# Patient Record
Sex: MALE | Race: BLACK OR AFRICAN AMERICAN | NOT HISPANIC OR LATINO | Employment: OTHER | URBAN - METROPOLITAN AREA
[De-identification: names, ages, dates, MRNs, and addresses within clinical notes are randomized per-mention and may not be internally consistent; named-entity substitution may affect disease eponyms.]

---

## 2017-02-13 ENCOUNTER — TRANSCRIBE ORDERS (OUTPATIENT)
Dept: ADMINISTRATIVE | Facility: HOSPITAL | Age: 72
End: 2017-02-13

## 2017-02-13 DIAGNOSIS — Z87.891 PERSONAL HISTORY OF TOBACCO USE, PRESENTING HAZARDS TO HEALTH: Primary | ICD-10-CM

## 2017-02-17 ENCOUNTER — HOSPITAL ENCOUNTER (OUTPATIENT)
Dept: CT IMAGING | Facility: HOSPITAL | Age: 72
Discharge: HOME | End: 2017-02-17
Admitting: NURSE PRACTITIONER

## 2017-02-17 DIAGNOSIS — Z87.891 PERSONAL HISTORY OF TOBACCO USE, PRESENTING HAZARDS TO HEALTH: ICD-10-CM

## 2017-02-17 PROCEDURE — G0297 LDCT FOR LUNG CA SCREEN: HCPCS

## 2017-04-11 ENCOUNTER — TRANSCRIBE ORDERS (OUTPATIENT)
Dept: ADMINISTRATIVE | Facility: HOSPITAL | Age: 72
End: 2017-04-11

## 2017-04-11 ENCOUNTER — HOSPITAL ENCOUNTER (OUTPATIENT)
Dept: GENERAL RADIOLOGY | Facility: HOSPITAL | Age: 72
Discharge: HOME | End: 2017-04-11
Attending: FAMILY MEDICINE | Admitting: FAMILY MEDICINE

## 2017-04-11 DIAGNOSIS — M54.9 BACK PAIN, UNSPECIFIED BACK LOCATION, UNSPECIFIED BACK PAIN LATERALITY, UNSPECIFIED CHRONICITY: Primary | ICD-10-CM

## 2017-04-11 PROCEDURE — 72114 X-RAY EXAM L-S SPINE BENDING: CPT

## 2017-09-13 ENCOUNTER — TRANSCRIBE ORDERS (OUTPATIENT)
Dept: ADMINISTRATIVE | Facility: HOSPITAL | Age: 72
End: 2017-09-13

## 2017-09-13 ENCOUNTER — HOSPITAL ENCOUNTER (OUTPATIENT)
Dept: GENERAL RADIOLOGY | Facility: HOSPITAL | Age: 72
Discharge: HOME | End: 2017-09-13
Attending: PAIN MEDICINE | Admitting: PAIN MEDICINE

## 2017-09-13 DIAGNOSIS — M25.551 PAIN IN RIGHT HIP: Primary | ICD-10-CM

## 2017-09-13 DIAGNOSIS — M25.552 PAIN IN LEFT HIP: ICD-10-CM

## 2017-09-13 PROCEDURE — 73521 X-RAY EXAM HIPS BI 2 VIEWS: CPT

## 2017-10-10 ENCOUNTER — OFFICE VISIT (OUTPATIENT)
Dept: ORTHOPEDIC SURGERY | Facility: CLINIC | Age: 72
End: 2017-10-10

## 2017-10-10 VITALS — HEIGHT: 69 IN | WEIGHT: 233 LBS | BODY MASS INDEX: 34.51 KG/M2

## 2017-10-10 DIAGNOSIS — Z96.652 H/O TOTAL KNEE REPLACEMENT, LEFT: Primary | ICD-10-CM

## 2017-10-10 PROCEDURE — 99213 OFFICE O/P EST LOW 20 MIN: CPT | Performed by: PHYSICIAN ASSISTANT

## 2017-10-10 RX ORDER — SPIRONOLACTONE 25 MG/1
TABLET ORAL
COMMUNITY
Start: 2017-08-07 | End: 2020-02-27

## 2017-10-10 RX ORDER — HYDROCODONE BITARTRATE AND ACETAMINOPHEN 5; 325 MG/1; MG/1
TABLET ORAL
Refills: 0 | COMMUNITY
Start: 2017-09-12 | End: 2020-02-27

## 2017-10-10 RX ORDER — DOXAZOSIN MESYLATE 4 MG/1
8 TABLET ORAL NIGHTLY
COMMUNITY
Start: 2017-08-21 | End: 2023-03-08 | Stop reason: HOSPADM

## 2017-10-10 RX ORDER — AMLODIPINE BESYLATE 5 MG/1
TABLET ORAL
Refills: 0 | COMMUNITY
Start: 2017-10-04 | End: 2023-02-24

## 2017-10-10 RX ORDER — FINASTERIDE 5 MG/1
5 TABLET, FILM COATED ORAL DAILY
COMMUNITY
Start: 2017-10-09 | End: 2023-03-08 | Stop reason: HOSPADM

## 2017-10-10 RX ORDER — CETIRIZINE HYDROCHLORIDE 10 MG/1
TABLET ORAL
COMMUNITY
Start: 2017-08-07 | End: 2020-02-27

## 2017-10-10 RX ORDER — BACLOFEN 20 MG/1
TABLET ORAL
Refills: 0 | COMMUNITY
Start: 2017-08-22 | End: 2020-02-27

## 2017-10-10 NOTE — PROGRESS NOTES
I have reviewed the notes, assessments, and/or procedures performed by Yaneth Chu PA-C, I concur with her documentation of Miguel Camejo.

## 2017-10-10 NOTE — PROGRESS NOTES
Patient: Miguel Camejo  : 1945    Primary Care Provider: Cuong Hairston MD    Requesting Provider: As above    Follow-up (1 year Left TKA 16)      History    Chief Complaint: Patient returns today for routine annual follow-up left total knee arthroplasty May 2016 with Dr. Harrison.    History of Present Illness: This is a very pleasant 72-year-old male presenting today for routine annual follow-up left total knee arthroplasty.  He reports no pain or problems in the knee itself.  He complains of numbness and tingling and pins and needles in the left lower extremity below the knee.  He does have a history of back problems.  He cannot tell me how long this is been going on.        Allergies   Allergen Reactions   • Benazepril Swelling   • Lisinopril      Current Outpatient Prescriptions on File Prior to Visit   Medication Sig Dispense Refill   • albuterol (PROAIR HFA) 108 (90 Base) MCG/ACT inhaler Inhale Take As Directed.     • aliskiren (TEKTURNA) 300 MG tablet Take  by mouth Take As Directed.     • allopurinol (ZYLOPRIM) 300 MG tablet Take  by mouth Take As Directed.     • Ascorbic Acid (VITAMIN C PO) Take  by mouth Take As Directed.     • aspirin  MG tablet Take  by mouth Take As Directed.     • atorvastatin (LIPITOR) 40 MG tablet Take  by mouth Take As Directed.     • Cyanocobalamin (VITAMIN B-12 PO) Take  by mouth Take As Directed.     • diclofenac (VOLTAREN) 1 % gel gel Place  on the skin Take As Directed.     • folic acid (FOLVITE) 1 MG tablet Take  by mouth Take As Directed.     • hydrochlorothiazide (HYDRODIURIL) 25 MG tablet Take  by mouth Take As Directed.     • losartan (COZAAR) 100 MG tablet Take  by mouth Take As Directed.     • metFORMIN (GLUCOPHAGE) 850 MG tablet Take  by mouth Take As Directed.     • metoprolol succinate XL (TOPROL-XL) 100 MG 24 hr tablet Take  by mouth Take As Directed.     • minoxidil (LONITEN) 10 MG tablet Take  by mouth Take As Directed.     • Omega-3  Fatty Acids (FISH OIL CONCENTRATE PO) Take  by mouth Take As Directed.     • omeprazole (priLOSEC) 40 MG capsule Take  by mouth Take As Directed.     • terbinafine (lamiSIL) 250 MG tablet Take  by mouth Take As Directed.     • tiotropium (SPIRIVA HANDIHALER) 18 MCG per inhalation capsule Place  into inhaler and inhale Take As Directed.     • [DISCONTINUED] dicyclomine (BENTYL) 20 MG tablet Take  by mouth Take As Directed.     • [DISCONTINUED] HYDROcodone-acetaminophen (NORCO)  MG per tablet Take  by mouth Take As Directed.       No current facility-administered medications on file prior to visit.      Social History     Social History   • Marital status:      Spouse name: N/A   • Number of children: N/A   • Years of education: N/A     Occupational History   • Not on file.     Social History Main Topics   • Smoking status: Former Smoker     Packs/day: 2.00     Years: 36.00     Types: Cigarettes   • Smokeless tobacco: Never Used   • Alcohol use Yes      Comment: occasional   • Drug use: No   • Sexual activity: Defer     Other Topics Concern   • Not on file     Social History Narrative     Past Surgical History:   Procedure Laterality Date   • COLONOSCOPY     • KNEE ARTHROSCOPY Left    • PARTIAL KNEE ARTHROPLASTY Left    • VASECTOMY       Family History   Problem Relation Age of Onset   • Hypertension Mother    • Cancer Mother    • Rheum arthritis Mother    • Osteoarthritis Mother    • Hypertension Father    • Heart attack Father    • Heart disease Father    • Hypertension Other    • Heart attack Other    • Heart disease Other    • Cancer Other    • Stroke Other      Past Medical History:   Diagnosis Date   • Chondrocalcinosis of right knee    • COPD (chronic obstructive pulmonary disease)    • Diabetes mellitus    • Esophagitis    • Gout    • Hypertension    • IBS (irritable bowel syndrome)    • JONAH on CPAP    • Primary osteoarthritis of both knees    • Rheumatoid arthritis    • Skin problem    • SOB  "(shortness of breath)          Review of Systems   Constitutional: Positive for chills and fatigue.   HENT: Negative.    Eyes: Positive for discharge.   Respiratory: Positive for apnea and shortness of breath.    Cardiovascular: Positive for leg swelling.   Gastrointestinal: Positive for blood in stool, constipation and diarrhea.   Endocrine: Positive for cold intolerance.   Genitourinary: Negative.    Musculoskeletal: Positive for back pain.   Skin: Positive for color change and rash.   Allergic/Immunologic: Negative.    Neurological: Positive for weakness and numbness.   Hematological: Negative.    Psychiatric/Behavioral: Negative.        The following portions of the patient's history were reviewed and updated as appropriate: allergies, current medications, past family history, past medical history, past social history, past surgical history and problem list.    Objective   Ht 69\" (175.3 cm)  Wt 233 lb (106 kg)  BMI 34.41 kg/m2    Physical Exam:   GENERAL: Body habitus: obese    Lower extremity edema: Left: 1+ pitting; Right: 1+ pitting    Varicose veins:  Left: none; Right: none    Gait: normal     Mental Status:  awake and alert; oriented to person, place, and time    Voice:  clear  SKIN:  Normal    Hair Growth:  Right:normal; Left:  normal  HEENT: Head: Normocephalic, no lesions, without obvious abnormality.     Eyes: sclera anicteric  PULM:  Repiratory effort normal    Ortho Exam  Left Hip Exam  ---------  FLEXION CONTRACTURE: None  FLEXION: 110 degrees  INTERNAL ROTATION: 20 degrees at 90 degrees of flexion   EXTERNAL ROTATION: 40 degrees at 90 degrees of flexion    PAIN WITH HIP MOTION: no      Left Knee Exam  ----------  Knee Exam:  ----------  ALIGNMENT:  Left: neutral  ----------  RANGE OF MOTION:  Left: 0-120  LIGAMENTOUS STABILITY:   Left:stable to varus and valgus stress at terminal extension and 30 degrees without any evidence of laxity   ----------  STRENGTH:  KNEE FLEXION  Left 5/5  KNEE " EXTENSION Left 5/5  ----------  PAIN WITH PALPATION: Left denies tenderness to palpation about the knee  PAIN WITH KNEE ROM:  Left no  PATELLAR CREPITUS:  Left no  ----------  SENSATION TO LIGHT TOUCH:  DEEP PERONEAL/SUPERFICIAL PERONEAL/SURAL/SAPHENOUS/TIBIAL:   Left intact  ----------  EDEMA:   Left:  no  ERYTHEMA:  ; Left:  no  WOUNDS/INCISIONS: none, no overlying skin problems.      Medical Decision Making    Data Review:   ordered and reviewed x-rays today    Assessment and Plan/ Diagnosis/Treatment options:   Doing well status post left total knee arthroplasty May 2016.  I reviewed x-rays and clinical findings with the patient.  On exam, he has good range of motion with no tenderness and stable ligamentous exam.  X-rays today show well-positioned, cemented total knee arthroplasty with no evidence of osteolysis, subsidence or fracture.  Patient reports no pain in the knee he is able to walk and do activities he would like to without any problem.  Plan is continued observation.  He'll return in 2 years with repeat x-rays of the left knee or sooner if needed.

## 2017-11-17 ENCOUNTER — TRANSCRIBE ORDERS (OUTPATIENT)
Dept: ADMINISTRATIVE | Facility: HOSPITAL | Age: 72
End: 2017-11-17

## 2017-11-17 DIAGNOSIS — M47.816 LUMBAR SPONDYLOSIS: Primary | ICD-10-CM

## 2017-11-21 ENCOUNTER — HOSPITAL ENCOUNTER (OUTPATIENT)
Dept: MRI IMAGING | Facility: HOSPITAL | Age: 72
Discharge: HOME | End: 2017-11-21
Attending: PAIN MEDICINE | Admitting: PAIN MEDICINE

## 2017-11-21 DIAGNOSIS — M47.816 LUMBAR SPONDYLOSIS: ICD-10-CM

## 2017-11-21 PROCEDURE — 72148 MRI LUMBAR SPINE W/O DYE: CPT

## 2017-11-22 ENCOUNTER — TRANSCRIBE ORDERS (OUTPATIENT)
Dept: ADMINISTRATIVE | Facility: HOSPITAL | Age: 72
End: 2017-11-22

## 2018-05-23 ENCOUNTER — TRANSCRIBE ORDERS (OUTPATIENT)
Dept: ADMINISTRATIVE | Facility: HOSPITAL | Age: 73
End: 2018-05-23

## 2018-05-23 DIAGNOSIS — N64.4 BREAST PAIN: Primary | ICD-10-CM

## 2018-06-01 ENCOUNTER — HOSPITAL ENCOUNTER (OUTPATIENT)
Dept: ULTRASOUND IMAGING | Facility: HOSPITAL | Age: 73
Discharge: HOME | End: 2018-06-01

## 2018-06-01 ENCOUNTER — HOSPITAL ENCOUNTER (OUTPATIENT)
Dept: MAMMOGRAPHY | Facility: HOSPITAL | Age: 73
Discharge: HOME | End: 2018-06-01
Attending: FAMILY MEDICINE | Admitting: FAMILY MEDICINE

## 2018-06-01 DIAGNOSIS — N64.4 BREAST PAIN: ICD-10-CM

## 2018-06-01 PROCEDURE — G0279 TOMOSYNTHESIS, MAMMO: HCPCS

## 2018-06-01 PROCEDURE — 77066 DX MAMMO INCL CAD BI: CPT | Performed by: RADIOLOGY

## 2018-06-01 PROCEDURE — 76642 ULTRASOUND BREAST LIMITED: CPT

## 2018-06-01 PROCEDURE — G0279 TOMOSYNTHESIS, MAMMO: HCPCS | Performed by: RADIOLOGY

## 2018-06-01 PROCEDURE — 76642 ULTRASOUND BREAST LIMITED: CPT | Performed by: RADIOLOGY

## 2018-06-01 PROCEDURE — 77066 DX MAMMO INCL CAD BI: CPT

## 2018-06-29 ENCOUNTER — TELEPHONE (OUTPATIENT)
Dept: GENETICS | Facility: HOSPITAL | Age: 73
End: 2018-06-29

## 2019-02-01 ENCOUNTER — HOSPITAL ENCOUNTER (OUTPATIENT)
Dept: GENERAL RADIOLOGY | Facility: HOSPITAL | Age: 74
Discharge: HOME | End: 2019-02-01
Attending: FAMILY MEDICINE | Admitting: FAMILY MEDICINE

## 2019-02-01 ENCOUNTER — TRANSCRIBE ORDERS (OUTPATIENT)
Dept: ADMINISTRATIVE | Facility: HOSPITAL | Age: 74
End: 2019-02-01

## 2019-02-01 DIAGNOSIS — M25.561 RIGHT KNEE PAIN, UNSPECIFIED CHRONICITY: Primary | ICD-10-CM

## 2019-02-01 PROCEDURE — 73564 X-RAY EXAM KNEE 4 OR MORE: CPT

## 2019-03-15 ENCOUNTER — TRANSCRIBE ORDERS (OUTPATIENT)
Dept: ADMINISTRATIVE | Facility: HOSPITAL | Age: 74
End: 2019-03-15

## 2019-03-15 DIAGNOSIS — Z87.891 PERSONAL HISTORY OF TOBACCO USE, PRESENTING HAZARDS TO HEALTH: Primary | ICD-10-CM

## 2019-04-04 ENCOUNTER — HOSPITAL ENCOUNTER (OUTPATIENT)
Dept: ULTRASOUND IMAGING | Facility: HOSPITAL | Age: 74
Discharge: HOME | End: 2019-04-04
Admitting: NURSE PRACTITIONER

## 2019-04-04 ENCOUNTER — APPOINTMENT (OUTPATIENT)
Dept: CT IMAGING | Facility: HOSPITAL | Age: 74
End: 2019-04-04

## 2019-04-04 DIAGNOSIS — Z87.891 PERSONAL HISTORY OF TOBACCO USE, PRESENTING HAZARDS TO HEALTH: ICD-10-CM

## 2019-04-04 PROCEDURE — 76706 US ABDL AORTA SCREEN AAA: CPT

## 2019-10-29 ENCOUNTER — OFFICE VISIT (OUTPATIENT)
Dept: ORTHOPEDIC SURGERY | Facility: CLINIC | Age: 74
End: 2019-10-29

## 2019-10-29 VITALS — OXYGEN SATURATION: 97 % | HEIGHT: 70 IN | HEART RATE: 78 BPM | BODY MASS INDEX: 34.59 KG/M2 | WEIGHT: 241.62 LBS

## 2019-10-29 DIAGNOSIS — Z96.652 HISTORY OF TOTAL LEFT KNEE REPLACEMENT: Primary | ICD-10-CM

## 2019-10-29 PROCEDURE — 99212 OFFICE O/P EST SF 10 MIN: CPT | Performed by: PHYSICIAN ASSISTANT

## 2019-10-29 RX ORDER — ASPIRIN 81 MG/1
81 TABLET ORAL DAILY
COMMUNITY

## 2019-10-29 RX ORDER — ASCORBIC ACID 500 MG
500 TABLET ORAL DAILY
COMMUNITY
End: 2020-02-27

## 2019-10-29 NOTE — PROGRESS NOTES
[facility] [place] [facility] Note    Subjective     Patient: Miguel Camejo  : 1945    Primary Care Provider: Cuong Hairston MD    Requesting Provider: As above    Follow-up of the Left Knee (2 year f/u/H/O total knee replacement, left 16)      History    Chief Complaint: Follow-up left total knee    History of Present Illness: Patient returns today for routine annual follow-up left total knee arthroplasty with Dr. Keen in 2016.  He reports some tightness in the knee but no pain.  He is able to walk and do his normal activity without any difficulty.  He is been very happy with his outcome.    Current Outpatient Medications on File Prior to Visit   Medication Sig Dispense Refill   • albuterol (PROAIR HFA) 108 (90 Base) MCG/ACT inhaler Inhale Take As Directed.     • allopurinol (ZYLOPRIM) 300 MG tablet Take  by mouth Take As Directed.     • amLODIPine (NORVASC) 5 MG tablet   0   • aspirin 81 MG EC tablet Take 81 mg by mouth Daily.     • atorvastatin (LIPITOR) 40 MG tablet Take  by mouth Take As Directed.     • cetirizine (zyrTEC) 10 MG tablet      • doxazosin (CARDURA) 4 MG tablet      • finasteride (PROSCAR) 5 MG tablet      • folic acid (FOLVITE) 1 MG tablet Take  by mouth Take As Directed.     • hydrochlorothiazide (HYDRODIURIL) 25 MG tablet Take  by mouth Take As Directed.     • HYDROcodone-acetaminophen (NORCO) 5-325 MG per tablet take 1/2 to 1 tablet by mouth twice a day if needed  0   • losartan (COZAAR) 100 MG tablet Take  by mouth Take As Directed.     • metoprolol succinate XL (TOPROL-XL) 100 MG 24 hr tablet Take  by mouth Take As Directed.     • minoxidil (LONITEN) 10 MG tablet Take  by mouth Take As Directed.     • Multiple Vitamin (MULTI VITAMIN DAILY PO) Take  by mouth.     • omeprazole (priLOSEC) 40 MG capsule Take  by mouth Take As Directed.     • tiotropium (SPIRIVA HANDIHALER) 18 MCG per inhalation capsule Place  into inhaler and inhale Take As Directed.     •  vitamin C (ASCORBIC ACID) 500 MG tablet Take 500 mg by mouth Daily.     • aliskiren (TEKTURNA) 300 MG tablet Take  by mouth Take As Directed.     • Ascorbic Acid (VITAMIN C PO) Take  by mouth Take As Directed.     • aspirin  MG tablet Take  by mouth Take As Directed.     • baclofen (LIORESAL) 20 MG tablet take 1 tablet by mouth at bedtime  0   • BREO ELLIPTA 100-25 MCG/INH inhaler   0   • Cyanocobalamin (VITAMIN B-12 PO) Take  by mouth Take As Directed.     • diclofenac (VOLTAREN) 1 % gel gel Place  on the skin Take As Directed.     • metFORMIN (GLUCOPHAGE) 850 MG tablet Take  by mouth Take As Directed.     • Omega-3 Fatty Acids (FISH OIL CONCENTRATE PO) Take  by mouth Take As Directed.     • spironolactone (ALDACTONE) 25 MG tablet      • terbinafine (lamiSIL) 250 MG tablet Take  by mouth Take As Directed.       No current facility-administered medications on file prior to visit.       Allergies   Allergen Reactions   • Benazepril Swelling   • Lisinopril       Past Medical History:   Diagnosis Date   • Chondrocalcinosis of right knee    • COPD (chronic obstructive pulmonary disease) (CMS/HCC)    • Diabetes mellitus (CMS/HCC)    • Esophagitis    • Gout    • Hypertension    • IBS (irritable bowel syndrome)    • JONAH on CPAP    • Primary osteoarthritis of both knees    • Rheumatoid arthritis (CMS/HCC)    • Skin problem    • SOB (shortness of breath)      Past Surgical History:   Procedure Laterality Date   • COLONOSCOPY     • KNEE ARTHROSCOPY Left    • PARTIAL KNEE ARTHROPLASTY Left    • VASECTOMY       Family History   Problem Relation Age of Onset   • Hypertension Mother    • Cancer Mother    • Rheum arthritis Mother    • Osteoarthritis Mother    • Hypertension Father    • Heart attack Father    • Heart disease Father    • Hypertension Other    • Heart attack Other    • Heart disease Other    • Cancer Other    • Stroke Other    • BRCA 1/2 Neg Hx    • Breast cancer Neg Hx    • Ovarian cancer Neg Hx       Social  "History     Socioeconomic History   • Marital status:      Spouse name: Not on file   • Number of children: Not on file   • Years of education: Not on file   • Highest education level: Not on file   Tobacco Use   • Smoking status: Former Smoker     Packs/day: 2.00     Years: 36.00     Pack years: 72.00     Types: Cigarettes   • Smokeless tobacco: Never Used   Substance and Sexual Activity   • Alcohol use: Yes     Comment: occasional   • Drug use: No   • Sexual activity: Defer        Review of Systems   Constitutional: Negative.    HENT: Negative.    Eyes: Negative.    Respiratory: Negative.    Cardiovascular: Negative.    Gastrointestinal: Negative.    Endocrine: Negative.    Genitourinary: Negative.    Musculoskeletal:        No pain (L) TKA TIGHTNESS   Skin: Negative.    Allergic/Immunologic: Negative.    Neurological: Negative.    Hematological: Negative.    Psychiatric/Behavioral: Negative.        The following portions of the patient's history were reviewed and updated as appropriate: allergies, current medications, past family history, past medical history, past social history, past surgical history and problem list.      Objective      Physical Exam  Pulse 78   Ht 177.8 cm (70\")   Wt 110 kg (241 lb 10 oz)   SpO2 97%   BMI 34.67 kg/m²     Body mass index is 34.67 kg/m².    Patient is well developed, well nourished and in no acute distress.  Alert and oriented x 3.    Ortho Exam    Left Knee Exam  ----------  Knee Exam:  ----------  ALIGNMENT:  Left: neutral  ----------  RANGE OF MOTION:  Left: Normal (0-120 degrees) with no extensor lag or flexion contracture  LIGAMENTOUS STABILITY:   Left:stable to varus and valgus stress at terminal extension and 30 degrees without any evidence of laxity   ----------  STRENGTH:  KNEE FLEXION  Left 5/5  KNEE EXTENSION Left 5/5  ----------  PAIN WITH PALPATION: Left denies tenderness to palpation about the knee  PAIN WITH KNEE ROM:  Left no  PATELLAR CREPITUS:  Left " no  ----------  SENSATION TO LIGHT TOUCH:  DEEP PERONEAL/SUPERFICIAL PERONEAL/SURAL/SAPHENOUS/TIBIAL:   Left intact  ----------  EFFUSION:   Left:  no  ERYTHEMA:  ; Left:  no  WOUNDS/INCISIONS: Well-healed anterior incision, no overlying skin problems.      Medical Decision Making    Data Review:   ordered and reviewed x-rays today    Assessment:  1. History of total left knee replacement        Plan:  Status post left total knee arthroplasty with Dr. Keen in 2016.  X-rays today show well position, cemented total knee arthroplasty with no evidence of osteolysis, subsidence of fracture.  Patient has been very happy with his outcome.  Plan is he return in 2 years with repeat x-rays or sooner if needed.      Yaneth Chu PA-C  10/29/19  1:12 PM

## 2020-02-26 PROBLEM — D61.818 PANCYTOPENIA (HCC): Status: ACTIVE | Noted: 2020-02-26

## 2020-02-27 ENCOUNTER — LAB (OUTPATIENT)
Dept: LAB | Facility: HOSPITAL | Age: 75
End: 2020-02-27

## 2020-02-27 ENCOUNTER — CONSULT (OUTPATIENT)
Dept: ONCOLOGY | Facility: CLINIC | Age: 75
End: 2020-02-27

## 2020-02-27 VITALS
SYSTOLIC BLOOD PRESSURE: 157 MMHG | RESPIRATION RATE: 18 BRPM | HEART RATE: 74 BPM | TEMPERATURE: 99.2 F | BODY MASS INDEX: 34.93 KG/M2 | DIASTOLIC BLOOD PRESSURE: 69 MMHG | WEIGHT: 244 LBS | HEIGHT: 70 IN | OXYGEN SATURATION: 94 %

## 2020-02-27 DIAGNOSIS — D61.818 PANCYTOPENIA (HCC): ICD-10-CM

## 2020-02-27 DIAGNOSIS — D61.818 PANCYTOPENIA (HCC): Primary | ICD-10-CM

## 2020-02-27 DIAGNOSIS — Z13.6 ENCOUNTER FOR SCREENING FOR CARDIOVASCULAR DISORDERS: ICD-10-CM

## 2020-02-27 LAB
ALBUMIN SERPL-MCNC: 4.2 G/DL (ref 3.5–5.2)
ALBUMIN/GLOB SERPL: 1.2 G/DL
ALP SERPL-CCNC: 65 U/L (ref 39–117)
ALT SERPL W P-5'-P-CCNC: 14 U/L (ref 1–41)
ANION GAP SERPL CALCULATED.3IONS-SCNC: 10 MMOL/L (ref 5–15)
AST SERPL-CCNC: 16 U/L (ref 1–40)
BILIRUB CONJ SERPL-MCNC: <0.2 MG/DL (ref 0.2–0.3)
BILIRUB INDIRECT SERPL-MCNC: ABNORMAL MG/DL
BILIRUB SERPL-MCNC: 0.5 MG/DL (ref 0.2–1.2)
BILIRUB SERPL-MCNC: 0.5 MG/DL (ref 0.2–1.2)
BUN BLD-MCNC: 25 MG/DL (ref 8–23)
BUN/CREAT SERPL: 16.7 (ref 7–25)
CALCIUM SPEC-SCNC: 9.1 MG/DL (ref 8.6–10.5)
CHLORIDE SERPL-SCNC: 104 MMOL/L (ref 98–107)
CHROMATIN AB SERPL-ACNC: <10 IU/ML (ref 0–14)
CO2 SERPL-SCNC: 29 MMOL/L (ref 22–29)
CREAT BLD-MCNC: 1.5 MG/DL (ref 0.76–1.27)
DAT POLY-SP REAG RBC QL: NEGATIVE
ERYTHROCYTE [DISTWIDTH] IN BLOOD BY AUTOMATED COUNT: 13.1 % (ref 12.3–15.4)
FOLATE SERPL-MCNC: >20 NG/ML (ref 4.78–24.2)
GFR SERPL CREATININE-BSD FRML MDRD: 55 ML/MIN/1.73
GLOBULIN UR ELPH-MCNC: 3.5 GM/DL
GLUCOSE BLD-MCNC: 105 MG/DL (ref 65–99)
HAPTOGLOB SERPL-MCNC: 213 MG/DL (ref 30–200)
HCT VFR BLD AUTO: 34 % (ref 37.5–51)
HCYS SERPL-MCNC: 10.5 UMOL/L (ref 0–15)
HGB BLD-MCNC: 11.4 G/DL (ref 13–17.7)
LDH SERPL-CCNC: 242 U/L (ref 135–225)
LYMPHOCYTES # BLD AUTO: 1.2 10*3/MM3 (ref 0.7–3.1)
LYMPHOCYTES NFR BLD AUTO: 33.6 % (ref 19.6–45.3)
MCH RBC QN AUTO: 30.2 PG (ref 26.6–33)
MCHC RBC AUTO-ENTMCNC: 33.4 G/DL (ref 31.5–35.7)
MCV RBC AUTO: 90.2 FL (ref 79–97)
MONOCYTES # BLD AUTO: 0.4 10*3/MM3 (ref 0.1–0.9)
MONOCYTES NFR BLD AUTO: 10 % (ref 5–12)
NEUTROPHILS # BLD AUTO: 2 10*3/MM3 (ref 1.7–7)
NEUTROPHILS NFR BLD AUTO: 56.4 % (ref 42.7–76)
PLATELET # BLD AUTO: 140 10*3/MM3 (ref 140–450)
PMV BLD AUTO: 7.3 FL (ref 6–12)
POTASSIUM BLD-SCNC: 4.1 MMOL/L (ref 3.5–5.2)
PROT SERPL-MCNC: 7.7 G/DL (ref 6–8.5)
RBC # BLD AUTO: 3.77 10*6/MM3 (ref 4.14–5.8)
RETICS # AUTO: 0.08 10*6/MM3 (ref 0.02–0.13)
RETICS/RBC NFR AUTO: 2.08 % (ref 0.7–1.9)
SODIUM BLD-SCNC: 143 MMOL/L (ref 136–145)
VIT B12 BLD-MCNC: >2000 PG/ML (ref 211–946)
WBC NRBC COR # BLD: 3.6 10*3/MM3 (ref 3.4–10.8)

## 2020-02-27 PROCEDURE — 82784 ASSAY IGA/IGD/IGG/IGM EACH: CPT

## 2020-02-27 PROCEDURE — 36415 COLL VENOUS BLD VENIPUNCTURE: CPT

## 2020-02-27 PROCEDURE — 85045 AUTOMATED RETICULOCYTE COUNT: CPT

## 2020-02-27 PROCEDURE — 83010 ASSAY OF HAPTOGLOBIN QUANT: CPT

## 2020-02-27 PROCEDURE — 85060 BLOOD SMEAR INTERPRETATION: CPT

## 2020-02-27 PROCEDURE — 83921 ORGANIC ACID SINGLE QUANT: CPT

## 2020-02-27 PROCEDURE — 82746 ASSAY OF FOLIC ACID SERUM: CPT

## 2020-02-27 PROCEDURE — 83883 ASSAY NEPHELOMETRY NOT SPEC: CPT

## 2020-02-27 PROCEDURE — 82248 BILIRUBIN DIRECT: CPT

## 2020-02-27 PROCEDURE — 86880 COOMBS TEST DIRECT: CPT

## 2020-02-27 PROCEDURE — 84165 PROTEIN E-PHORESIS SERUM: CPT

## 2020-02-27 PROCEDURE — 86334 IMMUNOFIX E-PHORESIS SERUM: CPT

## 2020-02-27 PROCEDURE — 82955 ASSAY OF G6PD ENZYME: CPT

## 2020-02-27 PROCEDURE — 83090 ASSAY OF HOMOCYSTEINE: CPT

## 2020-02-27 PROCEDURE — 83051 HEMOGLOBIN PLASMA: CPT

## 2020-02-27 PROCEDURE — 83615 LACTATE (LD) (LDH) ENZYME: CPT

## 2020-02-27 PROCEDURE — 82668 ASSAY OF ERYTHROPOIETIN: CPT

## 2020-02-27 PROCEDURE — 82247 BILIRUBIN TOTAL: CPT

## 2020-02-27 PROCEDURE — 85025 COMPLETE CBC W/AUTO DIFF WBC: CPT

## 2020-02-27 PROCEDURE — 86038 ANTINUCLEAR ANTIBODIES: CPT

## 2020-02-27 PROCEDURE — 80053 COMPREHEN METABOLIC PANEL: CPT

## 2020-02-27 PROCEDURE — 86431 RHEUMATOID FACTOR QUANT: CPT

## 2020-02-27 PROCEDURE — 99205 OFFICE O/P NEW HI 60 MIN: CPT | Performed by: INTERNAL MEDICINE

## 2020-02-27 PROCEDURE — 82607 VITAMIN B-12: CPT

## 2020-02-27 PROCEDURE — 85041 AUTOMATED RBC COUNT: CPT

## 2020-02-27 PROCEDURE — 83070 ASSAY OF HEMOSIDERIN QUAL: CPT

## 2020-02-27 RX ORDER — MULTIVIT WITH MINERALS/LUTEIN
TABLET ORAL
Status: ON HOLD | COMMUNITY
Start: 2020-02-05 | End: 2023-03-06

## 2020-02-27 RX ORDER — BLOOD SUGAR DIAGNOSTIC
STRIP MISCELLANEOUS
Status: ON HOLD | COMMUNITY
Start: 2020-02-05 | End: 2023-03-06

## 2020-02-27 RX ORDER — LANCETS
EACH MISCELLANEOUS
Status: ON HOLD | COMMUNITY
Start: 2020-02-05 | End: 2023-03-06

## 2020-02-27 RX ORDER — LANOLIN ALCOHOL/MO/W.PET/CERES
CREAM (GRAM) TOPICAL
COMMUNITY
Start: 2020-02-04 | End: 2023-02-24

## 2020-02-27 RX ORDER — CIPROFLOXACIN 500 MG/1
500 TABLET, FILM COATED ORAL 2 TIMES DAILY
COMMUNITY
Start: 2020-02-22 | End: 2020-02-27

## 2020-02-27 RX ORDER — CIPROFLOXACIN 500 MG/1
500 TABLET, FILM COATED ORAL 2 TIMES DAILY
COMMUNITY
End: 2020-08-17

## 2020-02-27 RX ORDER — SENNOSIDES 8.6 MG
650 CAPSULE ORAL EVERY 8 HOURS PRN
COMMUNITY
End: 2023-02-24

## 2020-02-27 NOTE — PROGRESS NOTES
CHIEF COMPLAINT: Pancytopenia    REASON FOR REFERRAL: Same      RECORDS OBTAINED  Records of the patients history including those obtained from Dr. Hairston were reviewed and summarized in detail.    Oncology/Hematology History    1.  Pancytopenia  2.  Chronic low back pain   3.  Chronic kidney disease  4.  Type 2 diabetes  5.  Hyperlipidemia  6.  Hypertension  7.  Hypogonadism  8.  Depression  9. COPD  10.  Obstructive sleep apnea  11.  Reflux   12.  Gout  13 osteoarthritis  14.  BPH with ED  15.  BMI 36    Hematology history:  -Longstanding history of heme positive stools dating back to the late 80s with at least 5 colonoscopies by Dr. Perez including the last 1/2018 as well as EGDs and a capsule endoscopy that apparently showed scattered blood that they could not do much about.In the [facility] system we have hemoglobins of 9.8 and platelet of 121,000 with white count 4880 as of June 2016 and December 2015 white count 3020 with platelets 126,000 hemoglobin 11.4.  Does have a history of heavy alcohol use but none in the past several years.  -8/7/2019 Hemoccult negative  -1/30/2020 reticulocyte count 1.4%.  B12 618.  Iron slightly low 48.  White count 2700 with hemoglobin 11.6 MCV 88 and normal red cell distribution width with platelets 124,000.  Normal thyroid functions.  -2/4/2020 white count 3100 hemoglobin 11.7 with normal MCV and red cell distribution with platelets 126,000 normal liver enzymes and bilirubin including fractionation.  Creatinine 1.36 GFR greater than 60 with normal calcium 9.1.  .  C-reactive protein 9.67 with sedimentation rate elevated at 40 as well.  Was started on iron with vitamin C.    -2/27/2020 initial [facility] hematology consultation: He had dark tarry stools predating the institution of his current iron and extensive prior endoscopic work-up as outlined above.  I will check his methylmalonic acid and homocystine along with repeat B12 and folate and with his elevated C-reactive protein and  sedimentation rate will check a serum immunoelectrophoresis and light chains.  Given his prior drinking history despite the fact that his liver enzymes have been normal I strongly suspect portal hypertension and I suspect the GI bleeding may be related to this but we will get records from .  If we do not get answers from this then we will proceed with bone marrow biopsy.  If there is a monoclonal gammopathy we will also proceed with bone marrow biopsy.        Pancytopenia (CMS/HCC)    2/26/2020 Initial Diagnosis     Pancytopenia (CMS/HCC)         HISTORY OF PRESENT ILLNESS:  The patient is a 75 y.o.  male, referred for pancytopenia.  See above for hematology history.    REVIEW OF SYSTEMS:  A 14 point review of systems was performed and is negative except as noted above.    Past Medical History:   Diagnosis Date   • Arthritis    • Asthma    • Bowel trouble    • Chondrocalcinosis of right knee    • Colitis    • COPD (chronic obstructive pulmonary disease) (CMS/HCC)    • Diabetes mellitus (CMS/HCC)    • Esophagitis    • Gout    • H/O bladder problems    • Heart murmur    • Hypertension    • IBS (irritable bowel syndrome)    • JONAH on CPAP    • Primary osteoarthritis of both knees    • Rheumatoid arthritis (CMS/HCC)    • Skin problem    • SOB (shortness of breath)      Past Surgical History:   Procedure Laterality Date   • COLONOSCOPY     • KNEE ARTHROSCOPY Left    • PARTIAL KNEE ARTHROPLASTY Left    • SKIN BIOPSY     • STOMACH SURGERY     • VASECTOMY         Current Outpatient Medications on File Prior to Visit   Medication Sig Dispense Refill   • acetaminophen (TYLENOL) 650 MG 8 hr tablet Take 650 mg by mouth Every 8 (Eight) Hours As Needed for Mild Pain .     • albuterol (PROAIR HFA) 108 (90 Base) MCG/ACT inhaler Inhale Take As Directed.     • allopurinol (ZYLOPRIM) 300 MG tablet Take  by mouth Take As Directed.     • amLODIPine (NORVASC) 5 MG tablet   0   • aspirin 81 MG EC tablet Take 81 mg by mouth Daily.      • atorvastatin (LIPITOR) 40 MG tablet Take  by mouth Take As Directed.     • ciprofloxacin (CIPRO) 500 MG tablet Take 500 mg by mouth 2 (Two) Times a Day.     • doxazosin (CARDURA) 4 MG tablet      • ferrous sulfate 325 (65 FE) MG EC tablet      • finasteride (PROSCAR) 5 MG tablet      • folic acid (FOLVITE) 1 MG tablet Take 400 mg by mouth Take As Directed.     • hydrochlorothiazide (HYDRODIURIL) 25 MG tablet Take  by mouth Take As Directed.     • losartan (COZAAR) 100 MG tablet Take  by mouth Take As Directed.     • metoprolol succinate XL (TOPROL-XL) 100 MG 24 hr tablet Take  by mouth Take As Directed.     • minoxidil (LONITEN) 10 MG tablet Take  by mouth Take As Directed.     • omeprazole (priLOSEC) 40 MG capsule Take  by mouth Take As Directed.     • tiotropium (SPIRIVA HANDIHALER) 18 MCG per inhalation capsule Place  into inhaler and inhale Take As Directed.     • vitamin C (ASCORBIC ACID) 250 MG tablet      • ACCU-CHEK SARAHI PLUS test strip      • ACCU-CHEK SOFTCLIX LANCETS lancets      • [DISCONTINUED] aliskiren (TEKTURNA) 300 MG tablet Take  by mouth Take As Directed.     • [DISCONTINUED] Ascorbic Acid (VITAMIN C PO) Take  by mouth Take As Directed.     • [DISCONTINUED] aspirin  MG tablet Take  by mouth Take As Directed.     • [DISCONTINUED] baclofen (LIORESAL) 20 MG tablet take 1 tablet by mouth at bedtime  0   • [DISCONTINUED] BREO ELLIPTA 100-25 MCG/INH inhaler   0   • [DISCONTINUED] cetirizine (zyrTEC) 10 MG tablet      • [DISCONTINUED] ciprofloxacin (CIPRO) 500 MG tablet Take 500 mg by mouth 2 (Two) Times a Day.     • [DISCONTINUED] Cyanocobalamin (VITAMIN B-12 PO) Take  by mouth Take As Directed.     • [DISCONTINUED] diclofenac (VOLTAREN) 1 % gel gel Place  on the skin Take As Directed.     • [DISCONTINUED] HYDROcodone-acetaminophen (NORCO) 5-325 MG per tablet take 1/2 to 1 tablet by mouth twice a day if needed  0   • [DISCONTINUED] metFORMIN (GLUCOPHAGE) 850 MG tablet Take  by mouth Take As  "Directed.     • [DISCONTINUED] Multiple Vitamin (MULTI VITAMIN DAILY PO) Take  by mouth.     • [DISCONTINUED] Omega-3 Fatty Acids (FISH OIL CONCENTRATE PO) Take  by mouth Take As Directed.     • [DISCONTINUED] spironolactone (ALDACTONE) 25 MG tablet      • [DISCONTINUED] terbinafine (lamiSIL) 250 MG tablet Take  by mouth Take As Directed.     • [DISCONTINUED] vitamin C (ASCORBIC ACID) 500 MG tablet Take 500 mg by mouth Daily.       No current facility-administered medications on file prior to visit.        Allergies   Allergen Reactions   • Lisinopril Swelling   • Benazepril Swelling       Social History     Socioeconomic History   • Marital status:      Spouse name: Not on file   • Number of children: Not on file   • Years of education: Not on file   • Highest education level: Not on file   Tobacco Use   • Smoking status: Former Smoker     Packs/day: 2.00     Years: 36.00     Pack years: 72.00     Types: Cigarettes   • Smokeless tobacco: Never Used   Substance and Sexual Activity   • Alcohol use: Yes     Comment: occasional   • Drug use: No   • Sexual activity: Defer       Family History   Problem Relation Age of Onset   • Hypertension Mother    • Cancer Mother    • Rheum arthritis Mother    • Osteoarthritis Mother    • Hypertension Father    • Heart attack Father    • Heart disease Father    • Hypertension Other    • Heart attack Other    • Heart disease Other    • Cancer Other    • Stroke Other    • BRCA 1/2 Neg Hx    • Breast cancer Neg Hx    • Ovarian cancer Neg Hx        PHYSICAL EXAM:    /69   Pulse 74   Temp 99.2 °F (37.3 °C) (Temporal)   Resp 18   Ht 177.8 cm (70\")   Wt 111 kg (244 lb)   SpO2 94%   BMI 35.01 kg/m²     ECOG: (0) Fully Active - Able to Carry On All Pre-disease Performance Without Restriction  General: well appearing male in no acute distress  HEENT: sclera anicteric, oropharynx clear  Lymphatics: no cervical, supraclavicular, inguinal, or axillary " adenopathy  Cardiovascular: regular rate and rhythm, no murmurs  Neck: Supple; No thyromegaly  Lungs: clear to auscultation bilaterally. No respiratory distress.   Abdomen: soft, nontender, nondistended.  No palpable organomegaly  Extremities: no cyanosis, clubbing, edema, or cords  Skin: no rashes, lesions, bruising, or petechiae  Neuro: Alert and oriented x 4; Moving all extremities.  Psych: No anxiety or depression    Lab Results   Component Value Date    HGB 9.8 (L) 06/02/2016    HCT 27.9 (L) 06/02/2016    MCV 83.0 06/02/2016     (L) 06/02/2016    WBC 4.88 06/02/2016    NEUTROABS 2.04 05/19/2016    LYMPHSABS 1.20 05/19/2016    MONOSABS 0.46 05/19/2016    EOSABS 0.13 05/19/2016    BASOSABS 0.05 05/19/2016     Lab Results   Component Value Date    GLUCOSE 79 06/02/2016    BUN 22 06/02/2016    CREATININE 1.0 06/02/2016     06/02/2016    K 4.0 06/02/2016     06/02/2016    CO2 32 (H) 06/02/2016    CALCIUM 9.0 06/02/2016    ALBUMIN 4.1 12/08/2014    ALBUMIN 4.4 12/08/2014           Assessment/Plan     1. Pancytopenia  2.  Chronic low back pain   3.  Chronic kidney disease  4.  Type 2 diabetes  5.  Hyperlipidemia  6.  Hypertension  7.  Hypogonadism  8.  Depression  9. COPD  10.  Obstructive sleep apnea  11.  Reflux   12.  Gout  13 osteoarthritis  14.  BPH with ED  15.  BMI 36  16.  Putative history of rheumatoid arthritis    Hematology history:  -Longstanding history of heme positive stools dating back to the late 80s with at least 5 colonoscopies by Dr. Perez including the last 1/2018 as well as EGDs and a capsule endoscopy that apparently showed scattered blood that they could not do much about.In the [facility] system we have hemoglobins of 9.8 and platelet of 121,000 with white count 4880 as of June 2016 and December 2015 white count 3020 with platelets 126,000 hemoglobin 11.4.  Does have a history of heavy alcohol use but none in the past several years.  -8/7/2019 Hemoccult negative  -1/30/2020  reticulocyte count 1.4%.  B12 618.  Iron slightly low 48.  White count 2700 with hemoglobin 11.6 MCV 88 and normal red cell distribution width with platelets 124,000.  Normal thyroid functions.  -2/4/2020 white count 3100 hemoglobin 11.7 with normal MCV and red cell distribution with platelets 126,000 normal liver enzymes and bilirubin including fractionation.  Creatinine 1.36 GFR greater than 60 with normal calcium 9.1.  .  C-reactive protein 9.67 with sedimentation rate elevated at 40 as well.  Was started on iron with vitamin C.    -2/27/2020 initial [facility] hematology consultation: He had dark tarry stools predating the institution of his current iron and extensive prior endoscopic work-up as outlined above.  I will check his methylmalonic acid and homocystine along with repeat B12 and folate and with his elevated C-reactive protein and sedimentation rate will check a serum immunoelectrophoresis and light chains.  Given his prior drinking history despite the fact that his liver enzymes have been normal I strongly suspect portal hypertension and I suspect the GI bleeding may be related to this but we will get records from .  If we do not get answers from this then we will proceed with bone marrow biopsy.  If there is a monoclonal gammopathy we will also proceed with bone marrow biopsy.  With his putative history of rheumatoid arthritis he could have splenomegaly with Felty syndrome as well and I will check his ROSARIO and rheumatoid factor.  Discussed with patient face-to-face 70 minutes greater than 50% spent counseling regarding this plan as outlined above.        Yuval Herrera MD    2/27/2020

## 2020-02-28 LAB
ALBUMIN SERPL-MCNC: 3.5 G/DL (ref 2.9–4.4)
ALBUMIN/GLOB SERPL: 1.1 {RATIO} (ref 0.7–1.7)
ALPHA1 GLOB FLD ELPH-MCNC: 0.2 G/DL (ref 0–0.4)
ALPHA2 GLOB SERPL ELPH-MCNC: 1 G/DL (ref 0.4–1)
ANA SER QL IA: NEGATIVE
B-GLOBULIN SERPL ELPH-MCNC: 0.8 G/DL (ref 0.7–1.3)
CYTOLOGIST CVX/VAG CYTO: NORMAL
ETHNIC BACKGROUND STATED: 22.4 MIU/ML (ref 2.6–18.5)
GAMMA GLOB SERPL ELPH-MCNC: 1.4 G/DL (ref 0.4–1.8)
GLOBULIN SER CALC-MCNC: 3.4 G/DL (ref 2.2–3.9)
HEMOSIDERIN UR QL: NEGATIVE
IGA SERPL-MCNC: 205 MG/DL (ref 61–437)
IGG SERPL-MCNC: 1525 MG/DL (ref 700–1600)
IGM SERPL-MCNC: 38 MG/DL (ref 15–143)
INTERPRETATION SERPL IEP-IMP: NORMAL
KAPPA LC SERPL-MCNC: 57.9 MG/L (ref 3.3–19.4)
KAPPA LC/LAMBDA SER: 1.89 {RATIO} (ref 0.26–1.65)
LAMBDA LC FREE SERPL-MCNC: 30.7 MG/L (ref 5.7–26.3)
Lab: NORMAL
M-SPIKE: NORMAL G/DL
PATH INTERP BLD-IMP: NORMAL
PROT SERPL-MCNC: 6.9 G/DL (ref 6–8.5)

## 2020-03-01 LAB
G6PD RBC-CCNC: 279 U/10E12 RBC (ref 146–376)
RBC # BLD AUTO: 3.88 X10E6/UL (ref 4.14–5.8)

## 2020-03-02 LAB — HGB FREE PLAS-MCNC: NORMAL MG/L

## 2020-03-03 LAB
Lab: NORMAL
METHYLMALONATE SERPL-SCNC: 227 NMOL/L (ref 0–378)

## 2020-03-27 ENCOUNTER — TELEMEDICINE (OUTPATIENT)
Dept: ONCOLOGY | Facility: CLINIC | Age: 75
End: 2020-03-27

## 2020-03-27 DIAGNOSIS — D61.818 PANCYTOPENIA (HCC): Primary | ICD-10-CM

## 2020-03-27 PROCEDURE — 99214 OFFICE O/P EST MOD 30 MIN: CPT | Performed by: INTERNAL MEDICINE

## 2020-03-27 NOTE — PROGRESS NOTES
Telehealth hematology visit:  Attempted video visit but had to go with phone visit after failure to download dakota by patient.    CHIEF COMPLAINT: anemia    Problem List:  Oncology/Hematology History    1.  Pancytopenia  2.  Chronic low back pain   3.  Chronic kidney disease  4.  Type 2 diabetes  5.  Hyperlipidemia  6.  Hypertension  7.  Hypogonadism  8.  Depression  9. COPD  10.  Obstructive sleep apnea  11.  Reflux   12.  Gout  13 osteoarthritis  14.  BPH with ED  15.  BMI 36  16.  Putative history of rheumatoid arthritis    Hematology history:  -Longstanding history of heme positive stools dating back to the late 80s with at least 5 colonoscopies by Dr. Perez including the last 1/2018 as well as EGDs and a capsule endoscopy that apparently showed scattered blood that they could not do much about.In the [facility] system we have hemoglobins of 9.8 and platelet of 121,000 with white count 4880 as of June 2016 and December 2015 white count 3020 with platelets 126,000 hemoglobin 11.4.  Does have a history of heavy alcohol use but none in the past several years.  -8/7/2019 Hemoccult negative  -1/30/2020 reticulocyte count 1.4%.  B12 618.  Iron slightly low 48.  White count 2700 with hemoglobin 11.6 MCV 88 and normal red cell distribution width with platelets 124,000.  Normal thyroid functions.  -2/4/2020 white count 3100 hemoglobin 11.7 with normal MCV and red cell distribution with platelets 126,000 normal liver enzymes and bilirubin including fractionation.  Creatinine 1.36 GFR greater than 60 with normal calcium 9.1.  .  C-reactive protein 9.67 with sedimentation rate elevated at 40 as well.  Was started on iron with vitamin C.    -2/27/2020 initial [facility] hematology consultation: He had dark tarry stools predating the institution of his current iron and extensive prior endoscopic work-up as outlined above.  I will check his methylmalonic acid and homocystine along with repeat B12 and folate and with his elevated  C-reactive protein and sedimentation rate will check a serum immunoelectrophoresis and light chains.  Given his prior drinking history despite the fact that his liver enzymes have been normal I strongly suspect portal hypertension and I suspect the GI bleeding may be related to this but we will get records from .  If we do not get answers from this then we will proceed with bone marrow biopsy.  If there is a monoclonal gammopathy we will also proceed with bone marrow biopsy.  With his putative history of rheumatoid arthritis he could have splenomegaly with Felty syndrome as well and I will check his ROSARIO and rheumatoid factor.    -2/27/20 data:  Hgb 11.4 o/w normal CBC  Periph. Smear mild hypochromic anemia with mature WBC's    ZACKARY neg  Haptogb 213 high  Retic 2 %    Nl bili direct and indirect  Urine hemosiderin negative  Plasma free hemoglobin normal 7  G6PD 279 normal    ROSARIO neg  RF <10    Epo 22.4    Cr 1.6     nl  B12>2k  Homocysteine 10.5  Folate >20    Liver Spleen US:  Portal vein 1.7cm dilated  Smv Dilated 1.3 cm  But nl spleen size  Hepatomegaly with abnormal liver echogenicity.                    Pancytopenia (CMS/HCC)    2/26/2020 Initial Diagnosis     Pancytopenia (CMS/HCC)         HISTORY OF PRESENT ILLNESS:  The patient is a 75 y.o. male, here for follow up on management of anemia.  I reviewed the above data with patient.  Hemoglobin rising with NN indices. No significant leucopenia or thrombocytopenia.Feeling fit.  No change in color or caliber of stool.       Past Medical History:   Diagnosis Date   • Arthritis    • Asthma    • Bowel trouble    • Chondrocalcinosis of right knee    • Colitis    • COPD (chronic obstructive pulmonary disease) (CMS/HCC)    • Diabetes mellitus (CMS/HCC)    • Esophagitis    • Gout    • H/O bladder problems    • Heart murmur    • Hypertension    • IBS (irritable bowel syndrome)    • JONAH on CPAP    • Primary osteoarthritis of both knees    • Rheumatoid  arthritis (CMS/HCC)    • Skin problem    • SOB (shortness of breath)      Past Surgical History:   Procedure Laterality Date   • COLONOSCOPY     • KNEE ARTHROSCOPY Left    • PARTIAL KNEE ARTHROPLASTY Left    • SKIN BIOPSY     • STOMACH SURGERY     • VASECTOMY         Allergies   Allergen Reactions   • Lisinopril Swelling   • Benazepril Swelling       Family History and Social History reviewed and changed as necessary      REVIEW OF SYSTEM:   Review of Systems   Constitutional: Negative for appetite change, chills, diaphoresis, fatigue, fever and unexpected weight change.   HENT:   Negative for mouth sores, sore throat and trouble swallowing.    Eyes: Negative for icterus.   Respiratory: Negative for cough, hemoptysis and shortness of breath.    Cardiovascular: Negative for chest pain, leg swelling and palpitations.   Gastrointestinal: Negative for abdominal distention, abdominal pain, blood in stool, constipation, diarrhea, nausea and vomiting.   Endocrine: Negative for hot flashes.   Genitourinary: Negative for bladder incontinence, difficulty urinating, dysuria, frequency and hematuria.    Musculoskeletal: Negative for gait problem, neck pain and neck stiffness.   Skin: Negative for rash.   Neurological: Negative for dizziness, gait problem, headaches, light-headedness and numbness.   Hematological: Negative for adenopathy. Does not bruise/bleed easily.   Psychiatric/Behavioral: Negative for depression. The patient is not nervous/anxious.    All other systems reviewed and are negative.       PHYSICAL EXAM    Unable to examine by phone due to Covid mitigation.      Lab Results   Component Value Date    HGB 11.4 (L) 02/27/2020    HCT 34.0 (L) 02/27/2020    MCV 90.2 02/27/2020     02/27/2020    WBC 3.60 02/27/2020    NEUTROABS 2.00 02/27/2020    LYMPHSABS 1.20 02/27/2020    MONOSABS 0.40 02/27/2020    EOSABS 0.13 05/19/2016    BASOSABS 0.05 05/19/2016       Lab Results   Component Value Date    GLUCOSE 105 (H)  02/27/2020    BUN 25 (H) 02/27/2020    CREATININE 1.50 (H) 02/27/2020     02/27/2020    K 4.1 02/27/2020     02/27/2020    CO2 29.0 02/27/2020    CALCIUM 9.1 02/27/2020    PROTEINTOT 7.7 02/27/2020    ALBUMIN 4.20 02/27/2020    ALBUMIN 3.5 02/27/2020    BILITOT 0.5 02/27/2020    BILITOT 0.5 02/27/2020    ALKPHOS 65 02/27/2020    AST 16 02/27/2020    ALT 14 02/27/2020                   ASSESSMENT & PLAN:    1. Anemia  2. History pancytopenia  3. Chronic renal disease  4. Hepatomegaly without splenomegaly      Discussion: Had WBC 3,020 in 2015 with hgb 11.4 and plt 126.  Hgb 9.8 in 6/2016 with plt 121 k and nl WBC.  No clear cut hemolytic process.  No increase in calcium or Total protein to albumin ratio to suggest plasma cell dyscrasia with CKD, despite balanced light chain elevation and no M spike.  Some Hepatomegaly with portal vein and Smv dilation suggesting portal HTN but no splenomegaly...therefore not certain of sequestration. No b12 or folate deficiency.  With Covid 19 pandemic, further w/u at this junction not critical.  No marrow biopsy for now.  Will see back in 4-5 months and if counts worsen then work up further. Can fu with Dr. Perez for liver elastography etc and fu on hx of GI loss in future.  Discussed with patient from 11:45 to 12:25 total elapsed time of 40 minutes discussing all this by phone in detail for covid mitigation.      Yuval Herrera MD    03/27/2020

## 2020-04-06 ENCOUNTER — TRANSCRIBE ORDERS (OUTPATIENT)
Dept: ADMINISTRATIVE | Facility: HOSPITAL | Age: 75
End: 2020-04-06

## 2020-04-06 DIAGNOSIS — Z87.891 PERSONAL HISTORY OF TOBACCO USE, PRESENTING HAZARDS TO HEALTH: Primary | ICD-10-CM

## 2020-05-18 ENCOUNTER — HOSPITAL ENCOUNTER (OUTPATIENT)
Dept: CT IMAGING | Facility: HOSPITAL | Age: 75
Discharge: HOME | End: 2020-05-18
Admitting: FAMILY MEDICINE

## 2020-05-18 DIAGNOSIS — Z87.891 PERSONAL HISTORY OF TOBACCO USE, PRESENTING HAZARDS TO HEALTH: ICD-10-CM

## 2020-05-18 PROCEDURE — G0297 LDCT FOR LUNG CA SCREEN: HCPCS

## 2020-07-27 ENCOUNTER — LAB (OUTPATIENT)
Dept: LAB | Facility: HOSPITAL | Age: 75
End: 2020-07-27

## 2020-07-27 ENCOUNTER — HOSPITAL ENCOUNTER (OUTPATIENT)
Dept: GENERAL RADIOLOGY | Facility: HOSPITAL | Age: 75
Discharge: HOME | End: 2020-07-27
Admitting: INTERNAL MEDICINE

## 2020-07-27 ENCOUNTER — OFFICE VISIT (OUTPATIENT)
Dept: ONCOLOGY | Facility: CLINIC | Age: 75
End: 2020-07-27

## 2020-07-27 VITALS
TEMPERATURE: 98.4 F | SYSTOLIC BLOOD PRESSURE: 155 MMHG | BODY MASS INDEX: 34.36 KG/M2 | RESPIRATION RATE: 18 BRPM | OXYGEN SATURATION: 94 % | HEIGHT: 70 IN | HEART RATE: 70 BPM | DIASTOLIC BLOOD PRESSURE: 73 MMHG | WEIGHT: 240 LBS

## 2020-07-27 DIAGNOSIS — D61.818 PANCYTOPENIA (HCC): ICD-10-CM

## 2020-07-27 DIAGNOSIS — D61.818 PANCYTOPENIA (HCC): Primary | ICD-10-CM

## 2020-07-27 LAB
ALBUMIN SERPL-MCNC: 3.9 G/DL (ref 3.5–5.2)
ALBUMIN/GLOB SERPL: 1.1 G/DL
ALP SERPL-CCNC: 59 U/L (ref 39–117)
ALT SERPL W P-5'-P-CCNC: 14 U/L (ref 1–41)
ANION GAP SERPL CALCULATED.3IONS-SCNC: 7 MMOL/L (ref 5–15)
AST SERPL-CCNC: 16 U/L (ref 1–40)
B2 MICROGLOB SERPL-MCNC: 3.2 MG/L (ref 0.8–2.2)
BILIRUB SERPL-MCNC: 0.5 MG/DL (ref 0–1.2)
BUN SERPL-MCNC: 15 MG/DL (ref 8–23)
BUN/CREAT SERPL: 11.5 (ref 7–25)
CALCIUM SPEC-SCNC: 9.3 MG/DL (ref 8.6–10.5)
CHLORIDE SERPL-SCNC: 108 MMOL/L (ref 98–107)
CO2 SERPL-SCNC: 29 MMOL/L (ref 22–29)
CREAT SERPL-MCNC: 1.3 MG/DL (ref 0.76–1.27)
CRP SERPL-MCNC: 0.49 MG/DL (ref 0–0.5)
ERYTHROCYTE [DISTWIDTH] IN BLOOD BY AUTOMATED COUNT: 13.2 % (ref 12.3–15.4)
ERYTHROCYTE [SEDIMENTATION RATE] IN BLOOD: 24 MM/HR (ref 0–20)
GFR SERPL CREATININE-BSD FRML MDRD: 65 ML/MIN/1.73
GLOBULIN UR ELPH-MCNC: 3.4 GM/DL
GLUCOSE SERPL-MCNC: 108 MG/DL (ref 65–99)
HCT VFR BLD AUTO: 37.2 % (ref 37.5–51)
HGB BLD-MCNC: 11.6 G/DL (ref 13–17.7)
LYMPHOCYTES # BLD AUTO: 1.5 10*3/MM3 (ref 0.7–3.1)
LYMPHOCYTES NFR BLD AUTO: 41.4 % (ref 19.6–45.3)
MCH RBC QN AUTO: 29 PG (ref 26.6–33)
MCHC RBC AUTO-ENTMCNC: 31.1 G/DL (ref 31.5–35.7)
MCV RBC AUTO: 93.1 FL (ref 79–97)
MONOCYTES # BLD AUTO: 0.3 10*3/MM3 (ref 0.1–0.9)
MONOCYTES NFR BLD AUTO: 8.6 % (ref 5–12)
NEUTROPHILS NFR BLD AUTO: 1.8 10*3/MM3 (ref 1.7–7)
NEUTROPHILS NFR BLD AUTO: 50 % (ref 42.7–76)
PLATELET # BLD AUTO: 137 10*3/MM3 (ref 140–450)
PMV BLD AUTO: 6.9 FL (ref 6–12)
POTASSIUM SERPL-SCNC: 4.2 MMOL/L (ref 3.5–5.2)
PROT SERPL-MCNC: 7.3 G/DL (ref 6–8.5)
RBC # BLD AUTO: 4 10*6/MM3 (ref 4.14–5.8)
SODIUM SERPL-SCNC: 144 MMOL/L (ref 136–145)
WBC # BLD AUTO: 3.6 10*3/MM3 (ref 3.4–10.8)

## 2020-07-27 PROCEDURE — 99214 OFFICE O/P EST MOD 30 MIN: CPT | Performed by: INTERNAL MEDICINE

## 2020-07-27 PROCEDURE — 82232 ASSAY OF BETA-2 PROTEIN: CPT

## 2020-07-27 PROCEDURE — 80053 COMPREHEN METABOLIC PANEL: CPT

## 2020-07-27 PROCEDURE — 86140 C-REACTIVE PROTEIN: CPT

## 2020-07-27 PROCEDURE — 77075 RADEX OSSEOUS SURVEY COMPL: CPT

## 2020-07-27 PROCEDURE — 82784 ASSAY IGA/IGD/IGG/IGM EACH: CPT

## 2020-07-27 PROCEDURE — 83883 ASSAY NEPHELOMETRY NOT SPEC: CPT

## 2020-07-27 PROCEDURE — 85025 COMPLETE CBC W/AUTO DIFF WBC: CPT

## 2020-07-27 PROCEDURE — 36415 COLL VENOUS BLD VENIPUNCTURE: CPT

## 2020-07-27 PROCEDURE — 86334 IMMUNOFIX E-PHORESIS SERUM: CPT

## 2020-07-27 PROCEDURE — 84165 PROTEIN E-PHORESIS SERUM: CPT

## 2020-07-27 PROCEDURE — 85652 RBC SED RATE AUTOMATED: CPT

## 2020-07-27 NOTE — ACP (ADVANCE CARE PLANNING)
Advance Care Planning   ACP discussion was held with the patient during this visit. Patient does not have an advance directive, information provided.

## 2020-07-27 NOTE — PROGRESS NOTES
CHIEF COMPLAINT: Follow-up pancytopenia    Problem List:  Oncology/Hematology History    1.  Pancytopenia with  elevation of serum kappa and lambda light chains and gastric angios ectasias on 7/17/2020 EGD  2.  Chronic low back pain   3.  Chronic kidney disease  4.  Type 2 diabetes  5.  Hyperlipidemia  6.  Hypertension  7.  Hypogonadism  8.  Depression  9. COPD  10.  Obstructive sleep apnea  11.  Reflux   12.  Gout  13 osteoarthritis  14.  BPH with ED  15.  BMI 36  16.  Putative history of rheumatoid arthritis    Hematology history:  -Longstanding history of heme positive stools dating back to the late 80s with at least 5 colonoscopies by Dr. Perez including the last 1/2018 as well as EGDs and a capsule endoscopy that apparently showed scattered blood that they could not do much about.In the [facility] system we have hemoglobins of 9.8 and platelet of 121,000 with white count 4880 as of June 2016 and December 2015 white count 3020 with platelets 126,000 hemoglobin 11.4.  Does have a history of heavy alcohol use but none in the past several years.  -8/7/2019 Hemoccult negative  -1/30/2020 reticulocyte count 1.4%.  B12 618.  Iron slightly low 48.  White count 2700 with hemoglobin 11.6 MCV 88 and normal red cell distribution width with platelets 124,000.  Normal thyroid functions.  -2/4/2020 white count 3100 hemoglobin 11.7 with normal MCV and red cell distribution with platelets 126,000 normal liver enzymes and bilirubin including fractionation.  Creatinine 1.36 GFR greater than 60 with normal calcium 9.1.  .  C-reactive protein 9.67 with sedimentation rate elevated at 40 as well.  Was started on iron with vitamin C.    -2/27/2020 initial [facility] hematology consultation: He had dark tarry stools predating the institution of his current iron and extensive prior endoscopic work-up as outlined above.  I will check his methylmalonic acid and homocystine along with repeat B12 and folate and with his elevated C-reactive  protein and sedimentation rate will check a serum immunoelectrophoresis and light chains.  Given his prior drinking history despite the fact that his liver enzymes have been normal I strongly suspect portal hypertension and I suspect the GI bleeding may be related to this but we will get records from .  If we do not get answers from this then we will proceed with bone marrow biopsy.  If there is a monoclonal gammopathy we will also proceed with bone marrow biopsy.  With his putative history of rheumatoid arthritis he could have splenomegaly with Felty syndrome as well and I will check his ROSARIO and rheumatoid factor.    -2/27/20 data:  Hgb 11.4 o/w normal CBC  Periph. Smear mild hypochromic anemia with mature WBC's    ZACKARY neg  Haptogb 213 high  Retic 2 %    Nl bili direct and indirect  Urine hemosiderin negative  Plasma free hemoglobin normal 7  G6PD 279 normal    ROSARIO neg  RF <10    Epo 22.4    Cr 1.6     nl  B12>2k  Homocysteine 10.5  Folate >20    Liver Spleen US:  Portal vein 1.7cm dilated  Smv Dilated 1.3 cm  But nl spleen size  Hepatomegaly with abnormal liver echogenicity.    Serum K 57.9, lambda 30.7, ratio 1.89.  Serum M spike zero    -5/26/2020 CT chest low-dose without contrast shows chronic granulomatous nodules lung RADS 2 with recommended annual follow-up.  Increased prominence of mucosal thickening distal esophagus compared to 2017 suggestive of esophagitis.    -7/17/2020 gastric angioma ectasias on EGD with Dr. Perez.  No esophageal abnormalities to corroborate with the above CT findings.                      Pancytopenia (CMS/HCC)    2/26/2020 Initial Diagnosis     Pancytopenia (CMS/HCC)         HISTORY OF PRESENT ILLNESS:  The patient is a 75 y.o. male, here for follow up on management of pancytopenia with possible portal hypertension but also with chronic kidney disease and elevated light chains.  Had some distal esophageal thickening on a CT done the end of May.  7/17/2020 EGD  with Dr. Perez showed gastric angioma ectasias with no abnormality of the esophagus.      Past Medical History:   Diagnosis Date   • Arthritis    • Asthma    • Bowel trouble    • Chondrocalcinosis of right knee    • Colitis    • COPD (chronic obstructive pulmonary disease) (CMS/HCC)    • Diabetes mellitus (CMS/HCC)    • Esophagitis    • Gout    • H/O bladder problems    • Heart murmur    • Hypertension    • IBS (irritable bowel syndrome)    • JONAH on CPAP    • Primary osteoarthritis of both knees    • Rheumatoid arthritis (CMS/HCC)    • Skin problem    • SOB (shortness of breath)      Past Surgical History:   Procedure Laterality Date   • COLONOSCOPY     • KNEE ARTHROSCOPY Left    • PARTIAL KNEE ARTHROPLASTY Left    • SKIN BIOPSY     • STOMACH SURGERY     • VASECTOMY         Allergies   Allergen Reactions   • Lisinopril Swelling   • Benazepril Swelling       Family History and Social History reviewed and changed as necessary      REVIEW OF SYSTEM:   Review of Systems   Constitutional: Negative for appetite change, chills, diaphoresis, fatigue, fever and unexpected weight change.   HENT:   Negative for mouth sores, sore throat and trouble swallowing.    Eyes: Negative for icterus.   Respiratory: Negative for cough, hemoptysis and shortness of breath.    Cardiovascular: Negative for chest pain, leg swelling and palpitations.   Gastrointestinal: Negative for abdominal distention, abdominal pain, blood in stool, constipation, diarrhea, nausea and vomiting.   Endocrine: Negative for hot flashes.   Genitourinary: Negative for bladder incontinence, difficulty urinating, dysuria, frequency and hematuria.    Musculoskeletal: Negative for gait problem, neck pain and neck stiffness.   Skin: Negative for rash.   Neurological: Negative for dizziness, gait problem, headaches, light-headedness and numbness.   Hematological: Negative for adenopathy. Does not bruise/bleed easily.   Psychiatric/Behavioral: Negative for depression. The  "patient is not nervous/anxious.    All other systems reviewed and are negative.       PHYSICAL EXAM    Vitals:    07/27/20 1058   BP: 155/73   Pulse: 70   Resp: 18   Temp: 98.4 °F (36.9 °C)   SpO2: 94%   Weight: 109 kg (240 lb)   Height: 177.8 cm (70\")     Vitals:    07/27/20 1058   PainSc: 0-No pain  Comment: No new pain        Constitutional: Appears well-developed and well-nourished. No distress.   ECOG: (1) Restricted in Physically Strenuous Activity, Ambulatory & Able to Do Work of Light Nature  HENT:   Head: Normocephalic.   Mouth/Throat: Oropharynx is clear and moist.   Eyes: Conjunctivae are normal. Pupils are equal, round, and reactive to light. No scleral icterus.   Neck: Neck supple. No JVD present. No thyromegaly present.   Cardiovascular: Normal rate, regular rhythm and normal heart sounds.    Pulmonary/Chest: Breath sounds normal. No respiratory distress.   Abdominal: Soft. Exhibits no distension and no mass. There is no hepatosplenomegaly. There is no tenderness. There is no rebound and no guarding.   Musculoskeletal:Exhibits no edema, tenderness or deformity.   Neurological: Alert and oriented to person, place, and time. Exhibits normal muscle tone.   Skin: No ecchymosis, no petechiae and no rash noted. Not diaphoretic. No cyanosis. Nails show no clubbing.   Psychiatric: Normal mood and affect.   Vitals reviewed.      Lab Results   Component Value Date    HGB 11.6 (L) 07/27/2020    HCT 37.2 (L) 07/27/2020    MCV 93.1 07/27/2020     (L) 07/27/2020    WBC 3.60 07/27/2020    NEUTROABS 1.80 07/27/2020    LYMPHSABS 1.50 07/27/2020    MONOSABS 0.30 07/27/2020    EOSABS 0.13 05/19/2016    BASOSABS 0.05 05/19/2016       Lab Results   Component Value Date    GLUCOSE 105 (H) 02/27/2020    BUN 25 (H) 02/27/2020    CREATININE 1.50 (H) 02/27/2020     02/27/2020    K 4.1 02/27/2020     02/27/2020    CO2 29.0 02/27/2020    CALCIUM 9.1 02/27/2020    PROTEINTOT 7.7 02/27/2020    ALBUMIN 4.20 " 02/27/2020    ALBUMIN 3.5 02/27/2020    BILITOT 0.5 02/27/2020    BILITOT 0.5 02/27/2020    ALKPHOS 65 02/27/2020    AST 16 02/27/2020    ALT 14 02/27/2020                   ASSESSMENT & PLAN:  1. Pancytopenia with light chain elevation  2. Chronic renal disease  3. Gastric vascular ectasias   4. Hepatomegaly without splenomegaly    Discussion: As stated previously I suspect his anemia is a combination of anemia renal disease, possible sequestration with the hepatomegaly and possible contribution from the gastric vascular ectasias on EGD with Dr. Perez done a week ago.  We will repeat his myeloma panel and if he has persistent light chains we will need to check bone marrow to ensure no working plasma cell dyscrasia.  We will also check his bone survey.  I will do a telephone visit with [employer] video visit in a few weeks to go over the results of this.  Discussed with patient face-to-face 25 minutes greater than 50% spent counseling regarding this plan.        Yuval Herrera MD    07/27/2020

## 2020-07-28 LAB
ALBUMIN SERPL-MCNC: 3.5 G/DL (ref 2.9–4.4)
ALBUMIN/GLOB SERPL: 1.1 {RATIO} (ref 0.7–1.7)
ALPHA1 GLOB FLD ELPH-MCNC: 0.2 G/DL (ref 0–0.4)
ALPHA2 GLOB SERPL ELPH-MCNC: 0.9 G/DL (ref 0.4–1)
B-GLOBULIN SERPL ELPH-MCNC: 0.8 G/DL (ref 0.7–1.3)
GAMMA GLOB SERPL ELPH-MCNC: 1.3 G/DL (ref 0.4–1.8)
GLOBULIN SER CALC-MCNC: 3.2 G/DL (ref 2.2–3.9)
IGA SERPL-MCNC: 171 MG/DL (ref 61–437)
IGG SERPL-MCNC: 1393 MG/DL (ref 603–1613)
IGM SERPL-MCNC: 33 MG/DL (ref 15–143)
INTERPRETATION SERPL IEP-IMP: NORMAL
KAPPA LC SERPL-MCNC: 52.8 MG/L (ref 3.3–19.4)
KAPPA LC/LAMBDA SER: 2.25 {RATIO} (ref 0.26–1.65)
LAMBDA LC FREE SERPL-MCNC: 23.5 MG/L (ref 5.7–26.3)
Lab: NORMAL
M-SPIKE: NORMAL G/DL
PROT SERPL-MCNC: 6.7 G/DL (ref 6–8.5)

## 2020-07-30 ENCOUNTER — LAB (OUTPATIENT)
Dept: LAB | Facility: HOSPITAL | Age: 75
End: 2020-07-30

## 2020-07-30 DIAGNOSIS — D61.818 PANCYTOPENIA (HCC): ICD-10-CM

## 2020-07-30 PROCEDURE — 81050 URINALYSIS VOLUME MEASURE: CPT

## 2020-07-30 PROCEDURE — 83883 ASSAY NEPHELOMETRY NOT SPEC: CPT

## 2020-08-03 LAB
FREE KAPPA LT CHAINS, 24HR: 89.28 MG/24 HR
FREE LAMBDA LT CHAINS, 24 HR: 14.12 MG/24 HR
KAPPA LC UR-MCNC: 40.58 MG/L (ref 0.63–113.79)
KAPPA LC/LAMBDA UR: 6.32 {RATIO} (ref 1.03–31.76)
LAMBDA LC UR-MCNC: 6.42 MG/L (ref 0.47–11.77)

## 2020-08-17 ENCOUNTER — APPOINTMENT (OUTPATIENT)
Dept: PREADMISSION TESTING | Facility: HOSPITAL | Age: 75
End: 2020-08-17

## 2020-08-17 ENCOUNTER — OFFICE VISIT (OUTPATIENT)
Dept: ONCOLOGY | Facility: CLINIC | Age: 75
End: 2020-08-17

## 2020-08-17 DIAGNOSIS — D61.818 PANCYTOPENIA (HCC): Primary | ICD-10-CM

## 2020-08-17 PROCEDURE — 99214 OFFICE O/P EST MOD 30 MIN: CPT | Performed by: INTERNAL MEDICINE

## 2020-08-17 PROCEDURE — U0004 COV-19 TEST NON-CDC HGH THRU: HCPCS

## 2020-08-17 PROCEDURE — C9803 HOPD COVID-19 SPEC COLLECT: HCPCS

## 2020-08-17 PROCEDURE — U0002 COVID-19 LAB TEST NON-CDC: HCPCS

## 2020-08-17 NOTE — H&P (VIEW-ONLY)
Telehealth follow-up visit: This was an audio and video enabled telemedicine encounter.  Verbal consent given.  For COVID 19 avoidance per CDC guidelines.  Discussed with patient face-to-face 30 minutes greater than 50% spent counseling    CHIEF COMPLAINT: Pancytopenia with kappa light chains    Problem List:  Oncology/Hematology History    1.  Pancytopenia with combination of anemia renal disease, possible sequestration with the hepatomegaly and possible contribution from the gastric vascular ectasias on EGD  elevation of serum kappa and lambda light chains and gastric angios ectasias on 7/17/2020 EGD  2.  Chronic low back pain   3.  Chronic kidney disease  4.  Type 2 diabetes  5.  Hyperlipidemia  6.  Hypertension  7.  Hypogonadism  8.  Depression  9. COPD  10.  Obstructive sleep apnea  11.  Reflux   12.  Gout  13 osteoarthritis  14.  BPH with ED  15.  BMI 36  16.  Putative history of rheumatoid arthritis    Hematology history:  -Longstanding history of heme positive stools dating back to the late 80s with at least 5 colonoscopies by Dr. Perez including the last 1/2018 as well as EGDs and a capsule endoscopy that apparently showed scattered blood that they could not do much about.In the [facility] system we have hemoglobins of 9.8 and platelet of 121,000 with white count 4880 as of June 2016 and December 2015 white count 3020 with platelets 126,000 hemoglobin 11.4.  Does have a history of heavy alcohol use but none in the past several years.  -8/7/2019 Hemoccult negative  -1/30/2020 reticulocyte count 1.4%.  B12 618.  Iron slightly low 48.  White count 2700 with hemoglobin 11.6 MCV 88 and normal red cell distribution width with platelets 124,000.  Normal thyroid functions.  -2/4/2020 white count 3100 hemoglobin 11.7 with normal MCV and red cell distribution with platelets 126,000 normal liver enzymes and bilirubin including fractionation.  Creatinine 1.36 GFR greater than 60 with normal calcium 9.1.  .  C-reactive  protein 9.67 with sedimentation rate elevated at 40 as well.  Was started on iron with vitamin C.    -2/27/2020 initial [facility] hematology consultation: He had dark tarry stools predating the institution of his current iron and extensive prior endoscopic work-up as outlined above.  I will check his methylmalonic acid and homocystine along with repeat B12 and folate and with his elevated C-reactive protein and sedimentation rate will check a serum immunoelectrophoresis and light chains.  Given his prior drinking history despite the fact that his liver enzymes have been normal I strongly suspect portal hypertension and I suspect the GI bleeding may be related to this but we will get records from .  If we do not get answers from this then we will proceed with bone marrow biopsy.  If there is a monoclonal gammopathy we will also proceed with bone marrow biopsy.  With his putative history of rheumatoid arthritis he could have splenomegaly with Felty syndrome as well and I will check his ROSARIO and rheumatoid factor.    -2/27/20 data:  Hgb 11.4 o/w normal CBC  Periph. Smear mild hypochromic anemia with mature WBC's    ZACKARY neg  Haptogb 213 high  Retic 2 %    Nl bili direct and indirect  Urine hemosiderin negative  Plasma free hemoglobin normal 7  G6PD 279 normal    ROSARIO neg  RF <10    Epo 22.4    Cr 1.6     nl  B12>2k  Homocysteine 10.5  Folate >20    Liver Spleen US:  Portal vein 1.7cm dilated  Smv Dilated 1.3 cm  But nl spleen size  Hepatomegaly with abnormal liver echogenicity.    Serum K 57.9, lambda 30.7, ratio 1.89.  Serum M spike zero    -5/26/2020 CT chest low-dose without contrast shows chronic granulomatous nodules lung RADS 2 with recommended annual follow-up.  Increased prominence of mucosal thickening distal esophagus compared to 2017 suggestive of esophagitis.    -7/17/2020 gastric angioma ectasias on EGD with Dr. Perez.  No esophageal abnormalities to corroborate with the above CT  findings.    -7/27/2020 data:  White count 3600 with hemoglobin 11.6 platelets 137,000.  Creatinine 1.3 with GFR 65  Beta-2 microglobulin 3.2, upper limit 2.2  C-reactive protein 0.49/sedimentation rate 24  Serum immunoelectrophoresis showed no monoclonal spike  Serum free kappa light chains 52.8 with ratio of 2.25  Urine kappa light chains normal 41 with lambda normal 6 and ratio normal at 6  Bone survey essentially negative for myeloma save for a small minor posterior calvarial abnormality most likely venous lake.  Coincidental cardiomegaly with mild pulmonary vascular congestion.      -8/4/2020 data:   Sedimentation rate 37  White count 3100 hemoglobin 11.7 with platelets 115,000  Creatinine 1.27 with GFR greater than 60 and calcium 9.1 normal with normal total protein and albumin    -8/17/2020 hematology follow-up visit: Reviewed above data with patient.  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  Given persistence of monoclonal light chain even if the cranial abnormality is just a venous lake would still complete work-up with bone marrow biopsy to ensure no plasma cell dyscrasia, myelodysplasia, aplasia.                        Pancytopenia (CMS/HCC)    2/26/2020 Initial Diagnosis     Pancytopenia (CMS/HCC)         HISTORY OF PRESENT ILLNESS:  The patient is a 75 y.o. male, here for follow up on management of pancytopenia with kappa light chains.      Past Medical History:   Diagnosis Date   • Arthritis    • Asthma    • Bowel trouble    • Chondrocalcinosis of right knee    • Colitis    • COPD (chronic obstructive pulmonary disease) (CMS/HCC)    • Diabetes mellitus (CMS/HCC)    • Esophagitis    • Gout    • H/O bladder problems    • Heart murmur    • Hypertension    • IBS (irritable bowel syndrome)     • JONAH on CPAP    • Primary osteoarthritis of both knees    • Rheumatoid arthritis (CMS/HCC)    • Skin problem    • SOB (shortness of breath)      Past Surgical History:   Procedure Laterality Date   • COLONOSCOPY     • KNEE ARTHROSCOPY Left    • PARTIAL KNEE ARTHROPLASTY Left    • SKIN BIOPSY     • STOMACH SURGERY     • VASECTOMY         Allergies   Allergen Reactions   • Lisinopril Swelling   • Benazepril Swelling       Family History and Social History reviewed and changed as necessary      REVIEW OF SYSTEM:   Review of Systems   Constitutional: Negative for appetite change, chills, diaphoresis, fatigue, fever and unexpected weight change.   HENT:   Negative for mouth sores, sore throat and trouble swallowing.    Eyes: Negative for icterus.   Respiratory: Negative for cough, hemoptysis and shortness of breath.    Cardiovascular: Negative for chest pain, leg swelling and palpitations.   Gastrointestinal: Negative for abdominal distention, abdominal pain, blood in stool, constipation, diarrhea, nausea and vomiting.   Endocrine: Negative for hot flashes.   Genitourinary: Negative for bladder incontinence, difficulty urinating, dysuria, frequency and hematuria.    Musculoskeletal: Negative for gait problem, neck pain and neck stiffness.   Skin: Negative for rash.   Neurological: Negative for dizziness, gait problem, headaches, light-headedness and numbness.   Hematological: Negative for adenopathy. Does not bruise/bleed easily.   Psychiatric/Behavioral: Negative for depression. The patient is not nervous/anxious.    All other systems reviewed and are negative.       PHYSICAL EXAM    There were no vitals filed for this visit.  Vitals:    08/17/20 0904   PainSc: 0-No pain        Constitutional: Appears well-developed and well-nourished. No distress.   ECOG: (0) Fully Active - Able to Carry On All Pre-disease Performance Without Restriction  HENT:   Head: Normocephalic.   Mouth/Throat: Oropharynx is clear and moist.    Eyes: Conjunctivae are normal.  No scleral icterus.   Neck:  . No visible thyromegaly present.   Cardiovascular: No JVD visible  Pulmonary/Chest: No visible respiratory distress.   Abdominal: Soft. Exhibits no visible distension.    Musculoskeletal:Exhibits no edema, tenderness or deformity.   Neurological: Alert and oriented to person, place, and time.  Moving all extremities  Skin: No ecchymosis, no petechiae and no rash noted. Not diaphoretic. No cyanosis. Nails show no clubbing.   Psychiatric: Normal mood and affect.   Vitals reviewed.      Lab Results   Component Value Date    HGB 11.6 (L) 07/27/2020    HCT 37.2 (L) 07/27/2020    MCV 93.1 07/27/2020     (L) 07/27/2020    WBC 3.60 07/27/2020    NEUTROABS 1.80 07/27/2020    LYMPHSABS 1.50 07/27/2020    MONOSABS 0.30 07/27/2020    EOSABS 0.13 05/19/2016    BASOSABS 0.05 05/19/2016       Lab Results   Component Value Date    GLUCOSE 108 (H) 07/27/2020    BUN 15 07/27/2020    CREATININE 1.30 (H) 07/27/2020     07/27/2020    K 4.2 07/27/2020     (H) 07/27/2020    CO2 29.0 07/27/2020    CALCIUM 9.3 07/27/2020    PROTEINTOT 7.3 07/27/2020    ALBUMIN 3.5 07/27/2020    BILITOT 0.5 07/27/2020    ALKPHOS 59 07/27/2020    AST 16 07/27/2020    ALT 14 07/27/2020                   ASSESSMENT & PLAN:    1.  Pancytopenia with combination of anemia renal disease, possible sequestration with the hepatomegaly and possible contribution from the gastric vascular ectasias on EGD  elevation of serum kappa and lambda light chains and gastric angios ectasias on 7/17/2020 EGD  2.  Chronic low back pain   3.  Chronic kidney disease  4.  Type 2 diabetes  5.  Hyperlipidemia  6.  Hypertension  7.  Hypogonadism  8.  Depression  9. COPD  10.  Obstructive sleep apnea  11.  Reflux   12.  Gout  13 osteoarthritis  14.  BPH with ED  15.  BMI 36  16.  Putative history of rheumatoid arthritis    Hematology history:  -Longstanding history of heme positive stools dating back to the  late 80s with at least 5 colonoscopies by Dr. Perez including the last 1/2018 as well as EGDs and a capsule endoscopy that apparently showed scattered blood that they could not do much about.In the [facility] system we have hemoglobins of 9.8 and platelet of 121,000 with white count 4880 as of June 2016 and December 2015 white count 3020 with platelets 126,000 hemoglobin 11.4.  Does have a history of heavy alcohol use but none in the past several years.  -8/7/2019 Hemoccult negative  -1/30/2020 reticulocyte count 1.4%.  B12 618.  Iron slightly low 48.  White count 2700 with hemoglobin 11.6 MCV 88 and normal red cell distribution width with platelets 124,000.  Normal thyroid functions.  -2/4/2020 white count 3100 hemoglobin 11.7 with normal MCV and red cell distribution with platelets 126,000 normal liver enzymes and bilirubin including fractionation.  Creatinine 1.36 GFR greater than 60 with normal calcium 9.1.  .  C-reactive protein 9.67 with sedimentation rate elevated at 40 as well.  Was started on iron with vitamin C.    -2/27/2020 initial [facility] hematology consultation: He had dark tarry stools predating the institution of his current iron and extensive prior endoscopic work-up as outlined above.  I will check his methylmalonic acid and homocystine along with repeat B12 and folate and with his elevated C-reactive protein and sedimentation rate will check a serum immunoelectrophoresis and light chains.  Given his prior drinking history despite the fact that his liver enzymes have been normal I strongly suspect portal hypertension and I suspect the GI bleeding may be related to this but we will get records from .  If we do not get answers from this then we will proceed with bone marrow biopsy.  If there is a monoclonal gammopathy we will also proceed with bone marrow biopsy.  With his putative history of rheumatoid arthritis he could have splenomegaly with Felty syndrome as well and I will check his ROSARIO and  rheumatoid factor.    -2/27/20 data:  Hgb 11.4 o/w normal CBC  Periph. Smear mild hypochromic anemia with mature WBC's    ZACKARY neg  Haptogb 213 high  Retic 2 %    Nl bili direct and indirect  Urine hemosiderin negative  Plasma free hemoglobin normal 7  G6PD 279 normal    ROSARIO neg  RF <10    Epo 22.4    Cr 1.6     nl  B12>2k  Homocysteine 10.5  Folate >20    Liver Spleen US:  Portal vein 1.7cm dilated  Smv Dilated 1.3 cm  But nl spleen size  Hepatomegaly with abnormal liver echogenicity.    Serum K 57.9, lambda 30.7, ratio 1.89.  Serum M spike zero    -5/26/2020 CT chest low-dose without contrast shows chronic granulomatous nodules lung RADS 2 with recommended annual follow-up.  Increased prominence of mucosal thickening distal esophagus compared to 2017 suggestive of esophagitis.    -7/17/2020 gastric angioma ectasias on EGD with Dr. Perez.  No esophageal abnormalities to corroborate with the above CT findings.    -7/27/2020 data:  White count 3600 with hemoglobin 11.6 platelets 137,000.  Creatinine 1.3 with GFR 65  Beta-2 microglobulin 3.2, upper limit 2.2  C-reactive protein 0.49/sedimentation rate 24  Serum immunoelectrophoresis showed no monoclonal spike  Serum free kappa light chains 52.8 with ratio of 2.25  Urine kappa light chains normal 41 with lambda normal 6 and ratio normal at 6  Bone survey essentially negative for myeloma save for a small minor posterior calvarial abnormality most likely venous lake.  Coincidental cardiomegaly with mild pulmonary vascular congestion.      -8/4/2020 data:   Sedimentation rate 37  White count 3100 hemoglobin 11.7 with platelets 115,000  Creatinine 1.27 with GFR greater than 60 and calcium 9.1 normal with normal total protein and albumin    -8/17/2020 hematology follow-up visit: Reviewed above data with patient.  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  Given persistence of monoclonal light chain even if the cranial abnormality is just a venous lake would still complete work-up with bone marrow biopsy to ensure no plasma cell dyscrasia, myelodysplasia, aplasia.  I will have [facility] visit with him in a few weeks to discuss these findings and if he has no myelodysplasia, plasma cell dyscrasia, achalasia, etc. then we have completed our hematologic evaluation and I would simply watch his myeloma panel periodically as he could have an MGUS but I suspect this is just a reactive mild elevation in his kappa light chains.  Discussed with patient face-to-face 30 minutes greater than 50% spent counseling      Yuval Herrera MD

## 2020-08-17 NOTE — PROGRESS NOTES
Telehealth follow-up visit: This was an audio and video enabled telemedicine encounter.  Verbal consent given.  For COVID 19 avoidance per CDC guidelines.  Discussed with patient face-to-face 30 minutes greater than 50% spent counseling    CHIEF COMPLAINT: Pancytopenia with kappa light chains    Problem List:  Oncology/Hematology History    1.  Pancytopenia with combination of anemia renal disease, possible sequestration with the hepatomegaly and possible contribution from the gastric vascular ectasias on EGD  elevation of serum kappa and lambda light chains and gastric angios ectasias on 7/17/2020 EGD  2.  Chronic low back pain   3.  Chronic kidney disease  4.  Type 2 diabetes  5.  Hyperlipidemia  6.  Hypertension  7.  Hypogonadism  8.  Depression  9. COPD  10.  Obstructive sleep apnea  11.  Reflux   12.  Gout  13 osteoarthritis  14.  BPH with ED  15.  BMI 36  16.  Putative history of rheumatoid arthritis    Hematology history:  -Longstanding history of heme positive stools dating back to the late 80s with at least 5 colonoscopies by Dr. Perez including the last 1/2018 as well as EGDs and a capsule endoscopy that apparently showed scattered blood that they could not do much about.In the [facility] system we have hemoglobins of 9.8 and platelet of 121,000 with white count 4880 as of June 2016 and December 2015 white count 3020 with platelets 126,000 hemoglobin 11.4.  Does have a history of heavy alcohol use but none in the past several years.  -8/7/2019 Hemoccult negative  -1/30/2020 reticulocyte count 1.4%.  B12 618.  Iron slightly low 48.  White count 2700 with hemoglobin 11.6 MCV 88 and normal red cell distribution width with platelets 124,000.  Normal thyroid functions.  -2/4/2020 white count 3100 hemoglobin 11.7 with normal MCV and red cell distribution with platelets 126,000 normal liver enzymes and bilirubin including fractionation.  Creatinine 1.36 GFR greater than 60 with normal calcium 9.1.  .  C-reactive  protein 9.67 with sedimentation rate elevated at 40 as well.  Was started on iron with vitamin C.    -2/27/2020 initial [facility] hematology consultation: He had dark tarry stools predating the institution of his current iron and extensive prior endoscopic work-up as outlined above.  I will check his methylmalonic acid and homocystine along with repeat B12 and folate and with his elevated C-reactive protein and sedimentation rate will check a serum immunoelectrophoresis and light chains.  Given his prior drinking history despite the fact that his liver enzymes have been normal I strongly suspect portal hypertension and I suspect the GI bleeding may be related to this but we will get records from .  If we do not get answers from this then we will proceed with bone marrow biopsy.  If there is a monoclonal gammopathy we will also proceed with bone marrow biopsy.  With his putative history of rheumatoid arthritis he could have splenomegaly with Felty syndrome as well and I will check his ROSARIO and rheumatoid factor.    -2/27/20 data:  Hgb 11.4 o/w normal CBC  Periph. Smear mild hypochromic anemia with mature WBC's    ZACKARY neg  Haptogb 213 high  Retic 2 %    Nl bili direct and indirect  Urine hemosiderin negative  Plasma free hemoglobin normal 7  G6PD 279 normal    ROSARIO neg  RF <10    Epo 22.4    Cr 1.6     nl  B12>2k  Homocysteine 10.5  Folate >20    Liver Spleen US:  Portal vein 1.7cm dilated  Smv Dilated 1.3 cm  But nl spleen size  Hepatomegaly with abnormal liver echogenicity.    Serum K 57.9, lambda 30.7, ratio 1.89.  Serum M spike zero    -5/26/2020 CT chest low-dose without contrast shows chronic granulomatous nodules lung RADS 2 with recommended annual follow-up.  Increased prominence of mucosal thickening distal esophagus compared to 2017 suggestive of esophagitis.    -7/17/2020 gastric angioma ectasias on EGD with Dr. Perez.  No esophageal abnormalities to corroborate with the above CT  findings.    -7/27/2020 data:  White count 3600 with hemoglobin 11.6 platelets 137,000.  Creatinine 1.3 with GFR 65  Beta-2 microglobulin 3.2, upper limit 2.2  C-reactive protein 0.49/sedimentation rate 24  Serum immunoelectrophoresis showed no monoclonal spike  Serum free kappa light chains 52.8 with ratio of 2.25  Urine kappa light chains normal 41 with lambda normal 6 and ratio normal at 6  Bone survey essentially negative for myeloma save for a small minor posterior calvarial abnormality most likely venous lake.  Coincidental cardiomegaly with mild pulmonary vascular congestion.      -8/4/2020 data:   Sedimentation rate 37  White count 3100 hemoglobin 11.7 with platelets 115,000  Creatinine 1.27 with GFR greater than 60 and calcium 9.1 normal with normal total protein and albumin    -8/17/2020 hematology follow-up visit: Reviewed above data with patient.  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  Given persistence of monoclonal light chain even if the cranial abnormality is just a venous lake would still complete work-up with bone marrow biopsy to ensure no plasma cell dyscrasia, myelodysplasia, aplasia.                        Pancytopenia (CMS/HCC)    2/26/2020 Initial Diagnosis     Pancytopenia (CMS/HCC)         HISTORY OF PRESENT ILLNESS:  The patient is a 75 y.o. male, here for follow up on management of pancytopenia with kappa light chains.      Past Medical History:   Diagnosis Date   • Arthritis    • Asthma    • Bowel trouble    • Chondrocalcinosis of right knee    • Colitis    • COPD (chronic obstructive pulmonary disease) (CMS/HCC)    • Diabetes mellitus (CMS/HCC)    • Esophagitis    • Gout    • H/O bladder problems    • Heart murmur    • Hypertension    • IBS (irritable bowel syndrome)     • JONAH on CPAP    • Primary osteoarthritis of both knees    • Rheumatoid arthritis (CMS/HCC)    • Skin problem    • SOB (shortness of breath)      Past Surgical History:   Procedure Laterality Date   • COLONOSCOPY     • KNEE ARTHROSCOPY Left    • PARTIAL KNEE ARTHROPLASTY Left    • SKIN BIOPSY     • STOMACH SURGERY     • VASECTOMY         Allergies   Allergen Reactions   • Lisinopril Swelling   • Benazepril Swelling       Family History and Social History reviewed and changed as necessary      REVIEW OF SYSTEM:   Review of Systems   Constitutional: Negative for appetite change, chills, diaphoresis, fatigue, fever and unexpected weight change.   HENT:   Negative for mouth sores, sore throat and trouble swallowing.    Eyes: Negative for icterus.   Respiratory: Negative for cough, hemoptysis and shortness of breath.    Cardiovascular: Negative for chest pain, leg swelling and palpitations.   Gastrointestinal: Negative for abdominal distention, abdominal pain, blood in stool, constipation, diarrhea, nausea and vomiting.   Endocrine: Negative for hot flashes.   Genitourinary: Negative for bladder incontinence, difficulty urinating, dysuria, frequency and hematuria.    Musculoskeletal: Negative for gait problem, neck pain and neck stiffness.   Skin: Negative for rash.   Neurological: Negative for dizziness, gait problem, headaches, light-headedness and numbness.   Hematological: Negative for adenopathy. Does not bruise/bleed easily.   Psychiatric/Behavioral: Negative for depression. The patient is not nervous/anxious.    All other systems reviewed and are negative.       PHYSICAL EXAM    There were no vitals filed for this visit.  Vitals:    08/17/20 0904   PainSc: 0-No pain        Constitutional: Appears well-developed and well-nourished. No distress.   ECOG: (0) Fully Active - Able to Carry On All Pre-disease Performance Without Restriction  HENT:   Head: Normocephalic.   Mouth/Throat: Oropharynx is clear and moist.    Eyes: Conjunctivae are normal.  No scleral icterus.   Neck:  . No visible thyromegaly present.   Cardiovascular: No JVD visible  Pulmonary/Chest: No visible respiratory distress.   Abdominal: Soft. Exhibits no visible distension.    Musculoskeletal:Exhibits no edema, tenderness or deformity.   Neurological: Alert and oriented to person, place, and time.  Moving all extremities  Skin: No ecchymosis, no petechiae and no rash noted. Not diaphoretic. No cyanosis. Nails show no clubbing.   Psychiatric: Normal mood and affect.   Vitals reviewed.      Lab Results   Component Value Date    HGB 11.6 (L) 07/27/2020    HCT 37.2 (L) 07/27/2020    MCV 93.1 07/27/2020     (L) 07/27/2020    WBC 3.60 07/27/2020    NEUTROABS 1.80 07/27/2020    LYMPHSABS 1.50 07/27/2020    MONOSABS 0.30 07/27/2020    EOSABS 0.13 05/19/2016    BASOSABS 0.05 05/19/2016       Lab Results   Component Value Date    GLUCOSE 108 (H) 07/27/2020    BUN 15 07/27/2020    CREATININE 1.30 (H) 07/27/2020     07/27/2020    K 4.2 07/27/2020     (H) 07/27/2020    CO2 29.0 07/27/2020    CALCIUM 9.3 07/27/2020    PROTEINTOT 7.3 07/27/2020    ALBUMIN 3.5 07/27/2020    BILITOT 0.5 07/27/2020    ALKPHOS 59 07/27/2020    AST 16 07/27/2020    ALT 14 07/27/2020                   ASSESSMENT & PLAN:    1.  Pancytopenia with combination of anemia renal disease, possible sequestration with the hepatomegaly and possible contribution from the gastric vascular ectasias on EGD  elevation of serum kappa and lambda light chains and gastric angios ectasias on 7/17/2020 EGD  2.  Chronic low back pain   3.  Chronic kidney disease  4.  Type 2 diabetes  5.  Hyperlipidemia  6.  Hypertension  7.  Hypogonadism  8.  Depression  9. COPD  10.  Obstructive sleep apnea  11.  Reflux   12.  Gout  13 osteoarthritis  14.  BPH with ED  15.  BMI 36  16.  Putative history of rheumatoid arthritis    Hematology history:  -Longstanding history of heme positive stools dating back to the  late 80s with at least 5 colonoscopies by Dr. Perez including the last 1/2018 as well as EGDs and a capsule endoscopy that apparently showed scattered blood that they could not do much about.In the [facility] system we have hemoglobins of 9.8 and platelet of 121,000 with white count 4880 as of June 2016 and December 2015 white count 3020 with platelets 126,000 hemoglobin 11.4.  Does have a history of heavy alcohol use but none in the past several years.  -8/7/2019 Hemoccult negative  -1/30/2020 reticulocyte count 1.4%.  B12 618.  Iron slightly low 48.  White count 2700 with hemoglobin 11.6 MCV 88 and normal red cell distribution width with platelets 124,000.  Normal thyroid functions.  -2/4/2020 white count 3100 hemoglobin 11.7 with normal MCV and red cell distribution with platelets 126,000 normal liver enzymes and bilirubin including fractionation.  Creatinine 1.36 GFR greater than 60 with normal calcium 9.1.  .  C-reactive protein 9.67 with sedimentation rate elevated at 40 as well.  Was started on iron with vitamin C.    -2/27/2020 initial [facility] hematology consultation: He had dark tarry stools predating the institution of his current iron and extensive prior endoscopic work-up as outlined above.  I will check his methylmalonic acid and homocystine along with repeat B12 and folate and with his elevated C-reactive protein and sedimentation rate will check a serum immunoelectrophoresis and light chains.  Given his prior drinking history despite the fact that his liver enzymes have been normal I strongly suspect portal hypertension and I suspect the GI bleeding may be related to this but we will get records from .  If we do not get answers from this then we will proceed with bone marrow biopsy.  If there is a monoclonal gammopathy we will also proceed with bone marrow biopsy.  With his putative history of rheumatoid arthritis he could have splenomegaly with Felty syndrome as well and I will check his ROSARIO and  rheumatoid factor.    -2/27/20 data:  Hgb 11.4 o/w normal CBC  Periph. Smear mild hypochromic anemia with mature WBC's    ZACKARY neg  Haptogb 213 high  Retic 2 %    Nl bili direct and indirect  Urine hemosiderin negative  Plasma free hemoglobin normal 7  G6PD 279 normal    ROSARIO neg  RF <10    Epo 22.4    Cr 1.6     nl  B12>2k  Homocysteine 10.5  Folate >20    Liver Spleen US:  Portal vein 1.7cm dilated  Smv Dilated 1.3 cm  But nl spleen size  Hepatomegaly with abnormal liver echogenicity.    Serum K 57.9, lambda 30.7, ratio 1.89.  Serum M spike zero    -5/26/2020 CT chest low-dose without contrast shows chronic granulomatous nodules lung RADS 2 with recommended annual follow-up.  Increased prominence of mucosal thickening distal esophagus compared to 2017 suggestive of esophagitis.    -7/17/2020 gastric angioma ectasias on EGD with Dr. Perez.  No esophageal abnormalities to corroborate with the above CT findings.    -7/27/2020 data:  White count 3600 with hemoglobin 11.6 platelets 137,000.  Creatinine 1.3 with GFR 65  Beta-2 microglobulin 3.2, upper limit 2.2  C-reactive protein 0.49/sedimentation rate 24  Serum immunoelectrophoresis showed no monoclonal spike  Serum free kappa light chains 52.8 with ratio of 2.25  Urine kappa light chains normal 41 with lambda normal 6 and ratio normal at 6  Bone survey essentially negative for myeloma save for a small minor posterior calvarial abnormality most likely venous lake.  Coincidental cardiomegaly with mild pulmonary vascular congestion.      -8/4/2020 data:   Sedimentation rate 37  White count 3100 hemoglobin 11.7 with platelets 115,000  Creatinine 1.27 with GFR greater than 60 and calcium 9.1 normal with normal total protein and albumin    -8/17/2020 hematology follow-up visit: Reviewed above data with patient.  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  Given persistence of monoclonal light chain even if the cranial abnormality is just a venous lake would still complete work-up with bone marrow biopsy to ensure no plasma cell dyscrasia, myelodysplasia, aplasia.  I will have [facility] visit with him in a few weeks to discuss these findings and if he has no myelodysplasia, plasma cell dyscrasia, achalasia, etc. then we have completed our hematologic evaluation and I would simply watch his myeloma panel periodically as he could have an MGUS but I suspect this is just a reactive mild elevation in his kappa light chains.  Discussed with patient face-to-face 30 minutes greater than 50% spent counseling      Yuval Herrera MD

## 2020-08-18 LAB
REF LAB TEST METHOD: NORMAL
SARS-COV-2 RNA RESP QL NAA+PROBE: NOT DETECTED

## 2020-08-20 ENCOUNTER — HOSPITAL ENCOUNTER (OUTPATIENT)
Dept: CT IMAGING | Facility: HOSPITAL | Age: 75
Discharge: HOME | End: 2020-08-20
Admitting: INTERNAL MEDICINE

## 2020-08-20 VITALS
WEIGHT: 236.8 LBS | SYSTOLIC BLOOD PRESSURE: 128 MMHG | BODY MASS INDEX: 35.07 KG/M2 | TEMPERATURE: 98.2 F | HEIGHT: 69 IN | DIASTOLIC BLOOD PRESSURE: 77 MMHG | RESPIRATION RATE: 20 BRPM | OXYGEN SATURATION: 96 % | HEART RATE: 64 BPM

## 2020-08-20 DIAGNOSIS — D61.818 PANCYTOPENIA (HCC): ICD-10-CM

## 2020-08-20 LAB
APTT PPP: 33.1 SECONDS (ref 24–37)
BASOPHILS # BLD AUTO: 0.05 10*3/MM3 (ref 0–0.2)
BASOPHILS NFR BLD AUTO: 1.5 % (ref 0–1.5)
DEPRECATED RDW RBC AUTO: 37.8 FL (ref 37–54)
EOSINOPHIL # BLD AUTO: 0.16 10*3/MM3 (ref 0–0.4)
EOSINOPHIL NFR BLD AUTO: 4.7 % (ref 0.3–6.2)
ERYTHROCYTE [DISTWIDTH] IN BLOOD BY AUTOMATED COUNT: 11.9 % (ref 12.3–15.4)
HCT VFR BLD AUTO: 33.4 % (ref 37.5–51)
HGB BLD-MCNC: 11.3 G/DL (ref 13–17.7)
IMM GRANULOCYTES # BLD AUTO: 0.01 10*3/MM3 (ref 0–0.05)
IMM GRANULOCYTES NFR BLD AUTO: 0.3 % (ref 0–0.5)
INR PPP: 1.23 (ref 0.85–1.16)
LYMPHOCYTES # BLD AUTO: 1.18 10*3/MM3 (ref 0.7–3.1)
LYMPHOCYTES NFR BLD AUTO: 34.7 % (ref 19.6–45.3)
MCH RBC QN AUTO: 30.1 PG (ref 26.6–33)
MCHC RBC AUTO-ENTMCNC: 33.8 G/DL (ref 31.5–35.7)
MCV RBC AUTO: 89.1 FL (ref 79–97)
MONOCYTES # BLD AUTO: 0.4 10*3/MM3 (ref 0.1–0.9)
MONOCYTES NFR BLD AUTO: 11.8 % (ref 5–12)
NEUTROPHILS NFR BLD AUTO: 1.6 10*3/MM3 (ref 1.7–7)
NEUTROPHILS NFR BLD AUTO: 47 % (ref 42.7–76)
NRBC BLD AUTO-RTO: 0 /100 WBC (ref 0–0.2)
PLATELET # BLD AUTO: 123 10*3/MM3 (ref 140–450)
PMV BLD AUTO: 11.1 FL (ref 6–12)
PROTHROMBIN TIME: 15.2 SECONDS (ref 11.5–14)
RBC # BLD AUTO: 3.75 10*6/MM3 (ref 4.14–5.8)
WBC # BLD AUTO: 3.4 10*3/MM3 (ref 3.4–10.8)

## 2020-08-20 PROCEDURE — 85097 BONE MARROW INTERPRETATION: CPT | Performed by: INTERNAL MEDICINE

## 2020-08-20 PROCEDURE — 85025 COMPLETE CBC W/AUTO DIFF WBC: CPT | Performed by: RADIOLOGY

## 2020-08-20 PROCEDURE — 88184 FLOWCYTOMETRY/ TC 1 MARKER: CPT

## 2020-08-20 PROCEDURE — 88313 SPECIAL STAINS GROUP 2: CPT | Performed by: INTERNAL MEDICINE

## 2020-08-20 PROCEDURE — 88342 IMHCHEM/IMCYTCHM 1ST ANTB: CPT | Performed by: INTERNAL MEDICINE

## 2020-08-20 PROCEDURE — 88365 INSITU HYBRIDIZATION (FISH): CPT | Performed by: INTERNAL MEDICINE

## 2020-08-20 PROCEDURE — 88264 CHROMOSOME ANALYSIS 20-25: CPT

## 2020-08-20 PROCEDURE — 77012 CT SCAN FOR NEEDLE BIOPSY: CPT

## 2020-08-20 PROCEDURE — 88311 DECALCIFY TISSUE: CPT | Performed by: INTERNAL MEDICINE

## 2020-08-20 PROCEDURE — 88237 TISSUE CULTURE BONE MARROW: CPT

## 2020-08-20 PROCEDURE — 85730 THROMBOPLASTIN TIME PARTIAL: CPT | Performed by: RADIOLOGY

## 2020-08-20 PROCEDURE — 25010000002 FENTANYL CITRATE (PF) 100 MCG/2ML SOLUTION: Performed by: RADIOLOGY

## 2020-08-20 PROCEDURE — 88364 INSITU HYBRIDIZATION (FISH): CPT | Performed by: INTERNAL MEDICINE

## 2020-08-20 PROCEDURE — 25010000002 MIDAZOLAM PER 1 MG: Performed by: RADIOLOGY

## 2020-08-20 PROCEDURE — 88185 FLOWCYTOMETRY/TC ADD-ON: CPT

## 2020-08-20 PROCEDURE — 88305 TISSUE EXAM BY PATHOLOGIST: CPT | Performed by: INTERNAL MEDICINE

## 2020-08-20 PROCEDURE — 85610 PROTHROMBIN TIME: CPT | Performed by: RADIOLOGY

## 2020-08-20 RX ORDER — MIDAZOLAM HYDROCHLORIDE 1 MG/ML
INJECTION INTRAMUSCULAR; INTRAVENOUS
Status: DISPENSED
Start: 2020-08-20 | End: 2020-08-20

## 2020-08-20 RX ORDER — FENTANYL CITRATE 50 UG/ML
INJECTION, SOLUTION INTRAMUSCULAR; INTRAVENOUS
Status: DISPENSED
Start: 2020-08-20 | End: 2020-08-20

## 2020-08-20 RX ORDER — FENTANYL CITRATE 50 UG/ML
INJECTION, SOLUTION INTRAMUSCULAR; INTRAVENOUS
Status: COMPLETED | OUTPATIENT
Start: 2020-08-20 | End: 2020-08-20

## 2020-08-20 RX ORDER — LIDOCAINE HYDROCHLORIDE 10 MG/ML
20 INJECTION, SOLUTION EPIDURAL; INFILTRATION; INTRACAUDAL; PERINEURAL ONCE
Status: COMPLETED | OUTPATIENT
Start: 2020-08-20 | End: 2020-08-20

## 2020-08-20 RX ORDER — MIDAZOLAM HYDROCHLORIDE 1 MG/ML
INJECTION INTRAMUSCULAR; INTRAVENOUS
Status: COMPLETED | OUTPATIENT
Start: 2020-08-20 | End: 2020-08-20

## 2020-08-20 RX ADMIN — LIDOCAINE HYDROCHLORIDE 20 ML: 10 INJECTION, SOLUTION EPIDURAL; INFILTRATION; INTRACAUDAL; PERINEURAL at 09:22

## 2020-08-20 RX ADMIN — FENTANYL CITRATE 50 MCG: 50 INJECTION, SOLUTION INTRAMUSCULAR; INTRAVENOUS at 09:23

## 2020-08-20 RX ADMIN — FENTANYL CITRATE 50 MCG: 50 INJECTION, SOLUTION INTRAMUSCULAR; INTRAVENOUS at 09:26

## 2020-08-20 RX ADMIN — MIDAZOLAM 1 MG: 1 INJECTION INTRAMUSCULAR; INTRAVENOUS at 09:26

## 2020-08-20 RX ADMIN — MIDAZOLAM 1 MG: 1 INJECTION INTRAMUSCULAR; INTRAVENOUS at 09:29

## 2020-08-20 RX ADMIN — MIDAZOLAM 1 MG: 1 INJECTION INTRAMUSCULAR; INTRAVENOUS at 09:23

## 2020-08-20 NOTE — POST-PROCEDURE NOTE
Interventional Radiology Operative Note    Date: 08/20/20     Time: 09:42     Pre-op Diagnosis: Pancytopenia with combination of anemia renal disease, possible sequestration with the hepatomegaly and possible contribution from the gastric vascular ectasias on EGD  elevation of serum kappa and lambda light chains and gastric angios ectasias  Post-op Diagnosis: Same    Procedure: CT guided bone marrow biopsy    Surgeon: ALVARO Fraser M.D.  Assistants: None    Sedation: Moderate sedation    Estimated Blood Loss (EBL): Trace     Urine Output (UOP): N/A (short procedure)    IVF: N/A (short procedure)    Findings: Intact right posterior iliac crest    Specimens: 11 gauge core. 12 mL bone marrow    Complications: No immediate    Disposition: Recovery. Stable

## 2020-08-21 ENCOUNTER — TELEPHONE (OUTPATIENT)
Dept: INFUSION THERAPY | Facility: HOSPITAL | Age: 75
End: 2020-08-21

## 2020-08-31 LAB
LAB AP ASPIRATE SMEAR: NORMAL
LAB AP CASE REPORT: NORMAL
LAB AP CBC AND DIFFERENTIAL: NORMAL
LAB AP CLINICAL INFORMATION: NORMAL
LAB AP CLOT SECTION: NORMAL
LAB AP CORE BIOPSY: NORMAL
LAB AP CYTOGENETICS REPORT,ADDENDUM: NORMAL
LAB AP DIAGNOSIS COMMENT: NORMAL
LAB AP FLOW CYTOMETRY SUMMARY: NORMAL
PATH REPORT.FINAL DX SPEC: NORMAL
PATH REPORT.GROSS SPEC: NORMAL

## 2020-09-03 ENCOUNTER — OFFICE VISIT (OUTPATIENT)
Dept: ONCOLOGY | Facility: CLINIC | Age: 75
End: 2020-09-03

## 2020-09-03 VITALS — HEIGHT: 69 IN | BODY MASS INDEX: 34.97 KG/M2

## 2020-09-03 DIAGNOSIS — D61.818 PANCYTOPENIA (HCC): Primary | ICD-10-CM

## 2020-09-03 PROCEDURE — 99213 OFFICE O/P EST LOW 20 MIN: CPT | Performed by: INTERNAL MEDICINE

## 2020-09-03 RX ORDER — COLCHICINE 0.6 MG/1
0.6 TABLET, FILM COATED ORAL AS NEEDED
COMMUNITY
Start: 2020-08-04

## 2020-09-03 NOTE — PROGRESS NOTES
Telehealth follow-up visit: This visit has been rescheduled as a phone visit to comply with patient safety concerns in accordance with CDC recommendations. Total time of discussion was 15 minutes.  Verbal consent given      CHIEF COMPLAINT: Pancytopenia    Problem List:  Oncology/Hematology History    1.  Pancytopenia with combination of anemia renal disease, possible sequestration with the hepatomegaly/portal vein dilation/SMV dilation on February 2020 liver spleen ultrasound suggesting portal hypertension and possible contribution from the gastric vascular ectasias on EGD  elevation of serum kappa and lambda light chains and gastric angioectasias on 7/17/2020 EGD  2.  Chronic low back pain   3.  Chronic kidney disease  4.  Type 2 diabetes  5.  Hyperlipidemia  6.  Hypertension  7.  Hypogonadism  8.  Depression  9. COPD  10.  Obstructive sleep apnea  11.  Reflux   12.  Gout  13 osteoarthritis  14.  BPH with ED  15.  BMI 36  16.  Putative history of rheumatoid arthritis  17.  Chronic granulomatous lung nodules on CT chest screening with routine follow-up recommended  18.  Scant elevation of serum light chains without intact monoclonal gammopathy and no plasma cell dyscrasia on bone marrow biopsy August 2020    Hematology history:  -Longstanding history of heme positive stools dating back to the late 80s with at least 5 colonoscopies by Dr. Perez including the last 1/2018 as well as EGDs and a capsule endoscopy that apparently showed scattered blood that they could not do much about.In the [facility] system we have hemoglobins of 9.8 and platelet of 121,000 with white count 4880 as of June 2016 and December 2015 white count 3020 with platelets 126,000 hemoglobin 11.4.  Does have a history of heavy alcohol use but none in the past several years.  -8/7/2019 Hemoccult negative  -1/30/2020 reticulocyte count 1.4%.  B12 618.  Iron slightly low 48.  White count 2700 with hemoglobin 11.6 MCV 88 and normal red cell distribution  width with platelets 124,000.  Normal thyroid functions.  -2/4/2020 white count 3100 hemoglobin 11.7 with normal MCV and red cell distribution with platelets 126,000 normal liver enzymes and bilirubin including fractionation.  Creatinine 1.36 GFR greater than 60 with normal calcium 9.1.  .  C-reactive protein 9.67 with sedimentation rate elevated at 40 as well.  Was started on iron with vitamin C.    -2/27/2020 initial [facility] consultation: He had dark tarry stools predating the institution of his current iron and extensive prior endoscopic work-up as outlined above.  I will check his methylmalonic acid and homocystine along with repeat B12 and folate and with his elevated C-reactive protein and sedimentation rate will check a serum immunoelectrophoresis and light chains.  Given his prior drinking history despite the fact that his liver enzymes have been normal I strongly suspect portal hypertension and I suspect the GI bleeding may be related to this but we will get records from .  If we do not get answers from this then we will proceed with bone marrow biopsy.  If there is a monoclonal gammopathy we will also proceed with bone marrow biopsy.  With his putative history of rheumatoid arthritis he could have splenomegaly with Felty syndrome as well and I will check his ROSARIO and rheumatoid factor.    -2/27/20 data:  Hgb 11.4 o/w normal CBC  Periph. Smear mild hypochromic anemia with mature WBC's    ZACKARY neg  Haptogb 213 high  Retic 2 %    Nl bili direct and indirect  Urine hemosiderin negative  Plasma free hemoglobin normal 7  G6PD 279 normal    ROSARIO neg  RF <10    Epo 22.4    Cr 1.6     nl  B12>2k  Homocysteine 10.5  Folate >20    Liver Spleen US:  Portal vein 1.7cm dilated  Smv Dilated 1.3 cm  But nl spleen size  Hepatomegaly with abnormal liver echogenicity.    Serum K 57.9, lambda 30.7, ratio 1.89.  Serum M spike zero    -5/26/2020 CT chest low-dose without contrast shows chronic  granulomatous nodules lung RADS 2 with recommended annual follow-up.  Increased prominence of mucosal thickening distal esophagus compared to 2017 suggestive of esophagitis.    -7/17/2020 gastric angioma ectasias on EGD with Dr. Perez.  No esophageal abnormalities to corroborate with the above CT findings.    -7/27/2020 data:  White count 3600 with hemoglobin 11.6 platelets 137,000.  Creatinine 1.3 with GFR 65  Beta-2 microglobulin 3.2, upper limit 2.2  C-reactive protein 0.49/sedimentation rate 24  Serum immunoelectrophoresis showed no monoclonal spike  Serum free kappa light chains 52.8 with ratio of 2.25  Urine kappa light chains normal 41 with lambda normal 6 and ratio normal at 6  Bone survey essentially negative for myeloma save for a small minor posterior calvarial abnormality most likely venous lake.  Coincidental cardiomegaly with mild pulmonary vascular congestion.      -8/4/2020 data:   Sedimentation rate 37  White count 3100 hemoglobin 11.7 with platelets 115,000  Creatinine 1.27 with GFR greater than 60 and calcium 9.1 normal with normal total protein and albumin    -8/17/2020 hematology follow-up visit: Reviewed above data with patient.  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  Given persistence of monoclonal light chain with mild renal dysfunction even if the cranial abnormality is just a venous lake, I would still complete work-up with bone marrow biopsy to ensure no plasma cell dyscrasia, myelodysplasia, aplasia.    -8/20/2020 bone marrow biopsy showed mild hypercellularity 33% with out atypia.  No increase in plasma cells.  No T-cell or B-cell clonality on flow.  Normal cytogenetics.  Hemoglobin 11.3 with platelets 123,000.  Hence low likelihood for plasma cell dyscrasia or  myelodysplasia.    -9/3/2020 hematology follow-up visit: Reviewed results of bone marrow biopsy.  No hint of myelodysplasia or plasma cell dyscrasia or myelophthisic process.  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                        Pancytopenia (CMS/HCC)    2/26/2020 Initial Diagnosis     Pancytopenia (CMS/HCC)         HISTORY OF PRESENT ILLNESS:  The patient is a 75 y.o. male, here for follow up on management of pancytopenia.  Status post bone marrow biopsy      Past Medical History:   Diagnosis Date   • Arthritis    • Asthma    • Bowel trouble    • Chondrocalcinosis of right knee    • Colitis    • COPD (chronic obstructive pulmonary disease) (CMS/HCC)    • Diabetes mellitus (CMS/HCC)    • Esophagitis    • Gout    • H/O bladder problems    • Heart murmur    • Hypertension    • IBS (irritable bowel syndrome)    • JONAH on CPAP    • Primary osteoarthritis of both knees    • Rheumatoid arthritis (CMS/HCC)    • Skin problem    • SOB (shortness of breath)      Past Surgical History:   Procedure Laterality Date   • COLONOSCOPY     • KNEE ARTHROSCOPY Left    • PARTIAL KNEE ARTHROPLASTY Left    • SKIN BIOPSY     • STOMACH SURGERY     • VASECTOMY         Allergies   Allergen Reactions   • Lisinopril Swelling   • Benazepril Swelling       Family History and Social History reviewed and changed as necessary      REVIEW OF SYSTEM:   Review of Systems   Constitutional: Negative for appetite change, chills, diaphoresis, fatigue, fever and unexpected weight change.   HENT:   Negative for mouth sores, sore throat and trouble swallowing.    Eyes: Negative for icterus.   Respiratory: Negative for cough,  "hemoptysis and shortness of breath.    Cardiovascular: Negative for chest pain, leg swelling and palpitations.   Gastrointestinal: Negative for abdominal distention, abdominal pain, blood in stool, constipation, diarrhea, nausea and vomiting.   Endocrine: Negative for hot flashes.   Genitourinary: Negative for bladder incontinence, difficulty urinating, dysuria, frequency and hematuria.    Musculoskeletal: Negative for gait problem, neck pain and neck stiffness.   Skin: Negative for rash.   Neurological: Negative for dizziness, gait problem, headaches, light-headedness and numbness.   Hematological: Negative for adenopathy. Does not bruise/bleed easily.   Psychiatric/Behavioral: Negative for depression. The patient is not nervous/anxious.    All other systems reviewed and are negative.       PHYSICAL EXAM    Vitals:    09/03/20 1118   Height: 175.3 cm (69\")     Vitals:    09/03/20 1118   PainSc: 0-No pain        Constitutional: Appears well-developed and well-nourished. No distress.   ECOG: (1) Restricted in Physically Strenuous Activity, Ambulatory & Able to Do Work of Light Nature  HENT:   Head: Normocephalic.   Mouth/Throat: Oropharynx is clear and moist.   Eyes: Conjunctivae are normal.  No scleral icterus.   Neck:  . No visible thyromegaly present.   Cardiovascular: No JVD visible  Pulmonary/Chest: No visible respiratory distress.   Abdominal: Soft. Exhibits no visible distension.    Musculoskeletal:Exhibits no edema, tenderness or deformity.   Neurological: Alert and oriented to person, place, and time.  Moving all extremities  Skin: No ecchymosis, no petechiae and no rash noted. Not diaphoretic. No cyanosis. Nails show no clubbing.   Psychiatric: Normal mood and affect.   Vitals reviewed.      Lab Results   Component Value Date    HGB 11.3 (L) 08/20/2020    HCT 33.4 (L) 08/20/2020    MCV 89.1 08/20/2020     (L) 08/20/2020    WBC 3.40 08/20/2020    NEUTROABS 1.60 (L) 08/20/2020    LYMPHSABS 1.18 " 08/20/2020    MONOSABS 0.40 08/20/2020    EOSABS 0.16 08/20/2020    BASOSABS 0.05 08/20/2020       Lab Results   Component Value Date    GLUCOSE 108 (H) 07/27/2020    BUN 15 07/27/2020    CREATININE 1.30 (H) 07/27/2020     07/27/2020    K 4.2 07/27/2020     (H) 07/27/2020    CO2 29.0 07/27/2020    CALCIUM 9.3 07/27/2020    PROTEINTOT 7.3 07/27/2020    ALBUMIN 3.5 07/27/2020    BILITOT 0.5 07/27/2020    ALKPHOS 59 07/27/2020    AST 16 07/27/2020    ALT 14 07/27/2020                   ASSESSMENT & PLAN:  1.  Pancytopenia with combination of anemia renal disease, possible sequestration with the hepatomegaly/portal vein dilation/SMV dilation on February 2020 liver spleen ultrasound suggesting portal hypertension and possible contribution from the gastric vascular ectasias on EGD  elevation of serum kappa and lambda light chains and gastric angioectasias on 7/17/2020 EGD  2.  Chronic low back pain   3.  Chronic kidney disease  4.  Type 2 diabetes  5.  Hyperlipidemia  6.  Hypertension  7.  Hypogonadism  8.  Depression  9. COPD  10.  Obstructive sleep apnea  11.  Reflux   12.  Gout  13 osteoarthritis  14.  BPH with ED  15.  BMI 36  16.  Putative history of rheumatoid arthritis  17.  Chronic granulomatous lung nodules on CT chest screening with routine follow-up recommended  18.  Scant elevation of serum light chains without intact monoclonal gammopathy and no plasma cell dyscrasia on bone marrow biopsy August 2020    Hematology history:  -Longstanding history of heme positive stools dating back to the late 80s with at least 5 colonoscopies by Dr. Perez including the last 1/2018 as well as EGDs and a capsule endoscopy that apparently showed scattered blood that they could not do much about.In the [facility] system we have hemoglobins of 9.8 and platelet of 121,000 with white count 4880 as of June 2016 and December 2015 white count 3020 with platelets 126,000 hemoglobin 11.4.  Does have a history of heavy alcohol  use but none in the past several years.  -8/7/2019 Hemoccult negative  -1/30/2020 reticulocyte count 1.4%.  B12 618.  Iron slightly low 48.  White count 2700 with hemoglobin 11.6 MCV 88 and normal red cell distribution width with platelets 124,000.  Normal thyroid functions.  -2/4/2020 white count 3100 hemoglobin 11.7 with normal MCV and red cell distribution with platelets 126,000 normal liver enzymes and bilirubin including fractionation.  Creatinine 1.36 GFR greater than 60 with normal calcium 9.1.  .  C-reactive protein 9.67 with sedimentation rate elevated at 40 as well.  Was started on iron with vitamin C.    -2/27/2020 initial [facility] consultation: He had dark tarry stools predating the institution of his current iron and extensive prior endoscopic work-up as outlined above.  I will check his methylmalonic acid and homocystine along with repeat B12 and folate and with his elevated C-reactive protein and sedimentation rate will check a serum immunoelectrophoresis and light chains.  Given his prior drinking history despite the fact that his liver enzymes have been normal I strongly suspect portal hypertension and I suspect the GI bleeding may be related to this but we will get records from .  If we do not get answers from this then we will proceed with bone marrow biopsy.  If there is a monoclonal gammopathy we will also proceed with bone marrow biopsy.  With his putative history of rheumatoid arthritis he could have splenomegaly with Felty syndrome as well and I will check his ROSARIO and rheumatoid factor.    -2/27/20 data:  Hgb 11.4 o/w normal CBC  Periph. Smear mild hypochromic anemia with mature WBC's    ZACKARY neg  Haptogb 213 high  Retic 2 %    Nl bili direct and indirect  Urine hemosiderin negative  Plasma free hemoglobin normal 7  G6PD 279 normal    ROSARIO neg  RF <10    Epo 22.4    Cr 1.6     nl  B12>2k  Homocysteine 10.5  Folate >20    Liver Spleen US:  Portal vein 1.7cm  dilated  Smv Dilated 1.3 cm  But nl spleen size  Hepatomegaly with abnormal liver echogenicity.    Serum K 57.9, lambda 30.7, ratio 1.89.  Serum M spike zero    -5/26/2020 CT chest low-dose without contrast shows chronic granulomatous nodules lung RADS 2 with recommended annual follow-up.  Increased prominence of mucosal thickening distal esophagus compared to 2017 suggestive of esophagitis.    -7/17/2020 gastric angioma ectasias on EGD with Dr. Perez.  No esophageal abnormalities to corroborate with the above CT findings.    -7/27/2020 data:  White count 3600 with hemoglobin 11.6 platelets 137,000.  Creatinine 1.3 with GFR 65  Beta-2 microglobulin 3.2, upper limit 2.2  C-reactive protein 0.49/sedimentation rate 24  Serum immunoelectrophoresis showed no monoclonal spike  Serum free kappa light chains 52.8 with ratio of 2.25  Urine kappa light chains normal 41 with lambda normal 6 and ratio normal at 6  Bone survey essentially negative for myeloma save for a small minor posterior calvarial abnormality most likely venous lake.  Coincidental cardiomegaly with mild pulmonary vascular congestion.      -8/4/2020 data:   Sedimentation rate 37  White count 3100 hemoglobin 11.7 with platelets 115,000  Creatinine 1.27 with GFR greater than 60 and calcium 9.1 normal with normal total protein and albumin    -8/17/2020 hematology follow-up visit: Reviewed above data with patient.  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  Given persistence of monoclonal light chain with mild renal dysfunction even if the cranial abnormality is just a venous lake, I would still complete work-up with bone marrow biopsy to ensure no plasma cell dyscrasia, myelodysplasia, aplasia.    -8/20/2020 bone marrow biopsy showed mild  hypercellularity 33% with out atypia.  No increase in plasma cells.  No T-cell or B-cell clonality on flow.  Normal cytogenetics.  Hemoglobin 11.3 with platelets 123,000.  Hence low likelihood for plasma cell dyscrasia or myelodysplasia.    -9/3/2020 hematology follow-up visit: Reviewed results of bone marrow biopsy.  No hint of myelodysplasia or plasma cell dyscrasia or myelophthisic process.  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  Suspect the small light chain clone is reactive secondary to rheumatoid arthritis or other inflammatory conditions and we will repeat CBC and myeloma panel again in 6 months and if stable will go to annual.  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 Perez.      Yuval Herrera MD

## 2021-03-22 ENCOUNTER — LAB (OUTPATIENT)
Dept: LAB | Facility: HOSPITAL | Age: 76
End: 2021-03-22

## 2021-03-22 ENCOUNTER — OFFICE VISIT (OUTPATIENT)
Dept: ONCOLOGY | Facility: CLINIC | Age: 76
End: 2021-03-22

## 2021-03-22 VITALS — HEIGHT: 69 IN | BODY MASS INDEX: 35.55 KG/M2 | WEIGHT: 240 LBS

## 2021-03-22 DIAGNOSIS — D61.818 PANCYTOPENIA (HCC): ICD-10-CM

## 2021-03-22 DIAGNOSIS — D47.2 MONOCLONAL GAMMOPATHY: ICD-10-CM

## 2021-03-22 DIAGNOSIS — D47.2 MONOCLONAL GAMMOPATHY: Primary | ICD-10-CM

## 2021-03-22 LAB
ALBUMIN SERPL-MCNC: 3.7 G/DL (ref 3.5–5.2)
ALBUMIN/GLOB SERPL: 1.3 G/DL
ALP SERPL-CCNC: 66 U/L (ref 39–117)
ALT SERPL W P-5'-P-CCNC: 8 U/L (ref 1–41)
ANION GAP SERPL CALCULATED.3IONS-SCNC: 8 MMOL/L (ref 5–15)
AST SERPL-CCNC: 13 U/L (ref 1–40)
B2 MICROGLOB SERPL-MCNC: 3 MG/L (ref 0.8–2.2)
BASOPHILS # BLD AUTO: 0.05 10*3/MM3 (ref 0–0.2)
BASOPHILS NFR BLD AUTO: 1.8 % (ref 0–1.5)
BILIRUB SERPL-MCNC: 0.5 MG/DL (ref 0–1.2)
BUN SERPL-MCNC: 19 MG/DL (ref 8–23)
BUN/CREAT SERPL: 16.5 (ref 7–25)
CA-I SERPL ISE-MCNC: 1.37 MMOL/L (ref 1.12–1.32)
CALCIUM SPEC-SCNC: 9 MG/DL (ref 8.6–10.5)
CHLORIDE SERPL-SCNC: 110 MMOL/L (ref 98–107)
CO2 SERPL-SCNC: 29 MMOL/L (ref 22–29)
CREAT SERPL-MCNC: 1.15 MG/DL (ref 0.76–1.27)
CRP SERPL-MCNC: 0.91 MG/DL (ref 0–0.5)
DEPRECATED RDW RBC AUTO: 41.2 FL (ref 37–54)
EOSINOPHIL # BLD AUTO: 0.13 10*3/MM3 (ref 0–0.4)
EOSINOPHIL NFR BLD AUTO: 4.6 % (ref 0.3–6.2)
ERYTHROCYTE [DISTWIDTH] IN BLOOD BY AUTOMATED COUNT: 12.6 % (ref 12.3–15.4)
ERYTHROCYTE [SEDIMENTATION RATE] IN BLOOD: 21 MM/HR (ref 0–20)
GFR SERPL CREATININE-BSD FRML MDRD: 75 ML/MIN/1.73
GLOBULIN UR ELPH-MCNC: 2.8 GM/DL
GLUCOSE SERPL-MCNC: 141 MG/DL (ref 65–99)
HCT VFR BLD AUTO: 33.5 % (ref 37.5–51)
HGB BLD-MCNC: 11 G/DL (ref 13–17.7)
IMM GRANULOCYTES # BLD AUTO: 0.01 10*3/MM3 (ref 0–0.05)
IMM GRANULOCYTES NFR BLD AUTO: 0.4 % (ref 0–0.5)
LYMPHOCYTES # BLD AUTO: 1.1 10*3/MM3 (ref 0.7–3.1)
LYMPHOCYTES NFR BLD AUTO: 39.1 % (ref 19.6–45.3)
MCH RBC QN AUTO: 29.7 PG (ref 26.6–33)
MCHC RBC AUTO-ENTMCNC: 32.8 G/DL (ref 31.5–35.7)
MCV RBC AUTO: 90.5 FL (ref 79–97)
MONOCYTES # BLD AUTO: 0.32 10*3/MM3 (ref 0.1–0.9)
MONOCYTES NFR BLD AUTO: 11.4 % (ref 5–12)
NEUTROPHILS NFR BLD AUTO: 1.2 10*3/MM3 (ref 1.7–7)
NEUTROPHILS NFR BLD AUTO: 42.7 % (ref 42.7–76)
NRBC BLD AUTO-RTO: 0 /100 WBC (ref 0–0.2)
PLATELET # BLD AUTO: 134 10*3/MM3 (ref 140–450)
PMV BLD AUTO: 11.1 FL (ref 6–12)
POTASSIUM SERPL-SCNC: 4 MMOL/L (ref 3.5–5.2)
PROT SERPL-MCNC: 6.5 G/DL (ref 6–8.5)
RBC # BLD AUTO: 3.7 10*6/MM3 (ref 4.14–5.8)
SODIUM SERPL-SCNC: 147 MMOL/L (ref 136–145)
WBC # BLD AUTO: 2.81 10*3/MM3 (ref 3.4–10.8)

## 2021-03-22 PROCEDURE — 86334 IMMUNOFIX E-PHORESIS SERUM: CPT

## 2021-03-22 PROCEDURE — 85025 COMPLETE CBC W/AUTO DIFF WBC: CPT

## 2021-03-22 PROCEDURE — 83883 ASSAY NEPHELOMETRY NOT SPEC: CPT

## 2021-03-22 PROCEDURE — 36415 COLL VENOUS BLD VENIPUNCTURE: CPT

## 2021-03-22 PROCEDURE — 82785 ASSAY OF IGE: CPT

## 2021-03-22 PROCEDURE — 99213 OFFICE O/P EST LOW 20 MIN: CPT | Performed by: INTERNAL MEDICINE

## 2021-03-22 PROCEDURE — 84165 PROTEIN E-PHORESIS SERUM: CPT

## 2021-03-22 PROCEDURE — 82232 ASSAY OF BETA-2 PROTEIN: CPT

## 2021-03-22 PROCEDURE — 80053 COMPREHEN METABOLIC PANEL: CPT

## 2021-03-22 PROCEDURE — 82784 ASSAY IGA/IGD/IGG/IGM EACH: CPT

## 2021-03-22 PROCEDURE — 82330 ASSAY OF CALCIUM: CPT

## 2021-03-22 PROCEDURE — 85652 RBC SED RATE AUTOMATED: CPT

## 2021-03-22 PROCEDURE — 86140 C-REACTIVE PROTEIN: CPT

## 2021-03-22 RX ORDER — OXYBUTYNIN CHLORIDE 5 MG/1
5 TABLET ORAL 2 TIMES DAILY
COMMUNITY
Start: 2021-03-09

## 2021-03-22 RX ORDER — FAMOTIDINE 20 MG/1
20 TABLET, FILM COATED ORAL 2 TIMES DAILY
COMMUNITY
Start: 2021-02-12

## 2021-03-22 NOTE — PROGRESS NOTES
Telehealth follow-up visit  This was an audio and video enabled telemedicine encounter.  Video failed.  Audio converted.  Verbal consent given.  Done for COVID-19 risk reduction  CHIEF COMPLAINT: Still with rash    Problem List:  Oncology/Hematology History Overview Note   1.  Pancytopenia with combination of anemia renal disease, possible sequestration with the hepatomegaly/portal vein dilation/SMV dilation on February 2020 liver spleen ultrasound suggesting portal hypertension and possible contribution from the gastric vascular ectasias on EGD  elevation of serum kappa and lambda light chains and gastric angioectasias on 7/17/2020 EGD  2.  Chronic low back pain   3.  Chronic kidney disease  4.  Type 2 diabetes  5.  Hyperlipidemia  6.  Hypertension  7.  Hypogonadism  8.  Depression  9. COPD  10.  Obstructive sleep apnea  11.  Reflux   12.  Gout  13 osteoarthritis  14.  BPH with ED  15.  BMI 36  16.  Putative history of rheumatoid arthritis  17.  Chronic granulomatous lung nodules on CT chest screening with routine follow-up recommended  18.  Scant elevation of serum light chains without intact monoclonal gammopathy and no plasma cell dyscrasia on bone marrow biopsy August 2020    Hematology history:  -Longstanding history of heme positive stools dating back to the late 80s with at least 5 colonoscopies by Dr. Perez including the last 1/2018 as well as EGDs and a capsule endoscopy that apparently showed scattered blood that they could not do much about.In the [facility] system we have hemoglobins of 9.8 and platelet of 121,000 with white count 4880 as of June 2016 and December 2015 white count 3020 with platelets 126,000 hemoglobin 11.4.  Does have a history of heavy alcohol use but none in the past several years.  -8/7/2019 Hemoccult negative  -1/30/2020 reticulocyte count 1.4%.  B12 618.  Iron slightly low 48.  White count 2700 with hemoglobin 11.6 MCV 88 and normal red cell distribution width with platelets 124,000.   Normal thyroid functions.  -2/4/2020 white count 3100 hemoglobin 11.7 with normal MCV and red cell distribution with platelets 126,000 normal liver enzymes and bilirubin including fractionation.  Creatinine 1.36 GFR greater than 60 with normal calcium 9.1.  .  C-reactive protein 9.67 with sedimentation rate elevated at 40 as well.  Was started on iron with vitamin C.    -2/27/2020 initial [employer] hematology consultation: He had dark tarry stools predating the institution of his current iron and extensive prior endoscopic work-up as outlined above.  I will check his methylmalonic acid and homocystine along with repeat B12 and folate and with his elevated C-reactive protein and sedimentation rate will check a serum immunoelectrophoresis and light chains.  Given his prior drinking history despite the fact that his liver enzymes have been normal I strongly suspect portal hypertension and I suspect the GI bleeding may be related to this but we will get records from .  If we do not get answers from this then we will proceed with bone marrow biopsy.  If there is a monoclonal gammopathy we will also proceed with bone marrow biopsy.  With his putative history of rheumatoid arthritis he could have splenomegaly with Felty syndrome as well and I will check his ROSARIO and rheumatoid factor.    -2/27/20 data:  Hgb 11.4 o/w normal CBC  Periph. Smear mild hypochromic anemia with mature WBC's    ZACKARY neg  Haptogb 213 high  Retic 2 %    Nl bili direct and indirect  Urine hemosiderin negative  Plasma free hemoglobin normal 7  G6PD 279 normal    ROSARIO neg  RF <10    Epo 22.4    Cr 1.6     nl  B12>2k  Homocysteine 10.5  Folate >20    Liver Spleen US:  Portal vein 1.7cm dilated  Smv Dilated 1.3 cm  But nl spleen size  Hepatomegaly with abnormal liver echogenicity.    Serum K 57.9, lambda 30.7, ratio 1.89.  Serum M spike zero    -5/26/2020 CT chest low-dose without contrast shows chronic granulomatous nodules lung RADS 2  with recommended annual follow-up.  Increased prominence of mucosal thickening distal esophagus compared to 2017 suggestive of esophagitis.    -7/17/2020 gastric angioma ectasias on EGD with Dr. Perez.  No esophageal abnormalities to corroborate with the above CT findings.    -7/27/2020 data:  White count 3600 with hemoglobin 11.6 platelets 137,000.  Creatinine 1.3 with GFR 65  Beta-2 microglobulin 3.2, upper limit 2.2  C-reactive protein 0.49/sedimentation rate 24  Serum immunoelectrophoresis showed no monoclonal spike  Serum free kappa light chains 52.8 with ratio of 2.25  Urine kappa light chains normal 41 with lambda normal 6 and ratio normal at 6  Bone survey essentially negative for myeloma save for a small minor posterior calvarial abnormality most likely venous lake.  Coincidental cardiomegaly with mild pulmonary vascular congestion.      -8/4/2020 data:   Sedimentation rate 37  White count 3100 hemoglobin 11.7 with platelets 115,000  Creatinine 1.27 with GFR greater than 60 and calcium 9.1 normal with normal total protein and albumin    -8/17/2020 hematology follow-up visit: Reviewed above data with patient.  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  Given persistence of monoclonal light chain with mild renal dysfunction even if the cranial abnormality is just a venous lake, I would still complete work-up with bone marrow biopsy to ensure no plasma cell dyscrasia, myelodysplasia, aplasia.    -8/20/2020 bone marrow biopsy showed mild hypercellularity 33% with out atypia.  No increase in plasma cells.  No T-cell or B-cell clonality on flow.  Normal cytogenetics.  Hemoglobin 11.3 with platelets 123,000.  Hence low likelihood for plasma cell dyscrasia or myelodysplasia.    -9/3/2020 hematology  "follow-up visit: Reviewed results of bone marrow biopsy.  No hint of myelodysplasia or plasma cell dyscrasia or myelophthisic process.  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                     Pancytopenia (CMS/HCC)   2/26/2020 Initial Diagnosis    Pancytopenia (CMS/HCC)         HISTORY OF PRESENT ILLNESS:  The patient is a 76 y.o. male, here for follow up on management of pancytopenia with portal hypertension and thrush with history of scant light chains.  Has not done any recent labs    Past Medical History:   Diagnosis Date   • Arthritis    • Asthma    • Bowel trouble    • Chondrocalcinosis of right knee    • Colitis    • COPD (chronic obstructive pulmonary disease) (CMS/HCC)    • Diabetes mellitus (CMS/HCC)    • Esophagitis    • Gout    • H/O bladder problems    • Heart murmur    • Hypertension    • IBS (irritable bowel syndrome)    • JONAH on CPAP    • Primary osteoarthritis of both knees    • Rheumatoid arthritis (CMS/HCC)    • Skin problem    • SOB (shortness of breath)      Past Surgical History:   Procedure Laterality Date   • COLONOSCOPY     • KNEE ARTHROSCOPY Left    • PARTIAL KNEE ARTHROPLASTY Left    • SKIN BIOPSY     • STOMACH SURGERY     • UPPER GASTROINTESTINAL ENDOSCOPY     • VASECTOMY         Allergies   Allergen Reactions   • Lisinopril Swelling   • Benazepril Swelling       Family History and Social History reviewed and changed as necessary    REVIEW OF SYSTEM:   Other than for the thrush on his tongue he has no new complaints and this has never really resolved.    PHYSICAL EXAM:  Phone visit    Vitals:    03/22/21 0931   Weight: 109 kg (240 lb)   Height: 175.3 cm (69\") "     Vitals:    03/22/21 0931   PainSc: 0-No pain          Vitals reviewed.        Lab Results   Component Value Date    HGB 11.3 (L) 08/20/2020    HCT 33.4 (L) 08/20/2020    MCV 89.1 08/20/2020     (L) 08/20/2020    WBC 3.40 08/20/2020    NEUTROABS 1.60 (L) 08/20/2020    LYMPHSABS 1.18 08/20/2020    MONOSABS 0.40 08/20/2020    EOSABS 0.16 08/20/2020    BASOSABS 0.05 08/20/2020       Lab Results   Component Value Date    GLUCOSE 108 (H) 07/27/2020    BUN 15 07/27/2020    CREATININE 1.30 (H) 07/27/2020     07/27/2020    K 4.2 07/27/2020     (H) 07/27/2020    CO2 29.0 07/27/2020    CALCIUM 9.3 07/27/2020    PROTEINTOT 7.3 07/27/2020    ALBUMIN 3.5 07/27/2020    BILITOT 0.5 07/27/2020    ALKPHOS 59 07/27/2020    AST 16 07/27/2020    ALT 14 07/27/2020             ASSESSMENT & PLAN:  1.  Pancytopenia with combination of anemia renal disease, possible sequestration with the hepatomegaly/portal vein dilation/SMV dilation on February 2020 liver spleen ultrasound suggesting portal hypertension and possible contribution from the gastric vascular ectasias on EGD  elevation of serum kappa and lambda light chains and gastric angioectasias on 7/17/2020 EGD  2.  Chronic low back pain   3.  Chronic kidney disease  4.  Type 2 diabetes  5.  Hyperlipidemia  6.  Hypertension  7.  Hypogonadism  8.  Depression  9. COPD  10.  Obstructive sleep apnea  11.  Reflux   12.  Gout  13 osteoarthritis  14.  BPH with ED  15.  BMI 36  16.  Putative history of rheumatoid arthritis  17.  Chronic granulomatous lung nodules on CT chest screening with routine follow-up recommended  18.  Scant elevation of serum light chains without intact monoclonal gammopathy and no plasma cell dyscrasia on bone marrow biopsy August 2020    Discussion: He will get his labs as previously ordered now and I will see him back in a few weeks in person to go over these results.  Total time of care today going over his past history and making plans for the  upcoming labs and discussing the need and the purpose for them and reviewing once again his prior bone marrow results and his portal hypertensive picture previously as outlined took 15 minutes of patient care time today  Yuval Herrera MD    03/22/2021

## 2021-03-24 ENCOUNTER — LAB (OUTPATIENT)
Dept: LAB | Facility: HOSPITAL | Age: 76
End: 2021-03-24

## 2021-03-24 LAB
ALBUMIN SERPL ELPH-MCNC: 3.4 G/DL (ref 2.9–4.4)
ALBUMIN/GLOB SERPL: 1.1 {RATIO} (ref 0.7–1.7)
ALPHA1 GLOB SERPL ELPH-MCNC: 0.2 G/DL (ref 0–0.4)
ALPHA2 GLOB SERPL ELPH-MCNC: 0.8 G/DL (ref 0.4–1)
B-GLOBULIN SERPL ELPH-MCNC: 0.9 G/DL (ref 0.7–1.3)
GAMMA GLOB SERPL ELPH-MCNC: 1.2 G/DL (ref 0.4–1.8)
GLOBULIN SER-MCNC: 3.1 G/DL (ref 2.2–3.9)
IGA SERPL-MCNC: 163 MG/DL (ref 61–437)
IGG SERPL-MCNC: 1342 MG/DL (ref 603–1613)
IGM SERPL-MCNC: 26 MG/DL (ref 15–143)
INTERPRETATION SERPL IEP-IMP: NORMAL
KAPPA LC FREE SER-MCNC: 37.7 MG/L (ref 3.3–19.4)
KAPPA LC FREE/LAMBDA FREE SER: 1.55 {RATIO} (ref 0.26–1.65)
LABORATORY COMMENT REPORT: NORMAL
LAMBDA LC FREE SERPL-MCNC: 24.4 MG/L (ref 5.7–26.3)
M PROTEIN SERPL ELPH-MCNC: NORMAL G/DL
PROT SERPL-MCNC: 6.5 G/DL (ref 6–8.5)

## 2021-03-24 PROCEDURE — 84166 PROTEIN E-PHORESIS/URINE/CSF: CPT

## 2021-03-24 PROCEDURE — 86335 IMMUNFIX E-PHORSIS/URINE/CSF: CPT

## 2021-03-24 PROCEDURE — 81050 URINALYSIS VOLUME MEASURE: CPT

## 2021-03-24 PROCEDURE — 84156 ASSAY OF PROTEIN URINE: CPT

## 2021-03-26 LAB
IGA SERPL-MCNC: 163 MG/DL (ref 61–437)
IGE SERPL-ACNC: 32 IU/ML (ref 6–495)
IGG SERPL-MCNC: 1369 MG/DL (ref 603–1613)
IGM SERPL-MCNC: 29 MG/DL (ref 15–143)

## 2021-03-30 LAB
ALBUMIN 24H MFR UR ELPH: 64.6 %
ALPHA1 GLOB 24H MFR UR ELPH: 2.8 %
ALPHA2 GLOB 24H MFR UR ELPH: 10.3 %
B-GLOBULIN MFR UR ELPH: 12.1 %
GAMMA GLOB 24H MFR UR ELPH: 10.3 %
HIV 1 & 2 AB SER-IMP: ABNORMAL
INTERPRETATION UR IFE-IMP: ABNORMAL
M PROTEIN 24H MFR UR ELPH: ABNORMAL %
PROT 24H UR-MRATE: 181 MG/24 HR (ref 30–150)
PROT UR-MCNC: 20.1 MG/DL

## 2021-04-26 ENCOUNTER — LAB (OUTPATIENT)
Dept: LAB | Facility: HOSPITAL | Age: 76
End: 2021-04-26

## 2021-04-26 ENCOUNTER — OFFICE VISIT (OUTPATIENT)
Dept: ONCOLOGY | Facility: CLINIC | Age: 76
End: 2021-04-26

## 2021-04-26 VITALS
TEMPERATURE: 98 F | OXYGEN SATURATION: 96 % | HEART RATE: 69 BPM | SYSTOLIC BLOOD PRESSURE: 114 MMHG | BODY MASS INDEX: 35.25 KG/M2 | WEIGHT: 238 LBS | RESPIRATION RATE: 18 BRPM | DIASTOLIC BLOOD PRESSURE: 49 MMHG | HEIGHT: 69 IN

## 2021-04-26 DIAGNOSIS — D61.818 PANCYTOPENIA (HCC): Primary | ICD-10-CM

## 2021-04-26 DIAGNOSIS — D61.818 PANCYTOPENIA (HCC): ICD-10-CM

## 2021-04-26 DIAGNOSIS — R80.3 FREE MONOCLONAL LIGHT CHAIN: ICD-10-CM

## 2021-04-26 LAB
ALBUMIN SERPL-MCNC: 3.7 G/DL (ref 3.5–5.2)
ALBUMIN/GLOB SERPL: 1.3 G/DL
ALP SERPL-CCNC: 68 U/L (ref 39–117)
ALT SERPL W P-5'-P-CCNC: 22 U/L (ref 1–41)
ANION GAP SERPL CALCULATED.3IONS-SCNC: 7 MMOL/L (ref 5–15)
AST SERPL-CCNC: 19 U/L (ref 1–40)
BILIRUB SERPL-MCNC: 0.4 MG/DL (ref 0–1.2)
BUN SERPL-MCNC: 25 MG/DL (ref 8–23)
BUN/CREAT SERPL: 17 (ref 7–25)
CALCIUM SPEC-SCNC: 8.9 MG/DL (ref 8.6–10.5)
CHLORIDE SERPL-SCNC: 108 MMOL/L (ref 98–107)
CO2 SERPL-SCNC: 30 MMOL/L (ref 22–29)
CREAT SERPL-MCNC: 1.47 MG/DL (ref 0.76–1.27)
ERYTHROCYTE [DISTWIDTH] IN BLOOD BY AUTOMATED COUNT: 12.9 % (ref 12.3–15.4)
GFR SERPL CREATININE-BSD FRML MDRD: 56 ML/MIN/1.73
GLOBULIN UR ELPH-MCNC: 2.9 GM/DL
GLUCOSE SERPL-MCNC: 122 MG/DL (ref 65–99)
HCT VFR BLD AUTO: 32 % (ref 37.5–51)
HGB BLD-MCNC: 10.7 G/DL (ref 13–17.7)
LYMPHOCYTES # BLD AUTO: 1.5 10*3/MM3 (ref 0.7–3.1)
LYMPHOCYTES NFR BLD AUTO: 40.4 % (ref 19.6–45.3)
MCH RBC QN AUTO: 30.1 PG (ref 26.6–33)
MCHC RBC AUTO-ENTMCNC: 33.3 G/DL (ref 31.5–35.7)
MCV RBC AUTO: 90.2 FL (ref 79–97)
MONOCYTES # BLD AUTO: 0.4 10*3/MM3 (ref 0.1–0.9)
MONOCYTES NFR BLD AUTO: 10.2 % (ref 5–12)
NEUTROPHILS NFR BLD AUTO: 1.8 10*3/MM3 (ref 1.7–7)
NEUTROPHILS NFR BLD AUTO: 49.4 % (ref 42.7–76)
PLATELET # BLD AUTO: 133 10*3/MM3 (ref 140–450)
PMV BLD AUTO: 6.5 FL (ref 6–12)
POTASSIUM SERPL-SCNC: 4.3 MMOL/L (ref 3.5–5.2)
PROT SERPL-MCNC: 6.6 G/DL (ref 6–8.5)
RBC # BLD AUTO: 3.55 10*6/MM3 (ref 4.14–5.8)
SODIUM SERPL-SCNC: 145 MMOL/L (ref 136–145)
WBC # BLD AUTO: 3.6 10*3/MM3 (ref 3.4–10.8)

## 2021-04-26 PROCEDURE — 82784 ASSAY IGA/IGD/IGG/IGM EACH: CPT

## 2021-04-26 PROCEDURE — 99214 OFFICE O/P EST MOD 30 MIN: CPT | Performed by: INTERNAL MEDICINE

## 2021-04-26 PROCEDURE — 83883 ASSAY NEPHELOMETRY NOT SPEC: CPT

## 2021-04-26 PROCEDURE — 85025 COMPLETE CBC W/AUTO DIFF WBC: CPT

## 2021-04-26 PROCEDURE — 84165 PROTEIN E-PHORESIS SERUM: CPT

## 2021-04-26 PROCEDURE — 80053 COMPREHEN METABOLIC PANEL: CPT

## 2021-04-26 PROCEDURE — 86334 IMMUNOFIX E-PHORESIS SERUM: CPT

## 2021-04-26 PROCEDURE — 36415 COLL VENOUS BLD VENIPUNCTURE: CPT

## 2021-04-26 RX ORDER — BACLOFEN 20 MG/1
5 TABLET ORAL 3 TIMES DAILY
Status: ON HOLD | COMMUNITY
End: 2023-03-06

## 2021-04-26 NOTE — PROGRESS NOTES
CHIEF COMPLAINT: Follow-up pancytopenia    Problem List:  Oncology/Hematology History Overview Note   1.  Pancytopenia with combination of anemia renal disease, possible sequestration with the hepatomegaly/portal vein dilation/SMV dilation on February 2020 liver spleen ultrasound suggesting portal hypertension and possible contribution from the gastric vascular ectasias on EGD  elevation of serum kappa and lambda light chains and gastric angioectasias on 7/17/2020 EGD  2.  Chronic low back pain   3.  Chronic kidney disease  4.  Type 2 diabetes  5.  Hyperlipidemia  6.  Hypertension  7.  Hypogonadism  8.  Depression  9. COPD  10.  Obstructive sleep apnea  11.  Reflux   12.  Gout  13 osteoarthritis  14.  BPH with ED  15.  BMI 36  16.  Putative history of rheumatoid arthritis  17.  Chronic granulomatous lung nodules on CT chest screening with routine follow-up recommended  18.  Scant elevation of serum light chains without intact monoclonal gammopathy and no plasma cell dyscrasia on bone marrow biopsy August 2020    Hematology history:  -Longstanding history of heme positive stools dating back to the late 80s with at least 5 colonoscopies by Dr. Perez including the last 1/2018 as well as EGDs and a capsule endoscopy that apparently showed scattered blood that they could not do much about.In the [facility] system we have hemoglobins of 9.8 and platelet of 121,000 with white count 4880 as of June 2016 and December 2015 white count 3020 with platelets 126,000 hemoglobin 11.4.  Does have a history of heavy alcohol use but none in the past several years.  -8/7/2019 Hemoccult negative  -1/30/2020 reticulocyte count 1.4%.  B12 618.  Iron slightly low 48.  White count 2700 with hemoglobin 11.6 MCV 88 and normal red cell distribution width with platelets 124,000.  Normal thyroid functions.  -2/4/2020 white count 3100 hemoglobin 11.7 with normal MCV and red cell distribution with platelets 126,000 normal liver enzymes and bilirubin  including fractionation.  Creatinine 1.36 GFR greater than 60 with normal calcium 9.1.  .  C-reactive protein 9.67 with sedimentation rate elevated at 40 as well.  Was started on iron with vitamin C.    -2/27/2020 initial [facility] hematology consultation: He had dark tarry stools predating the institution of his current iron and extensive prior endoscopic work-up as outlined above.  I will check his methylmalonic acid and homocystine along with repeat B12 and folate and with his elevated C-reactive protein and sedimentation rate will check a serum immunoelectrophoresis and light chains.  Given his prior drinking history despite the fact that his liver enzymes have been normal I strongly suspect portal hypertension and I suspect the GI bleeding may be related to this but we will get records from .  If we do not get answers from this then we will proceed with bone marrow biopsy.  If there is a monoclonal gammopathy we will also proceed with bone marrow biopsy.  With his putative history of rheumatoid arthritis he could have splenomegaly with Felty syndrome as well and I will check his ROSARIO and rheumatoid factor.    -2/27/20 data:  Hgb 11.4 o/w normal CBC  Periph. Smear mild hypochromic anemia with mature WBC's    ZACKARY neg  Haptogb 213 high  Retic 2 %    Nl bili direct and indirect  Urine hemosiderin negative  Plasma free hemoglobin normal 7  G6PD 279 normal    ROSARIO neg  RF <10    Epo 22.4    Cr 1.6     nl  B12>2k  Homocysteine 10.5  Folate >20    Liver Spleen US:  Portal vein 1.7cm dilated  Smv Dilated 1.3 cm  But nl spleen size  Hepatomegaly with abnormal liver echogenicity.    Serum K 57.9, lambda 30.7, ratio 1.89.  Serum M spike zero    -5/26/2020 CT chest low-dose without contrast shows chronic granulomatous nodules lung RADS 2 with recommended annual follow-up.  Increased prominence of mucosal thickening distal esophagus compared to 2017 suggestive of esophagitis.    -7/17/2020 gastric angioma  ectasias on EGD with Dr. Perez.  No esophageal abnormalities to corroborate with the above CT findings.    -7/27/2020 data:  White count 3600 with hemoglobin 11.6 platelets 137,000.  Creatinine 1.3 with GFR 65  Beta-2 microglobulin 3.2, upper limit 2.2  C-reactive protein 0.49/sedimentation rate 24  Serum immunoelectrophoresis showed no monoclonal spike  Serum free kappa light chains 52.8 with ratio of 2.25  Urine kappa light chains normal 41 with lambda normal 6 and ratio normal at 6  Bone survey essentially negative for myeloma save for a small minor posterior calvarial abnormality most likely venous lake.  Coincidental cardiomegaly with mild pulmonary vascular congestion.      -8/4/2020 data:   Sedimentation rate 37  White count 3100 hemoglobin 11.7 with platelets 115,000  Creatinine 1.27 with GFR greater than 60 and calcium 9.1 normal with normal total protein and albumin    -8/17/2020 hematology follow-up visit: Reviewed above data with patient.  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  Given persistence of monoclonal light chain with mild renal dysfunction even if the cranial abnormality is just a venous lake, I would still complete work-up with bone marrow biopsy to ensure no plasma cell dyscrasia, myelodysplasia, aplasia.    -8/20/2020 bone marrow biopsy showed mild hypercellularity 33% with out atypia.  No increase in plasma cells.  No T-cell or B-cell clonality on flow.  Normal cytogenetics.  Hemoglobin 11.3 with platelets 123,000.  Hence low likelihood for plasma cell dyscrasia or myelodysplasia.    -9/3/2020 hematology follow-up visit: Reviewed results of bone marrow biopsy.  No hint of myelodysplasia or plasma cell dyscrasia or myelophthisic process.  Doubt the above previously  mentioned cranial abnormality is significant and likely a benign venous lake in my opinion.  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    -4/26/2021 [facility] hematology oncology follow-up visit: I reviewed his 3/22/2021 data: White count 2810 with hemoglobin 11 with platelets 134,000 and absolute neutrophil count 1200.  CMP unremarkable with normal liver enzymes and creatinine 1.15 with normal calcium 9.0 and normal total protein and alkaline phosphatase.  No serum M spike and scant elevation of kappa light chain 37.7 with normal lambda all improved over the last year.  Normal quantitative immunoglobulins.  24-hour urine immunoelectrophoresis had no monoclonality.  Sedimentation rate 21 with C-reactive protein 0.91 both minimally elevated.  Ionized calcium just barely above normal 1.37.  Beta-2 microglobulin 3.0 stable since July.  The bulk of his pancytopenia is sequestered if portal hypertension.  No significant kappa light chains in the serum and he has gastric angio ectasias.  We will follow with Dr. Perez and I will repeat his M panel again in 1 year and if that is stable would probably discontinue routine M panel testing as I suspect this is just an inflammatory marker.                   Pancytopenia (CMS/HCC)   2/26/2020 Initial Diagnosis    Pancytopenia (CMS/HCC)         HISTORY OF PRESENT ILLNESS:  The patient is a 76 y.o. male, here for follow up on management of follow-up pancytopenia and light chains    Past Medical History:   Diagnosis Date   • Arthritis    • Asthma    • Bowel trouble    • Chondrocalcinosis of right knee    • Colitis    • COPD (chronic obstructive pulmonary disease) (CMS/HCC)    • Diabetes mellitus (CMS/HCC)    • Esophagitis    • Gout    • H/O bladder problems    • Heart murmur   "  • Hypertension    • IBS (irritable bowel syndrome)    • JONAH on CPAP    • Primary osteoarthritis of both knees    • Rheumatoid arthritis (CMS/HCC)    • Skin problem    • SOB (shortness of breath)      Past Surgical History:   Procedure Laterality Date   • COLONOSCOPY     • KNEE ARTHROSCOPY Left    • PARTIAL KNEE ARTHROPLASTY Left    • SKIN BIOPSY     • STOMACH SURGERY     • UPPER GASTROINTESTINAL ENDOSCOPY     • VASECTOMY         Allergies   Allergen Reactions   • Lisinopril Swelling   • Benazepril Swelling       Family History and Social History reviewed and changed as necessary    REVIEW OF SYSTEM:   No new complaints    PHYSICAL EXAM:  Lungs clear.  Heart regular rate.  No hepatojugular reflux but spleen just palpable at the costal margin mid clavicular line    Vitals:    04/26/21 1116   BP: 114/49   Pulse: 69   Resp: 18   Temp: 98 °F (36.7 °C)   SpO2: 96%   Weight: 108 kg (238 lb)   Height: 175.3 cm (69\")     Vitals:    04/26/21 1116   PainSc: 0-No pain          ECOG score: 0           Vitals reviewed.    ECOG: (0) Fully Active - Able to Carry On All Pre-disease Performance Without Restriction    Lab Results   Component Value Date    HGB 10.7 (L) 04/26/2021    HCT 32.0 (L) 04/26/2021    MCV 90.2 04/26/2021     (L) 04/26/2021    WBC 3.60 04/26/2021    NEUTROABS 1.80 04/26/2021    LYMPHSABS 1.50 04/26/2021    MONOSABS 0.40 04/26/2021    EOSABS 0.13 03/22/2021    BASOSABS 0.05 03/22/2021       Lab Results   Component Value Date    GLUCOSE 141 (H) 03/22/2021    BUN 19 03/22/2021    CREATININE 1.15 03/22/2021     (H) 03/22/2021    K 4.0 03/22/2021     (H) 03/22/2021    CO2 29.0 03/22/2021    CALCIUM 9.0 03/22/2021    PROTEINTOT 6.5 03/22/2021    ALBUMIN 3.70 03/22/2021    ALBUMIN 3.4 03/22/2021    BILITOT 0.5 03/22/2021    ALKPHOS 66 03/22/2021    AST 13 03/22/2021    ALT 8 03/22/2021             ASSESSMENT & PLAN:  1.  Pancytopenia with combination of anemia renal disease, possible sequestration " with the hepatomegaly/portal vein dilation/SMV dilation on February 2020 liver spleen ultrasound suggesting portal hypertension and possible contribution from the gastric vascular ectasias on EGD  elevation of serum kappa and lambda light chains and gastric angioectasias on 7/17/2020 EGD  2.  Chronic low back pain   3.  Chronic kidney disease  4.  Type 2 diabetes  5.  Hyperlipidemia  6.  Hypertension  7.  Hypogonadism  8.  Depression  9. COPD  10.  Obstructive sleep apnea  11.  Reflux   12.  Gout  13 osteoarthritis  14.  BPH with ED  15.  BMI 36  16.  Putative history of rheumatoid arthritis  17.  Chronic granulomatous lung nodules on CT chest screening with routine follow-up recommended  18.  Scant elevation of serum light chains without intact monoclonal gammopathy and no plasma cell dyscrasia on bone marrow biopsy August 2020    Hematology history:  -Longstanding history of heme positive stools dating back to the late 80s with at least 5 colonoscopies by Dr. Perez including the last 1/2018 as well as EGDs and a capsule endoscopy that apparently showed scattered blood that they could not do much about.In the [facility] system we have hemoglobins of 9.8 and platelet of 121,000 with white count 4880 as of June 2016 and December 2015 white count 3020 with platelets 126,000 hemoglobin 11.4.  Does have a history of heavy alcohol use but none in the past several years.  -8/7/2019 Hemoccult negative  -1/30/2020 reticulocyte count 1.4%.  B12 618.  Iron slightly low 48.  White count 2700 with hemoglobin 11.6 MCV 88 and normal red cell distribution width with platelets 124,000.  Normal thyroid functions.  -2/4/2020 white count 3100 hemoglobin 11.7 with normal MCV and red cell distribution with platelets 126,000 normal liver enzymes and bilirubin including fractionation.  Creatinine 1.36 GFR greater than 60 with normal calcium 9.1.  .  C-reactive protein 9.67 with sedimentation rate elevated at 40 as well.  Was started on iron  with vitamin C.    -2/27/2020 initial [facility] hematology consultation: He had dark tarry stools predating the institution of his current iron and extensive prior endoscopic work-up as outlined above.  I will check his methylmalonic acid and homocystine along with repeat B12 and folate and with his elevated C-reactive protein and sedimentation rate will check a serum immunoelectrophoresis and light chains.  Given his prior drinking history despite the fact that his liver enzymes have been normal I strongly suspect portal hypertension and I suspect the GI bleeding may be related to this but we will get records from .  If we do not get answers from this then we will proceed with bone marrow biopsy.  If there is a monoclonal gammopathy we will also proceed with bone marrow biopsy.  With his putative history of rheumatoid arthritis he could have splenomegaly with Felty syndrome as well and I will check his ROSARIO and rheumatoid factor.    -2/27/20 data:  Hgb 11.4 o/w normal CBC  Periph. Smear mild hypochromic anemia with mature WBC's    ZACKARY neg  Haptogb 213 high  Retic 2 %    Nl bili direct and indirect  Urine hemosiderin negative  Plasma free hemoglobin normal 7  G6PD 279 normal    ROSARIO neg  RF <10    Epo 22.4    Cr 1.6     nl  B12>2k  Homocysteine 10.5  Folate >20    Liver Spleen US:  Portal vein 1.7cm dilated  Smv Dilated 1.3 cm  But nl spleen size  Hepatomegaly with abnormal liver echogenicity.    Serum K 57.9, lambda 30.7, ratio 1.89.  Serum M spike zero    -5/26/2020 CT chest low-dose without contrast shows chronic granulomatous nodules lung RADS 2 with recommended annual follow-up.  Increased prominence of mucosal thickening distal esophagus compared to 2017 suggestive of esophagitis.    -7/17/2020 gastric angioma ectasias on EGD with Dr. Perez.  No esophageal abnormalities to corroborate with the above CT findings.    -7/27/2020 data:  White count 3600 with hemoglobin 11.6 platelets  137,000.  Creatinine 1.3 with GFR 65  Beta-2 microglobulin 3.2, upper limit 2.2  C-reactive protein 0.49/sedimentation rate 24  Serum immunoelectrophoresis showed no monoclonal spike  Serum free kappa light chains 52.8 with ratio of 2.25  Urine kappa light chains normal 41 with lambda normal 6 and ratio normal at 6  Bone survey essentially negative for myeloma save for a small minor posterior calvarial abnormality most likely venous lake.  Coincidental cardiomegaly with mild pulmonary vascular congestion.      -8/4/2020 data:   Sedimentation rate 37  White count 3100 hemoglobin 11.7 with platelets 115,000  Creatinine 1.27 with GFR greater than 60 and calcium 9.1 normal with normal total protein and albumin    -8/17/2020 hematology follow-up visit: Reviewed above data with patient.  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  Given persistence of monoclonal light chain with mild renal dysfunction even if the cranial abnormality is just a venous lake, I would still complete work-up with bone marrow biopsy to ensure no plasma cell dyscrasia, myelodysplasia, aplasia.    -8/20/2020 bone marrow biopsy showed mild hypercellularity 33% with out atypia.  No increase in plasma cells.  No T-cell or B-cell clonality on flow.  Normal cytogenetics.  Hemoglobin 11.3 with platelets 123,000.  Hence low likelihood for plasma cell dyscrasia or myelodysplasia.    -9/3/2020 hematology follow-up visit: Reviewed results of bone marrow biopsy.  No hint of myelodysplasia or plasma cell dyscrasia or myelophthisic process.  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  Suspect the small light chain clone is reactive and we will repeat CBC and myeloma  panel again in 6 months and if stable will go to annual.  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    -4/26/2021 [facility] [place] hemat[facility] oncology follow-up visit: I reviewed his 3/22/2021 data: White count 2810 with hemoglobin 11 with platelets 134,000 and absolute neutrophil count 1200.  CMP unremarkable with normal liver enzymes and creatinine 1.15 with normal calcium 9.0 and normal total protein and alkaline phosphatase.  No serum M spike and scant elevation of kappa light chain 37.7 with normal lambda all improved over the last year.  Normal quantitative immunoglobulins.  24-hour urine immunoelectrophoresis had no monoclonality.  Sedimentation rate 21 with C-reactive protein 0.91 both minimally elevated.  Ionized calcium just barely above normal 1.37.  Beta-2 microglobulin 3.0 stable since July.  The bulk of his pancytopenia is sequestered if portal hypertension.  No significant kappa light chains in the serum and he has gastric angio ectasias.  We will follow with Dr. Perez and I will repeat his M panel again in 1 year and if that is stable would probably discontinue routine M panel testing as I suspect this is just an inflammatory marker with rheumatoid arthritis.    Total time of care prior to visit reviewing interim data, during visit reviewing the data with him and translating and after visit making follow-up plans took 30 minutes of patient care time.    Yuval Herrera MD    04/26/2021

## 2021-04-27 LAB
ALBUMIN SERPL ELPH-MCNC: 3.5 G/DL (ref 2.9–4.4)
ALBUMIN/GLOB SERPL: 1.3 {RATIO} (ref 0.7–1.7)
ALPHA1 GLOB SERPL ELPH-MCNC: 0.2 G/DL (ref 0–0.4)
ALPHA2 GLOB SERPL ELPH-MCNC: 0.7 G/DL (ref 0.4–1)
B-GLOBULIN SERPL ELPH-MCNC: 0.9 G/DL (ref 0.7–1.3)
GAMMA GLOB SERPL ELPH-MCNC: 1.1 G/DL (ref 0.4–1.8)
GLOBULIN SER-MCNC: 2.9 G/DL (ref 2.2–3.9)
IGA SERPL-MCNC: 162 MG/DL (ref 61–437)
IGG SERPL-MCNC: 1316 MG/DL (ref 603–1613)
IGM SERPL-MCNC: 29 MG/DL (ref 15–143)
INTERPRETATION SERPL IEP-IMP: NORMAL
KAPPA LC FREE SER-MCNC: 59 MG/L (ref 3.3–19.4)
KAPPA LC FREE/LAMBDA FREE SER: 2.2 {RATIO} (ref 0.26–1.65)
LABORATORY COMMENT REPORT: NORMAL
LAMBDA LC FREE SERPL-MCNC: 26.8 MG/L (ref 5.7–26.3)
M PROTEIN SERPL ELPH-MCNC: NORMAL G/DL
PROT SERPL-MCNC: 6.4 G/DL (ref 6–8.5)

## 2021-05-26 ENCOUNTER — TRANSCRIBE ORDERS (OUTPATIENT)
Dept: ADMINISTRATIVE | Facility: HOSPITAL | Age: 76
End: 2021-05-26

## 2021-05-26 DIAGNOSIS — K21.9 CHRONIC GERD: Primary | ICD-10-CM

## 2021-06-02 ENCOUNTER — TRANSCRIBE ORDERS (OUTPATIENT)
Dept: ADMINISTRATIVE | Facility: HOSPITAL | Age: 76
End: 2021-06-02

## 2021-06-02 DIAGNOSIS — R13.12 OROPHARYNGEAL DYSPHAGIA: Primary | ICD-10-CM

## 2021-06-06 ENCOUNTER — APPOINTMENT (OUTPATIENT)
Dept: PREADMISSION TESTING | Facility: HOSPITAL | Age: 76
End: 2021-06-06

## 2021-06-06 PROCEDURE — C9803 HOPD COVID-19 SPEC COLLECT: HCPCS

## 2021-06-06 PROCEDURE — U0004 COV-19 TEST NON-CDC HGH THRU: HCPCS

## 2021-06-07 LAB — SARS-COV-2 RNA NOSE QL NAA+PROBE: NOT DETECTED

## 2021-06-08 ENCOUNTER — HOSPITAL ENCOUNTER (OUTPATIENT)
Dept: GENERAL RADIOLOGY | Facility: HOSPITAL | Age: 76
Discharge: HOME | End: 2021-06-08
Admitting: INTERNAL MEDICINE

## 2021-06-08 DIAGNOSIS — R13.13 PHARYNGEAL DYSPHAGIA: ICD-10-CM

## 2021-06-08 PROCEDURE — A9270 NON-COVERED ITEM OR SERVICE: HCPCS | Performed by: INTERNAL MEDICINE

## 2021-06-08 PROCEDURE — 74230 X-RAY XM SWLNG FUNCJ C+: CPT

## 2021-06-08 PROCEDURE — 92611 MOTION FLUOROSCOPY/SWALLOW: CPT

## 2021-06-08 PROCEDURE — 63710000001 BARIUM SULFATE 40 % PASTE: Performed by: INTERNAL MEDICINE

## 2021-06-08 PROCEDURE — 63710000001 BARIUM SULFATE 40 % RECONSTITUTED SUSPENSION: Performed by: INTERNAL MEDICINE

## 2021-06-08 RX ADMIN — BARIUM SULFATE 100 ML: 0.81 POWDER, FOR SUSPENSION ORAL at 10:48

## 2021-06-08 RX ADMIN — BARIUM SULFATE 20 ML: 400 PASTE ORAL at 10:48

## 2021-06-08 NOTE — MBS/VFSS/FEES
Outpatient Speech Language Pathology   Adult Swallow Initial Evaluation   [facility]   Modified Barium Swallow Study (MBS)     Patient Name: Miguel Camejo  : 1945  MRN: [id number]  Today's Date: 2021         Visit Date: 2021   Patient Active Problem List   Diagnosis   • Pancytopenia (CMS/HCC)        Past Medical History:   Diagnosis Date   • Arthritis    • Asthma    • Bowel trouble    • Chondrocalcinosis of right knee    • Colitis    • COPD (chronic obstructive pulmonary disease) (CMS/HCC)    • Diabetes mellitus (CMS/HCC)    • Esophagitis    • Gout    • H/O bladder problems    • Heart murmur    • Hypertension    • IBS (irritable bowel syndrome)    • JONAH on CPAP    • Primary osteoarthritis of both knees    • Rheumatoid arthritis (CMS/HCC)    • Skin problem    • SOB (shortness of breath)         Past Surgical History:   Procedure Laterality Date   • COLONOSCOPY     • KNEE ARTHROSCOPY Left    • PARTIAL KNEE ARTHROPLASTY Left    • SKIN BIOPSY     • STOMACH SURGERY     • UPPER GASTROINTESTINAL ENDOSCOPY     • VASECTOMY           Visit Dx:     ICD-10-CM ICD-9-CM   1. Pharyngeal dysphagia  R13.13 787.23           SLP Adult Swallow Evaluation     Row Name 21 1030       Rehab Evaluation    Document Type  evaluation  -RD    Subjective Information  no complaints  -RD    Patient Observations  alert;cooperative;agree to therapy  -RD    Patient/Family/Caregiver Comments/Observations  No family present  -RD    Care Plan Review  evaluation/treatment results reviewed;care plan/treatment goals reviewed;risks/benefits reviewed;current/potential barriers reviewed;patient/other agree to care plan  -RD    Patient Effort  excellent  -RD       General Information    Patient Profile Reviewed  yes  -RD    Pertinent History Of Current Problem  Pt present for OP MBS 2' c/o swallowing difficulty. Pt reports sensation of coating on tongue, but also reports hx of oral yeast infections requiring meds. Pt c/o sensation  "of \"debris collecting in throat\" & reports that this sensation is worse w/ solids. Pt also reports chronic throat clearing. PMH significant for pancytopenia w/ combo of anemia renal dz, SOB, RA, osteoarthritis, IBS, HTN, heart murmur, esophagitis, DM, COPD, & colitis. Pt w/ a negative COVID-19 test 6/6/21 prior to procedure.   -RD    Current Method of Nutrition  regular textures;thin liquids  -RD    Precautions/Limitations, Vision  WFL with corrective lenses;for purposes of eval  -RD    Precautions/Limitations, Hearing  WFL;for purposes of eval  -RD    Prior Level of Function-Communication  WFL  -RD    Prior Level of Function-Swallowing  no diet consistency restrictions;esophageal concerns;other (see comments) c/o swallowing difficulty  -RD    Plans/Goals Discussed with  patient;agreed upon  -RD    Barriers to Rehab  none identified  -RD    Patient's Goals for Discharge  eat/drink without coughing/choking  -RD       Pain    Additional Documentation  Pain Scale: Numbers Pre/Post-Treatment (Group)  -RD       Pain Scale: Numbers Pre/Post-Treatment    Pretreatment Pain Rating  0/10 - no pain  -RD    Posttreatment Pain Rating  0/10 - no pain  -RD       Oral Motor Structure and Function    Oral Lesions or Structural Abnormalities and/or variants  Pt noted w/ white/brown coating on lingual surface and reports that taste has been affected recently. Pt may benefit from further discussion w/ MD to determine if oral thrush is present.   -RD       MBS/VFSS    Utensils Used  spoon;cup;straw  -RD    Consistencies Trialed  thin liquids;pudding thick;regular textures  -RD       MBS/VFSS Interpretation    Oral Phase, Comment  Functional oral phase of the swallow. Pt noted w/ limited dentition, so mastication slightly prolonged, but adequate for regular solids.   -RD       Initiation of Pharyngeal Swallow    Initiation of Pharyngeal Swallow  bolus in pyriform sinuses  -RD    Rosenbek's Scale  thin:;2--->level 2;pudding/puree:;regular " textures:;1--->level 1  -RD    Attempted Compensatory Maneuvers  bolus size;bolus presentation style;additional subsequent swallow;throat clear after swallow  -RD    Response to Attempted Compensatory Maneuvers  prevented aspiration;other (see comments) w/ cued throat clearing  -RD    Pharyngeal Phase, Comment  Mild pharyngeal dysphagia. Penetration inconsistently during the swallow w/ thin liquids 2' delayed initiation and reduced vestibular closure. Pt able to clear penetration spontaneously w/ single sips, but not w/ consecutive sips. Cued throat clearing was successful to clear penetrated material w/ consecutive drinks of thin liquids and decrease aspiration risk. No immediate aspiration observed during exam. Mild base of tongue & posterior pharyngeal wall residue w/ all consistencies tested 2' reduced BOT retraction & decr'd pharyngeal stripping wave. Pt able to reduce and mostly clear w/ cued or spontaneous secondary swallow. Minimal pyriform sinus residue noted, suspect this was d/t retrograde flow of material.   -RD       Esophageal Phase    Esophageal Phase  esophageal retention with retrograde flow through PES;other (see comments)  -RD    Esophageal Phase, Comment  minimal retrograde flow into pharynx observed that contributed to pyriform sinus residue. Pt would benefit from general reflux precautions.   -RD       Clinical Impression    SLP Swallowing Diagnosis  mild;pharyngeal dysphagia;esophageal dysphagia  -RD    Functional Impact  risk of aspiration/pneumonia  -RD    Rehab Potential/Prognosis, Swallowing  good, to achieve stated therapy goals  -RD    Swallow Criteria for Skilled Therapeutic Interventions Met  demonstrates skilled criteria  -RD       Recommendations    Therapy Frequency (Swallow)  evaluation only  -RD    Predicted Duration Therapy Intervention (Days)  4 weeks- pt would benefit from outpatient SLP dysphagia treatment to address general pharyngeal strengthening during treatment (MD to  order outpatient SLP if in agreement) -RD    SLP Diet Recommendation  regular textures;thin liquids  -RD    Recommended Precautions and Strategies  upright posture during/after eating;small bites of food and sips of liquid;volitional throat clear throughout meals (mainly with thin liquids);general aspiration precautions;reflux precautions  -RD    Oral Care Recommendations  Oral Care BID/PRN  -RD    SLP Rec. for Method of Medication Administration  meds whole;with pudding or applesauce;as tolerated  -RD    Monitor for Signs of Aspiration  yes;notify SLP if any concerns  -RD    Anticipated Discharge Disposition (SLP)  unknown;anticipate therapy at next level of care  -RD    Demonstrates Need for Referral to Another Service  gastroenterology;other (see comments) if further esophageal concerns  -RD      User Key  (r) = Recorded By, (t) = Taken By, (c) = Cosigned By    Initials Name Provider Type    Fátima Bradley MS CCC-SLP Speech and Language Pathologist                        OP SLP Education     Row Name 06/08/21 1340       Education    Barriers to Learning  No barriers identified  -RD    Assessed  Learning needs;Learning motivation;Learning preferences;Learning readiness  -RD    Learning Motivation  Strong  -RD    Learning Method  Explanation;Teach back  -RD    Teaching Response  Verbalized understanding  -RD      User Key  (r) = Recorded By, (t) = Taken By, (c) = Cosigned By    Initials Name Effective Dates    Fátima Bradley MS CCC-SLP 04/03/18 -                      Time Calculation:   SLP Start Time: 1030  Untimed Charges  SLP Eval/Re-eval : ST [facility] Fluoro Eval Swallow - 73821  [id number] Motion Fluoro Eval Swallow Minutes: 65  Total Minutes  Untimed Charges Total Minutes: 65   Total Minutes: 65    Therapy Charges for Today     Code Description Service Date Service Provider Modifiers Qty    [id number]  ST MOTION FLUORO EVAL SWALLOW 4 6/8/2021 Fátima Fuchs MS CCC-SLP GN 1             Patient was not wearing a face mask and did exhibit coughing during this therapy encounter.  Procedure performed was aerosolizing, involved close contact (within 6 feet for at least 15 minutes or longer), and did not involve contact with infectious secretions or specimens.  Therapist used appropriate personal protective equipment including gloves, standard procedure mask and eye protection.  Appropriate PPE was worn during the entire therapy session.  Hand hygiene was completed before and after therapy session.         Fátima Fuchs MS CCC-SLP  6/8/2021

## 2021-07-04 ENCOUNTER — APPOINTMENT (OUTPATIENT)
Dept: PREADMISSION TESTING | Facility: HOSPITAL | Age: 76
End: 2021-07-04

## 2021-07-04 PROCEDURE — U0004 COV-19 TEST NON-CDC HGH THRU: HCPCS

## 2021-07-04 PROCEDURE — C9803 HOPD COVID-19 SPEC COLLECT: HCPCS

## 2021-07-05 LAB — SARS-COV-2 RNA NOSE QL NAA+PROBE: NOT DETECTED

## 2021-07-06 ENCOUNTER — APPOINTMENT (OUTPATIENT)
Dept: PREADMISSION TESTING | Facility: HOSPITAL | Age: 76
End: 2021-07-06

## 2021-07-06 ENCOUNTER — TELEPHONE (OUTPATIENT)
Dept: ONCOLOGY | Facility: CLINIC | Age: 76
End: 2021-07-06

## 2021-07-06 DIAGNOSIS — D61.818 PANCYTOPENIA (HCC): ICD-10-CM

## 2021-07-06 DIAGNOSIS — R80.3 FREE MONOCLONAL LIGHT CHAIN: ICD-10-CM

## 2021-07-06 DIAGNOSIS — D61.818 PANCYTOPENIA (HCC): Primary | ICD-10-CM

## 2021-07-06 LAB — SARS-COV-2 RNA PNL SPEC NAA+PROBE: NOT DETECTED

## 2021-07-06 PROCEDURE — 36415 COLL VENOUS BLD VENIPUNCTURE: CPT

## 2021-07-06 PROCEDURE — U0004 COV-19 TEST NON-CDC HGH THRU: HCPCS

## 2021-07-06 PROCEDURE — C9803 HOPD COVID-19 SPEC COLLECT: HCPCS

## 2021-07-06 NOTE — TELEPHONE ENCOUNTER
Caller: MAURIZIO    Relationship: [employer]    Best call back number: [phone]    What was the call regarding: MAURIZIO CALLED BECAUSE SHE SAYS SOMEONE OVER THERE OPENED UP THE ORDERS FOR THE PATIENT'S SED RATE. SHE SAYS THEY DON'T DO THOSE OVER THERE AND SHE NEEDS TO KNOW WHAT SHE SHOULD DO TO MAKE SURE THE ORDERS DON'T GET MESSED UP. PLEASE CALL TO ADVISE    Do you require a callback: YES

## 2021-07-07 ENCOUNTER — HOSPITAL ENCOUNTER (OUTPATIENT)
Dept: NUCLEAR MEDICINE | Facility: HOSPITAL | Age: 76
Discharge: HOME | End: 2021-07-07

## 2021-07-07 ENCOUNTER — HOSPITAL ENCOUNTER (OUTPATIENT)
Dept: GENERAL RADIOLOGY | Facility: HOSPITAL | Age: 76
Discharge: HOME | End: 2021-07-07
Admitting: SURGERY

## 2021-07-07 DIAGNOSIS — K21.9 CHRONIC GERD: ICD-10-CM

## 2021-07-07 PROCEDURE — 78264 GASTRIC EMPTYING IMG STUDY: CPT

## 2021-07-07 PROCEDURE — A9541 TC99M SULFUR COLLOID: HCPCS | Performed by: SURGERY

## 2021-07-07 PROCEDURE — 74220 X-RAY XM ESOPHAGUS 1CNTRST: CPT

## 2021-07-07 PROCEDURE — 0 TECHNETIUM SULFUR COLLOID: Performed by: SURGERY

## 2021-07-07 PROCEDURE — 63710000001 BARIUM SULFATE 96 % RECONSTITUTED SUSPENSION: Performed by: SURGERY

## 2021-07-07 PROCEDURE — A9270 NON-COVERED ITEM OR SERVICE: HCPCS | Performed by: SURGERY

## 2021-07-07 RX ADMIN — TECHNETIUM TC 99M SULFUR COLLOID 1 DOSE: KIT at 09:03

## 2021-07-07 RX ADMIN — BARIUM SULFATE 183 ML: 960 POWDER, FOR SUSPENSION ORAL at 12:49

## 2021-07-09 PROCEDURE — 88305 TISSUE EXAM BY PATHOLOGIST: CPT | Performed by: SURGERY

## 2021-07-12 ENCOUNTER — LAB REQUISITION (OUTPATIENT)
Dept: LAB | Facility: HOSPITAL | Age: 76
End: 2021-07-12

## 2021-07-12 DIAGNOSIS — K21.9 GASTRO-ESOPHAGEAL REFLUX DISEASE WITHOUT ESOPHAGITIS: ICD-10-CM

## 2021-07-13 LAB
CYTO UR: NORMAL
LAB AP CASE REPORT: NORMAL
LAB AP CLINICAL INFORMATION: NORMAL
PATH REPORT.FINAL DX SPEC: NORMAL
PATH REPORT.GROSS SPEC: NORMAL

## 2022-05-02 ENCOUNTER — LAB (OUTPATIENT)
Dept: LAB | Facility: HOSPITAL | Age: 77
End: 2022-05-02

## 2022-05-02 ENCOUNTER — OFFICE VISIT (OUTPATIENT)
Dept: ONCOLOGY | Facility: CLINIC | Age: 77
End: 2022-05-02

## 2022-05-02 VITALS
WEIGHT: 244 LBS | HEIGHT: 69 IN | SYSTOLIC BLOOD PRESSURE: 123 MMHG | DIASTOLIC BLOOD PRESSURE: 57 MMHG | TEMPERATURE: 97.6 F | OXYGEN SATURATION: 91 % | BODY MASS INDEX: 36.14 KG/M2 | RESPIRATION RATE: 18 BRPM | HEART RATE: 71 BPM

## 2022-05-02 DIAGNOSIS — D61.818 PANCYTOPENIA: Primary | ICD-10-CM

## 2022-05-02 DIAGNOSIS — D61.818 PANCYTOPENIA: ICD-10-CM

## 2022-05-02 LAB
ALBUMIN SERPL-MCNC: 3.5 G/DL (ref 3.5–5.2)
ALBUMIN/GLOB SERPL: 1.1 G/DL
ALP SERPL-CCNC: 62 U/L (ref 39–117)
ALT SERPL W P-5'-P-CCNC: 9 U/L (ref 1–41)
ANION GAP SERPL CALCULATED.3IONS-SCNC: 9 MMOL/L (ref 5–15)
AST SERPL-CCNC: 18 U/L (ref 1–40)
B2 MICROGLOB SERPL-MCNC: 6.1 MG/L (ref 0.8–2.2)
BILIRUB SERPL-MCNC: 0.4 MG/DL (ref 0–1.2)
BUN SERPL-MCNC: 31 MG/DL (ref 8–23)
BUN/CREAT SERPL: 19.5 (ref 7–25)
CALCIUM SPEC-SCNC: 9 MG/DL (ref 8.6–10.5)
CHLORIDE SERPL-SCNC: 106 MMOL/L (ref 98–107)
CO2 SERPL-SCNC: 26 MMOL/L (ref 22–29)
CREAT SERPL-MCNC: 1.59 MG/DL (ref 0.76–1.27)
CRP SERPL-MCNC: 2.01 MG/DL (ref 0–0.5)
EGFRCR SERPLBLD CKD-EPI 2021: 44.4 ML/MIN/1.73
ERYTHROCYTE [DISTWIDTH] IN BLOOD BY AUTOMATED COUNT: 14 % (ref 12.3–15.4)
ERYTHROCYTE [SEDIMENTATION RATE] IN BLOOD: 27 MM/HR (ref 0–20)
GLOBULIN UR ELPH-MCNC: 3.1 GM/DL
GLUCOSE SERPL-MCNC: 147 MG/DL (ref 65–99)
HCT VFR BLD AUTO: 32.9 % (ref 37.5–51)
HGB BLD-MCNC: 10 G/DL (ref 13–17.7)
LYMPHOCYTES # BLD AUTO: 1.3 10*3/MM3 (ref 0.7–3.1)
LYMPHOCYTES NFR BLD AUTO: 44.4 % (ref 19.6–45.3)
MCH RBC QN AUTO: 27.3 PG (ref 26.6–33)
MCHC RBC AUTO-ENTMCNC: 30.4 G/DL (ref 31.5–35.7)
MCV RBC AUTO: 89.9 FL (ref 79–97)
MONOCYTES # BLD AUTO: 0.2 10*3/MM3 (ref 0.1–0.9)
MONOCYTES NFR BLD AUTO: 8.4 % (ref 5–12)
NEUTROPHILS NFR BLD AUTO: 1.4 10*3/MM3 (ref 1.7–7)
NEUTROPHILS NFR BLD AUTO: 47.2 % (ref 42.7–76)
PLATELET # BLD AUTO: 129 10*3/MM3 (ref 140–450)
PMV BLD AUTO: 7 FL (ref 6–12)
POTASSIUM SERPL-SCNC: 4 MMOL/L (ref 3.5–5.2)
PROT SERPL-MCNC: 6.6 G/DL (ref 6–8.5)
RBC # BLD AUTO: 3.66 10*6/MM3 (ref 4.14–5.8)
SODIUM SERPL-SCNC: 141 MMOL/L (ref 136–145)
WBC NRBC COR # BLD: 2.9 10*3/MM3 (ref 3.4–10.8)

## 2022-05-02 PROCEDURE — 86334 IMMUNOFIX E-PHORESIS SERUM: CPT

## 2022-05-02 PROCEDURE — 82232 ASSAY OF BETA-2 PROTEIN: CPT

## 2022-05-02 PROCEDURE — 99213 OFFICE O/P EST LOW 20 MIN: CPT | Performed by: NURSE PRACTITIONER

## 2022-05-02 PROCEDURE — 83521 IG LIGHT CHAINS FREE EACH: CPT

## 2022-05-02 PROCEDURE — 86140 C-REACTIVE PROTEIN: CPT

## 2022-05-02 PROCEDURE — 85025 COMPLETE CBC W/AUTO DIFF WBC: CPT

## 2022-05-02 PROCEDURE — 85652 RBC SED RATE AUTOMATED: CPT

## 2022-05-02 PROCEDURE — 36415 COLL VENOUS BLD VENIPUNCTURE: CPT

## 2022-05-02 PROCEDURE — 82784 ASSAY IGA/IGD/IGG/IGM EACH: CPT

## 2022-05-02 PROCEDURE — 80053 COMPREHEN METABOLIC PANEL: CPT

## 2022-05-02 PROCEDURE — 84165 PROTEIN E-PHORESIS SERUM: CPT

## 2022-05-02 RX ORDER — BACLOFEN 5 MG/1
5 TABLET ORAL 3 TIMES DAILY
COMMUNITY
Start: 2022-04-14 | End: 2023-03-08 | Stop reason: HOSPADM

## 2022-05-02 RX ORDER — OXYBUTYNIN CHLORIDE 5 MG/1
TABLET, EXTENDED RELEASE ORAL
Status: ON HOLD | COMMUNITY
Start: 2022-04-29 | End: 2023-03-06

## 2022-05-02 RX ORDER — IPRATROPIUM BROMIDE 21 UG/1
SPRAY, METERED NASAL
Status: ON HOLD | COMMUNITY
Start: 2022-03-17 | End: 2023-03-06

## 2022-05-02 NOTE — PROGRESS NOTES
CHIEF COMPLAINT: Follow-up pancytopenia    Problem List:  Oncology/Hematology History Overview Note   1.  Pancytopenia with combination of anemia renal disease, possible sequestration with the hepatomegaly/portal vein dilation/SMV dilation on February 2020 liver spleen ultrasound suggesting portal hypertension and possible contribution from the gastric vascular ectasias on EGD  elevation of serum kappa and lambda light chains and gastric angioectasias on 7/17/2020 EGD  2.  Chronic low back pain   3.  Chronic kidney disease  4.  Type 2 diabetes  5.  Hyperlipidemia  6.  Hypertension  7.  Hypogonadism  8.  Depression  9. COPD  10.  Obstructive sleep apnea  11.  Reflux   12.  Gout  13 osteoarthritis  14.  BPH with ED  15.  BMI 36  16.  Putative history of rheumatoid arthritis  17.  Chronic granulomatous lung nodules on CT chest screening with routine follow-up recommended  18.  Scant elevation of serum light chains without intact monoclonal gammopathy and no plasma cell dyscrasia on bone marrow biopsy August 2020    Hematology history:  -Longstanding history of heme positive stools dating back to the late 80s with at least 5 colonoscopies by Dr. Perez including the last 1/2018 as well as EGDs and a capsule endoscopy that apparently showed scattered blood that they could not do much about.In the [facility] system we have hemoglobins of 9.8 and platelet of 121,000 with white count 4880 as of June 2016 and December 2015 white count 3020 with platelets 126,000 hemoglobin 11.4.  Does have a history of heavy alcohol use but none in the past several years.  -8/7/2019 Hemoccult negative  -1/30/2020 reticulocyte count 1.4%.  B12 618.  Iron slightly low 48.  White count 2700 with hemoglobin 11.6 MCV 88 and normal red cell distribution width with platelets 124,000.  Normal thyroid functions.  -2/4/2020 white count 3100 hemoglobin 11.7 with normal MCV and red cell distribution with platelets 126,000 normal liver enzymes and bilirubin  including fractionation.  Creatinine 1.36 GFR greater than 60 with normal calcium 9.1.  .  C-reactive protein 9.67 with sedimentation rate elevated at 40 as well.  Was started on iron with vitamin C.    -2/27/2020 initial [facility] hematology consultation: He had dark tarry stools predating the institution of his current iron and extensive prior endoscopic work-up as outlined above.  I will check his methylmalonic acid and homocystine along with repeat B12 and folate and with his elevated C-reactive protein and sedimentation rate will check a serum immunoelectrophoresis and light chains.  Given his prior drinking history despite the fact that his liver enzymes have been normal I strongly suspect portal hypertension and I suspect the GI bleeding may be related to this but we will get records from .  If we do not get answers from this then we will proceed with bone marrow biopsy.  If there is a monoclonal gammopathy we will also proceed with bone marrow biopsy.  With his putative history of rheumatoid arthritis he could have splenomegaly with Felty syndrome as well and I will check his ROSARIO and rheumatoid factor.    -2/27/20 data:  Hgb 11.4 o/w normal CBC  Periph. Smear mild hypochromic anemia with mature WBC's    ZACKARY neg  Haptogb 213 high  Retic 2 %    Nl bili direct and indirect  Urine hemosiderin negative  Plasma free hemoglobin normal 7  G6PD 279 normal    ROSARIO neg  RF <10    Epo 22.4    Cr 1.6     nl  B12>2k  Homocysteine 10.5  Folate >20    Liver Spleen US:  Portal vein 1.7cm dilated  Smv Dilated 1.3 cm  But nl spleen size  Hepatomegaly with abnormal liver echogenicity.    Serum K 57.9, lambda 30.7, ratio 1.89.  Serum M spike zero    -5/26/2020 CT chest low-dose without contrast shows chronic granulomatous nodules lung RADS 2 with recommended annual follow-up.  Increased prominence of mucosal thickening distal esophagus compared to 2017 suggestive of esophagitis.    -7/17/2020 gastric angioma  ectasias on EGD with Dr. Perez.  No esophageal abnormalities to corroborate with the above CT findings.    -7/27/2020 data:  White count 3600 with hemoglobin 11.6 platelets 137,000.  Creatinine 1.3 with GFR 65  Beta-2 microglobulin 3.2, upper limit 2.2  C-reactive protein 0.49/sedimentation rate 24  Serum immunoelectrophoresis showed no monoclonal spike  Serum free kappa light chains 52.8 with ratio of 2.25  Urine kappa light chains normal 41 with lambda normal 6 and ratio normal at 6  Bone survey essentially negative for myeloma save for a small minor posterior calvarial abnormality most likely venous lake.  Coincidental cardiomegaly with mild pulmonary vascular congestion.      -8/4/2020 data:   Sedimentation rate 37  White count 3100 hemoglobin 11.7 with platelets 115,000  Creatinine 1.27 with GFR greater than 60 and calcium 9.1 normal with normal total protein and albumin    -8/17/2020 hematology follow-up visit: Reviewed above data with patient.  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  Given persistence of monoclonal light chain with mild renal dysfunction even if the cranial abnormality is just a venous lake, I would still complete work-up with bone marrow biopsy to ensure no plasma cell dyscrasia, myelodysplasia, aplasia.    -8/20/2020 bone marrow biopsy showed mild hypercellularity 33% with out atypia.  No increase in plasma cells.  No T-cell or B-cell clonality on flow.  Normal cytogenetics.  Hemoglobin 11.3 with platelets 123,000.  Hence low likelihood for plasma cell dyscrasia or myelodysplasia.    -9/3/2020 hematology follow-up visit: Reviewed results of bone marrow biopsy.  No hint of myelodysplasia or plasma cell dyscrasia or myelophthisic process.  Doubt the above previously  mentioned cranial abnormality is significant and likely a benign venous lake in my opinion.  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    -4/26/2021 [facility], [place], operated by [facility] hematology oncology follow-up visit: I reviewed his 3/22/2021 data: White count 2810 with hemoglobin 11 with platelets 134,000 and absolute neutrophil count 1200.  CMP unremarkable with normal liver enzymes and creatinine 1.15 with normal calcium 9.0 and normal total protein and alkaline phosphatase.  No serum M spike and scant elevation of kappa light chain 37.7 with normal lambda all improved over the last year.  Normal quantitative immunoglobulins.  24-hour urine immunoelectrophoresis had no monoclonality.  Sedimentation rate 21 with C-reactive protein 0.91 both minimally elevated.  Ionized calcium just barely above normal 1.37.  Beta-2 microglobulin 3.0 stable since July.  The bulk of his pancytopenia is sequestered if portal hypertension.  No significant kappa light chains in the serum and he has gastric angio ectasias.  We will follow with Dr. Perez and I will repeat his M panel again in 1 year and if that is stable would probably discontinue routine M panel testing as I suspect this is just an inflammatory marker with rheumatoid arthritis.                   Pancytopenia (HCC)   2/26/2020 Initial Diagnosis    Pancytopenia (CMS/HCC)         HISTORY OF PRESENT ILLNESS:  The patient is a 77 y.o. male, here for follow up on management of follow-up pancytopenia.  Mr. Camejo has been in his usual state of health this past year.  He denies any significant illnesses, no hospitalizations.      Past Medical History:   Diagnosis Date   • Arthritis    • Asthma    • Bowel trouble    • Chondrocalcinosis of right knee    • Colitis    • COPD (chronic obstructive  "pulmonary disease) (HCC)    • Diabetes mellitus (HCC)    • Esophagitis    • Gout    • H/O bladder problems    • Heart murmur    • Hypertension    • IBS (irritable bowel syndrome)    • JONAH on CPAP    • Primary osteoarthritis of both knees    • Rheumatoid arthritis (HCC)    • Skin problem    • SOB (shortness of breath)      Past Surgical History:   Procedure Laterality Date   • COLONOSCOPY     • KNEE ARTHROSCOPY Left    • PARTIAL KNEE ARTHROPLASTY Left    • SKIN BIOPSY     • STOMACH SURGERY     • UPPER GASTROINTESTINAL ENDOSCOPY     • VASECTOMY         Allergies   Allergen Reactions   • Lisinopril Swelling   • Benazepril Swelling       Family History and Social History reviewed and changed as necessary    REVIEW OF SYSTEM:   No new complaints    PHYSICAL EXAM:  Lungs clear.  Heart regular rate.      Vitals:    05/02/22 1022 05/02/22 1030   BP: 123/57    Pulse: 71    Resp: 18    Temp: 97.6 °F (36.4 °C)    SpO2: (!) 84% 91%   Weight: 111 kg (244 lb)    Height: 175.3 cm (69\")      Vitals:    05/02/22 1022   PainSc: 0-No pain          ECOG score: 1           Vitals reviewed.      ASSESSMENT & PLAN:  1.  Pancytopenia with combination of anemia renal disease, possible sequestration with the hepatomegaly/portal vein dilation/SMV dilation on February 2020 liver spleen ultrasound suggesting portal hypertension and possible contribution from the gastric vascular ectasias on EGD  elevation of serum kappa and lambda light chains and gastric angioectasias on 7/17/2020 EGD  2.  Chronic low back pain   3.  Chronic kidney disease  4.  Type 2 diabetes  5.  Hyperlipidemia  6.  Hypertension  7.  Hypogonadism  8.  Depression  9. COPD  10.  Obstructive sleep apnea  11.  Reflux   12.  Gout  13 osteoarthritis  14.  BPH with ED  15.  BMI 36  16.  Putative history of rheumatoid arthritis  17.  Chronic granulomatous lung nodules on CT chest screening with routine follow-up recommended  18.  Scant elevation of serum light chains without intact " monoclonal gammopathy and no plasma cell dyscrasia on bone marrow biopsy August 2020    Hematology history:  -Longstanding history of heme positive stools dating back to the late 80s with at least 5 colonoscopies by Dr. Perez including the last 1/2018 as well as EGDs and a capsule endoscopy that apparently showed scattered blood that they could not do much about.In the [facility] system we have hemoglobins of 9.8 and platelet of 121,000 with white count 4880 as of June 2016 and December 2015 white count 3020 with platelets 126,000 hemoglobin 11.4.  Does have a history of heavy alcohol use but none in the past several years.  -8/7/2019 Hemoccult negative  -1/30/2020 reticulocyte count 1.4%.  B12 618.  Iron slightly low 48.  White count 2700 with hemoglobin 11.6 MCV 88 and normal red cell distribution width with platelets 124,000.  Normal thyroid functions.  -2/4/2020 white count 3100 hemoglobin 11.7 with normal MCV and red cell distribution with platelets 126,000 normal liver enzymes and bilirubin including fractionation.  Creatinine 1.36 GFR greater than 60 with normal calcium 9.1.  .  C-reactive protein 9.67 with sedimentation rate elevated at 40 as well.  Was started on iron with vitamin C.    -2/27/2020 initial [facility] hematology consultation: He had dark tarry stools predating the institution of his current iron and extensive prior endoscopic work-up as outlined above.  I will check his methylmalonic acid and homocystine along with repeat B12 and folate and with his elevated C-reactive protein and sedimentation rate will check a serum immunoelectrophoresis and light chains.  Given his prior drinking history despite the fact that his liver enzymes have been normal I strongly suspect portal hypertension and I suspect the GI bleeding may be related to this but we will get records from .  If we do not get answers from this then we will proceed with bone marrow biopsy.  If there is a monoclonal gammopathy we  will also proceed with bone marrow biopsy.  With his putative history of rheumatoid arthritis he could have splenomegaly with Felty syndrome as well and I will check his ROSARIO and rheumatoid factor.    -2/27/20 data:  Hgb 11.4 o/w normal CBC  Periph. Smear mild hypochromic anemia with mature WBC's    ZACKARY neg  Haptogb 213 high  Retic 2 %    Nl bili direct and indirect  Urine hemosiderin negative  Plasma free hemoglobin normal 7  G6PD 279 normal    ROSARIO neg  RF <10    Epo 22.4    Cr 1.6     nl  B12>2k  Homocysteine 10.5  Folate >20    Liver Spleen US:  Portal vein 1.7cm dilated  Smv Dilated 1.3 cm  But nl spleen size  Hepatomegaly with abnormal liver echogenicity.    Serum K 57.9, lambda 30.7, ratio 1.89.  Serum M spike zero    -5/26/2020 CT chest low-dose without contrast shows chronic granulomatous nodules lung RADS 2 with recommended annual follow-up.  Increased prominence of mucosal thickening distal esophagus compared to 2017 suggestive of esophagitis.    -7/17/2020 gastric angioma ectasias on EGD with Dr. Perez.  No esophageal abnormalities to corroborate with the above CT findings.    -7/27/2020 data:  White count 3600 with hemoglobin 11.6 platelets 137,000.  Creatinine 1.3 with GFR 65  Beta-2 microglobulin 3.2, upper limit 2.2  C-reactive protein 0.49/sedimentation rate 24  Serum immunoelectrophoresis showed no monoclonal spike  Serum free kappa light chains 52.8 with ratio of 2.25  Urine kappa light chains normal 41 with lambda normal 6 and ratio normal at 6  Bone survey essentially negative for myeloma save for a small minor posterior calvarial abnormality most likely venous lake.  Coincidental cardiomegaly with mild pulmonary vascular congestion.      -8/4/2020 data:   Sedimentation rate 37  White count 3100 hemoglobin 11.7 with platelets 115,000  Creatinine 1.27 with GFR greater than 60 and calcium 9.1 normal with normal total protein and albumin    -8/17/2020 hematology follow-up visit:  Reviewed above data with patient.  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  Given persistence of monoclonal light chain with mild renal dysfunction even if the cranial abnormality is just a venous lake, I would still complete work-up with bone marrow biopsy to ensure no plasma cell dyscrasia, myelodysplasia, aplasia.    -8/20/2020 bone marrow biopsy showed mild hypercellularity 33% with out atypia.  No increase in plasma cells.  No T-cell or B-cell clonality on flow.  Normal cytogenetics.  Hemoglobin 11.3 with platelets 123,000.  Hence low likelihood for plasma cell dyscrasia or myelodysplasia.    -9/3/2020 hematology follow-up visit: Reviewed results of bone marrow biopsy.  No hint of myelodysplasia or plasma cell dyscrasia or myelophthisic process.  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    -4/26/2021 [facility] hematology oncology follow-up visit: I reviewed his 3/22/2021 data: White count 2810 with hemoglobin 11 with platelets 134,000 and absolute neutrophil count 1200.  CMP unremarkable with normal liver enzymes and creatinine 1.15 with normal calcium 9.0 and normal total protein and alkaline phosphatase.  No serum M spike and scant elevation of kappa light chain 37.7 with normal lambda all improved over the last year.  Normal quantitative immunoglobulins.  24-hour urine immunoelectrophoresis had no monoclonality.  Sedimentation rate 21 with C-reactive protein 0.91 both  minimally elevated.  Ionized calcium just barely above normal 1.37.  Beta-2 microglobulin 3.0 stable since July.  The bulk of his pancytopenia is sequestered if portal hypertension.  No significant kappa light chains in the serum and he has gastric angio ectasias.  We will follow with Dr. Perez and I will repeat his M panel again in 1 year and if that is stable would probably discontinue routine M panel testing as I suspect this is just an inflammatory marker with rheumatoid arthritis.    -5/2/2022 [facility] Hematology clinic follow-up: Unfortunately Mr. Camejo did not get his labs prior to our appointment today.  We will check his M panel again today (serum free light chains, serum immunoelectrophoresis) along with CBC, CMP, sed rate and CRP.  Assuming he still has no abnormal protein and his CBC is stable then we will discontinue routine hematological follow-up as recommended at previous visit with Dr. Herrera on 4/26/2021.  I will call him later this week with his lab results from today.    No routine follow-up, return to hematology clinic as needed.  Follow with primary care provider, Dr. Cuong Hairston.    I spent 20 minutes caring for Miguel on this date of service. This time includes time spent by me in the following activities: preparing for the visit, obtaining and/or reviewing a separately obtained history, performing a medically appropriate examination and/or evaluation, ordering medications, tests, or procedures and documenting information in the medical record.     Francoise Clement, APRN    05/02/2022

## 2022-05-03 LAB
ALBUMIN SERPL ELPH-MCNC: 3.3 G/DL (ref 2.9–4.4)
ALBUMIN/GLOB SERPL: 1.1 {RATIO} (ref 0.7–1.7)
ALPHA1 GLOB SERPL ELPH-MCNC: 0.3 G/DL (ref 0–0.4)
ALPHA2 GLOB SERPL ELPH-MCNC: 0.9 G/DL (ref 0.4–1)
B-GLOBULIN SERPL ELPH-MCNC: 0.7 G/DL (ref 0.7–1.3)
GAMMA GLOB SERPL ELPH-MCNC: 1.2 G/DL (ref 0.4–1.8)
GLOBULIN SER-MCNC: 3.1 G/DL (ref 2.2–3.9)
IGA SERPL-MCNC: 165 MG/DL (ref 61–437)
IGG SERPL-MCNC: 1363 MG/DL (ref 603–1613)
IGM SERPL-MCNC: 31 MG/DL (ref 15–143)
INTERPRETATION SERPL IEP-IMP: NORMAL
KAPPA LC FREE SER-MCNC: 52.4 MG/L (ref 3.3–19.4)
KAPPA LC FREE/LAMBDA FREE SER: 1.89 {RATIO} (ref 0.26–1.65)
LABORATORY COMMENT REPORT: NORMAL
LAMBDA LC FREE SERPL-MCNC: 27.7 MG/L (ref 5.7–26.3)
M PROTEIN SERPL ELPH-MCNC: NORMAL G/DL
PROT SERPL-MCNC: 6.4 G/DL (ref 6–8.5)

## 2022-05-13 ENCOUNTER — TELEPHONE (OUTPATIENT)
Dept: ONCOLOGY | Facility: CLINIC | Age: 77
End: 2022-05-13

## 2022-05-13 NOTE — TELEPHONE ENCOUNTER
I reviewed labs with Dr. Herrera, no M-spike, labs stable.  Will see PRN. I called to let Mr. Camejo know his labs were stable and he will follow up prn.  No answer, left vm.

## 2022-07-29 ENCOUNTER — TRANSCRIBE ORDERS (OUTPATIENT)
Dept: SPEECH THERAPY | Facility: HOSPITAL | Age: 77
End: 2022-07-29

## 2022-07-29 DIAGNOSIS — R13.12 OROPHARYNGEAL DYSPHAGIA: Primary | ICD-10-CM

## 2022-08-01 ENCOUNTER — HOSPITAL ENCOUNTER (OUTPATIENT)
Dept: SPEECH THERAPY | Facility: HOSPITAL | Age: 77
Setting detail: THERAPIES SERIES
Discharge: HOME | End: 2022-08-01

## 2022-08-01 DIAGNOSIS — R13.19 ESOPHAGEAL DYSPHAGIA: Primary | ICD-10-CM

## 2022-08-01 PROCEDURE — 92610 EVALUATE SWALLOWING FUNCTION: CPT

## 2022-08-01 NOTE — THERAPY EVALUATION
Outpatient Speech Language Pathology   Adult Swallow Initial Evaluation  [facility]     Patient Name: Miguel Camejo  : 1945  MRN: [id number]  Today's Date: 2022         Visit Date: 2022   Patient Active Problem List   Diagnosis   • Pancytopenia (HCC)        Past Medical History:   Diagnosis Date   • Arthritis    • Asthma    • Bowel trouble    • Chondrocalcinosis of right knee    • Colitis    • COPD (chronic obstructive pulmonary disease) (HCC)    • Diabetes mellitus (HCC)    • Esophagitis    • Gout    • H/O bladder problems    • Heart murmur    • Hypertension    • IBS (irritable bowel syndrome)    • JONAH on CPAP    • Primary osteoarthritis of both knees    • Rheumatoid arthritis (HCC)    • Skin problem    • SOB (shortness of breath)         Past Surgical History:   Procedure Laterality Date   • COLONOSCOPY     • KNEE ARTHROSCOPY Left    • PARTIAL KNEE ARTHROPLASTY Left    • SKIN BIOPSY     • STOMACH SURGERY     • UPPER GASTROINTESTINAL ENDOSCOPY     • VASECTOMY           Visit Dx:     ICD-10-CM ICD-9-CM   1. Esophageal dysphagia  R13.19 787.29            OP SLP Assessment/Plan - 22 1400        SLP Assessment    Functional Problems Swallowing  -HG    Impact on Function: Swallowing Risk of aspiration;Risk of pneumonia;Impact on social aspects of eating  -HG    Clinical Impression: Swallowing esophageal dysphagia  -HG    Functional Problems Comment Pt reports feeling food at the end of meals.  -HG    Clinical Impression Comments MASA score of 193/200 and RSI of 33  -HG    Please refer to paper survey for additional self-reported information Yes  -HG    Please refer to items scanned into chart for additional diagnostic informaiton and handouts as provided by clinician Yes  -HG    SLP Diagnosis Esophageal dysphagia  -HG    Prognosis Excellent (comment)  -HG    Patient/caregiver participated in establishment of treatment plan and goals Yes  -HG    Patient would benefit from skilled therapy  intervention Yes  -HG       SLP Plan    Frequency 1x/week  -HG    Duration 8 weeks  -HG    Planned CPT's? SLP CLINICAL SWALLOW EVAL: 14509;SLP SWALLOW THERAPY: 11952  -HG    Expected Duration of Therapy Session (SLP Eval) 60  -HG    Plan Comments Initiate dysphagia tx.  -HG          User Key  (r) = Recorded By, (t) = Taken By, (c) = Cosigned By    Initials Name Provider Type     Obdulia Suggs MS CCC-SLP Speech and Language Pathologist                 SLP Adult Swallow Evaluation     Row Name 08/01/22 1400       Rehab Evaluation    Document Type evaluation  -HG    Subjective Information no complaints  -HG    Patient Observations alert;cooperative;agree to therapy  -HG    Patient/Family/Caregiver Comments/Observations No family present. Pt reports dealing with this for over a year now.  -HG    Patient Effort excellent  -HG    Symptoms Noted During/After Treatment none  -HG       General Information    Patient Profile Reviewed yes  -HG    Pertinent History Of Current Problem Pt is a 77 year old male with hx of GERD (meds include, baclofen,  famotidine and omeprazole). Pt with recent EGD (awaiting report). Pt with MBS in June of 2021 with no aspiration exhibited- please see formal report. Pt with mild esophageal dysmotlity as well as mild to moderate GERD and a sliding type hiatal hernia.  -HG    Current Method of Nutrition regular textures;thin liquids  -HG    Precautions/Limitations, Vision corrective lenses needed for reading  -HG    Precautions/Limitations, Hearing WFL;for purposes of eval  -HG    Prior Level of Function-Communication WFL  -HG    Prior Level of Function-Swallowing esophageal concerns  -HG    Plans/Goals Discussed with patient  -HG    Patient's Goals for Discharge --  At end of meal to not have to cough or clear throat.  -HG       Oral Motor Structure and Function    Dentition Assessment upper dentures/partial in place;lower dentures/partial in place  -HG    Secretion Management WNL/WFL  -HG  "   Mucosal Quality moist, healthy  -HG    Gag Response WFL  -HG    Volitional Swallow WFL  -HG    Volitional Cough WFL  -HG       Oral Musculature and Cranial Nerve Assessment    Oral Motor General Assessment WFL  -HG       General Eating/Swallowing Observations    Respiratory Support Currently in Use room air  -HG    Eating/Swallowing Skills self-fed  -HG    Positioning During Eating upright in chair  -HG    Utensils Used spoon;cup;straw  -HG    Consistencies Trialed regular textures;mixed consistency;pureed;thin liquids  -HG       Respiratory    Respiratory Status WFL;room air  -HG       Clinical Swallow Eval    Oral Prep Phase WFL  -HG    Oral Transit WFL  -HG    Oral Residue WFL  -HG    Pharyngeal Phase suspected pharyngeal impairment  -HG    Esophageal Phase suspected esophageal impairment  -HG    Clinical Swallow Evaluation Summary CSE completed this date. Pt tolerated all trials with no overt s/s of aspiration: water via cup and straw, applesauce, peaches and chris cracker. After the peach bite, pt felt it \"caught\" on the right side and it resulted in multiple throat clears and pt took a drink to help clear. Pt proceeded with chris cracker with no difficulties. Delayed cough present and pt stated that it felt like he needed to clear his throat. SLP RECs continue with current diet w/ refluex precautions in place with possible MBS w/ limited upper GI.  -HG       Pharyngeal Phase Concerns    Pharyngeal Phase Concerns cough;throat clear  -HG    Cough mechanical soft;regular consistencies  -HG    Throat Clear thin  -HG       Esophageal Phase Concerns    Esophageal Phase Concerns sensation of material sticking  -HG    Sensation of Material Sticking mechanical soft  -HG          User Key  (r) = Recorded By, (t) = Taken By, (c) = Cosigned By    Initials Name Provider Type     Obdulia Suggs MS CCC-SLP Speech and Language Pathologist                               OP SLP Education     Row Name 08/01/22 1400    "    Education    Barriers to Learning No barriers identified  -    Education Provided Described results of evaluation;Patient expressed understanding of evaluation;Patient participated in establishing goals and treatment plan;Patient demonstrated recommended strategies;Patient requires further education on strategies, risks  -    Assessed Learning needs;Learning motivation;Learning preferences;Learning readiness  -    Learning Motivation Strong  -    Learning Method Explanation;Demonstration;Teach back;Written materials  -    Teaching Response Verbalized understanding;Demonstrated understanding;Reinforcement needed  -    Education Comments Education regarding POC and reviewed relux precautions.  -          User Key  (r) = Recorded By, (t) = Taken By, (c) = Cosigned By    Initials Name Effective Dates    HG Obdulia Suggs MS CCC-SLP 06/16/21 -                SLP OP Goals     Row Name 08/01/22 1400          Goal Type Needed    Goal Type Needed Dysphagia;Other Adult Goals  -            Subjective Comments    Subjective Comments Pt alert, cooperative, reports current issue for over a year.  -HG            Dysphagia Goals    Patient will safely consume the recommended diet without complications such as aspiration pneumonia regular/thin  -HG     Status: Patient will safely consume the recommended diet without complications such as aspiration pneumonia New  -HG     Patient will compensate for oral/pharyngeal deficits and reduce risks while eating by utilizing  compensatory strategies slow rate;avoid liquid wash;with cues  -HG     Status: Patient will compensate for oral/pharyngeal deficits and reduce risks while eating by utilizing  compensatory strategies New  -HG            Other Goals    Other Adult Goal- 1 Pt will follow reflux precuations to decrease s/s of reflux with 90% accuracy.  -HG     Status: Other Adult Goal- 1 New  -HG     Other Adult Goal- 2 Pt will complete MBS w/limited upper GI as  deemed appropriate.  -HG     Status: Other Adult Goal- 2 New  -HG            SLP Time Calculation    SLP Goal Re-Cert Due Date 10/30/22  -HG           User Key  (r) = Recorded By, (t) = Taken By, (c) = Cosigned By    Initials Name Provider Type    Obdulia Baptiste MS CCC-SLP Speech and Language Pathologist                       Time Calculation:   SLP Start Time: 1400  Untimed Charges  86887-GC Eval Oral Pharyng Swallow Minutes: 75  Total Minutes  Untimed Charges Total Minutes: 75   Total Minutes: 75    Therapy Charges for Today     Code Description Service Date Service Provider Modifiers Qty    23010631095 HC ST EVAL ORAL PHARYNG SWALLOW 5 8/1/2022 Obdulia Suggs MS CCC-SLP GN 1                   Obdulia Suggs MS CCC-SLP  8/1/2022

## 2022-08-18 ENCOUNTER — HOSPITAL ENCOUNTER (OUTPATIENT)
Dept: SPEECH THERAPY | Facility: HOSPITAL | Age: 77
Setting detail: THERAPIES SERIES
Discharge: HOME | End: 2022-08-18

## 2022-08-18 DIAGNOSIS — R13.19 ESOPHAGEAL DYSPHAGIA: Primary | ICD-10-CM

## 2022-08-18 PROCEDURE — 92526 ORAL FUNCTION THERAPY: CPT

## 2022-08-18 NOTE — THERAPY TREATMENT NOTE
"Outpatient Speech Language Pathology   Adult Swallow Treatment Note  [facility]     Patient Name: Miguel Camejo  : 1945  MRN: [id number]  Today's Date: 2022         Visit Date: 2022   Patient Active Problem List   Diagnosis   • Pancytopenia (HCC)        Visit Dx:    ICD-10-CM ICD-9-CM   1. Esophageal dysphagia  R13.19 787.29        OP SLP Assessment/Plan - 22 0800        SLP Plan    Plan Comments Cont with Dysphagia tx and complete MBS w/ limited upper GI.  -HG          User Key  (r) = Recorded By, (t) = Taken By, (c) = Cosigned By    Initials Name Provider Type    Obdulia Baptiste MS CCC-SLP Speech and Language Pathologist                                   SLP OP Goals     Row Name 22          Goal Type Needed    Goal Type Needed Dysphagia;Other Adult Goals  -HG            Subjective Comments    Subjective Comments Pt alert, cooperative, returned with complaints of fluid coming up in his throat and he can't bring it up but coughs.  -HG            Dysphagia Goals    Patient will safely consume the recommended diet without complications such as aspiration pneumonia regular/thin  -HG     Status: Patient will safely consume the recommended diet without complications such as aspiration pneumonia Progressing as expected  -HG     Comments: Patient will safely consume the recommended diet without complications such as aspiration pneumonia 22: Pt had a donut this morning and feels that a piece of it is still in his throat. Pt describes that there are \"rough spots and something hangs on it.\"  -HG     Patient will compensate for oral/pharyngeal deficits and reduce risks while eating by utilizing  compensatory strategies slow rate;avoid liquid wash;with cues  -HG     Status: Patient will compensate for oral/pharyngeal deficits and reduce risks while eating by utilizing  compensatory strategies Progressing as expected  -HG     Comments: Patient will compensate for " oral/pharyngeal deficits and reduce risks while eating by utilizing  compensatory strategies 8/18/22: Pt states he is trying to eat a slower rate. Increased education for slow rate and even use of a timer.  -HG            Other Goals    Other Adult Goal- 1 Pt will follow reflux precuations to decrease s/s of reflux with 90% accuracy.  -HG     Status: Other Adult Goal- 1 Progressing as expected  -HG     Comments: Other Adult Goal- 1 8/18/22: Pt continues to try to follow reflux precautions. Pt reports taking possibly 4 different oral medications and will chew a tums at times.  -HG     Other Adult Goal- 2 Pt will complete MBS w/limited upper GI as deemed appropriate.  -HG     Status: Other Adult Goal- 2 Progressing as expected  -HG     Comments: Other Adult Goal- 2 8/18/22: Ordered this date.  -HG            SLP Time Calculation    SLP Goal Re-Cert Due Date 10/30/22  -HG           User Key  (r) = Recorded By, (t) = Taken By, (c) = Cosigned By    Initials Name Provider Type    Obdulia Baptiste MS CCC-SLP Speech and Language Pathologist               OP SLP Education     Row Name 08/18/22 0800       Education    Education Comments Education for reflux precautions and plan of care to repeat MBS w/limited upper GI.  -HG          User Key  (r) = Recorded By, (t) = Taken By, (c) = Cosigned By    Initials Name Effective Dates    Obdulia Baptiste MS CCC-SLP 06/16/21 -                     Time Calculation:   SLP Start Time: 0800  Untimed Charges  13673-WG Treatment Swallow Minutes: 30  Total Minutes  Untimed Charges Total Minutes: 30   Total Minutes: 30    Therapy Charges for Today     Code Description Service Date Service Provider Modifiers Qty    20747648547 HC ST TREATMENT SWALLOW 2 8/18/2022 Obdulia Suggs MS CCC-SLP GN 1                   Obdulia Suggs MS CCC-SLP  8/18/2022

## 2022-09-01 ENCOUNTER — APPOINTMENT (OUTPATIENT)
Dept: SPEECH THERAPY | Facility: HOSPITAL | Age: 77
End: 2022-09-01

## 2022-09-14 ENCOUNTER — APPOINTMENT (OUTPATIENT)
Dept: GENERAL RADIOLOGY | Facility: HOSPITAL | Age: 77
End: 2022-09-14

## 2022-09-15 ENCOUNTER — APPOINTMENT (OUTPATIENT)
Dept: SPEECH THERAPY | Facility: HOSPITAL | Age: 77
End: 2022-09-15

## 2022-09-20 ENCOUNTER — HOSPITAL ENCOUNTER (OUTPATIENT)
Dept: GENERAL RADIOLOGY | Facility: HOSPITAL | Age: 77
Discharge: HOME | End: 2022-09-20

## 2022-09-20 DIAGNOSIS — R13.19 ESOPHAGEAL DYSPHAGIA: ICD-10-CM

## 2022-09-20 DIAGNOSIS — R13.10 DYSPHAGIA, UNSPECIFIED TYPE: Primary | ICD-10-CM

## 2022-09-20 PROCEDURE — 74230 X-RAY XM SWLNG FUNCJ C+: CPT

## 2022-09-20 PROCEDURE — 63710000001 BARIUM SULFATE 40 % RECONSTITUTED SUSPENSION: Performed by: INTERNAL MEDICINE

## 2022-09-20 PROCEDURE — 74240 X-RAY XM UPR GI TRC 1CNTRST: CPT

## 2022-09-20 PROCEDURE — A9270 NON-COVERED ITEM OR SERVICE: HCPCS | Performed by: INTERNAL MEDICINE

## 2022-09-20 PROCEDURE — 63710000001 BARIUM SULFATE 40 % PASTE: Performed by: INTERNAL MEDICINE

## 2022-09-20 PROCEDURE — 92611 MOTION FLUOROSCOPY/SWALLOW: CPT

## 2022-09-20 RX ADMIN — BARIUM SULFATE 100 ML: 0.81 POWDER, FOR SUSPENSION ORAL at 11:35

## 2022-09-20 RX ADMIN — BARIUM SULFATE 20 ML: 400 PASTE ORAL at 11:35

## 2022-09-20 NOTE — MBS/VFSS/FEES
Outpatient Speech Language Pathology   Adult Swallow Initial Evaluation   [facility]   Modified Barium Swallow Study (MBS)     Patient Name: Miguel Camejo  : 1945  MRN: [id number]  Today's Date: 2022         Visit Date: 2022   Patient Active Problem List   Diagnosis   • Pancytopenia (HCC)        Past Medical History:   Diagnosis Date   • Arthritis    • Asthma    • Bowel trouble    • Chondrocalcinosis of right knee    • Colitis    • COPD (chronic obstructive pulmonary disease) (HCC)    • Diabetes mellitus (HCC)    • Esophagitis    • Gout    • H/O bladder problems    • Heart murmur    • Hypertension    • IBS (irritable bowel syndrome)    • JONAH on CPAP    • Primary osteoarthritis of both knees    • Rheumatoid arthritis (HCC)    • Skin problem    • SOB (shortness of breath)         Past Surgical History:   Procedure Laterality Date   • COLONOSCOPY     • KNEE ARTHROSCOPY Left    • PARTIAL KNEE ARTHROPLASTY Left    • SKIN BIOPSY     • STOMACH SURGERY     • UPPER GASTROINTESTINAL ENDOSCOPY     • VASECTOMY           Visit Dx:     ICD-10-CM ICD-9-CM   1. Dysphagia, unspecified type  R13.10 787.20   2. Esophageal dysphagia  R13.19 787.29            OP SLP Assessment/Plan - 22 1313        SLP Plan    Plan Comments Patient plans to keep next appointment with treating outpatient SLP. May benefit from further education/suggestions for managing esophageal dysphagia and possible xerostomia.  -AC          User Key  (r) = Recorded By, (t) = Taken By, (c) = Cosigned By    Initials Name Provider Type    AC Miranda Clements MS CCC-SLP Speech and Language Pathologist                 SLP Adult Swallow Evaluation     Row Name 22 1115       Rehab Evaluation    Document Type evaluation  -AC    Subjective Information no complaints  -    Patient Observations alert;cooperative  -AC    Patient/Family/Caregiver Comments/Observations No family present.  -AC    Patient Effort good  -AC       General Information  "   Patient Profile Reviewed yes  -AC    Pertinent History Of Current Problem Referred for modified barium swallow study (MBS) and esophageal screen due to complaints of dysphagia. Patient reported ongoing swallowing issues for at least a year, where certain foods will leave a sensation of leaving \"debris\" in his throat and he has great difficulty clearing this. He feels as though his throat is dry. MBS completed 6/2021 revealed mild pharyngeal swallow, but no aspiration. Patient with known hx esophageal dysphagia, including reflux and HH.  -AC    Current Method of Nutrition regular textures;thin liquids  -AC    Precautions/Limitations, Vision WFL;for purposes of eval  -AC    Precautions/Limitations, Hearing WFL;for purposes of eval  -AC    Plans/Goals Discussed with patient;agreed upon  -AC    Barriers to Rehab none identified  -AC       Pain    Additional Documentation Pain Scale: FACES Pre/Post-Treatment (Group)  -AC       Pain Scale: FACES Pre/Post-Treatment    Pain: FACES Scale, Pretreatment 0-->no hurt  -AC    Posttreatment Pain Rating 0-->no hurt  -AC       Oral Motor Structure and Function    Dentition Assessment natural, present and adequate  -AC    Secretion Management WNL/WFL  -AC       General Eating/Swallowing Observations    Respiratory Support Currently in Use room air  -AC    Eating/Swallowing Skills self-fed;appropriate self-feeding skills observed  -AC    Positioning During Eating upright 90 degree;upright in chair  -AC       MBS/VFSS    Utensils Used spoon;cup;straw  -AC    Consistencies Trialed thin liquids;pudding thick;regular textures  trialed donut provided by patient  -AC       MBS/VFSS Interpretation    Oral Prep Phase WFL  -AC    Oral Transit Phase WFL  -AC    Oral Residue WFL  -AC       Initiation of Pharyngeal Swallow    Initiation of Pharyngeal Swallow bolus in pyriform sinuses  -AC    Pharyngeal Phase impaired pharyngeal phase of swallowing  -AC    Penetration Before the Swallow thin " liquids;secondary to delayed swallow initiation or mistiming  -    Response to Penetration shallow;transient  -    Rosenbek's Scale thin:;2--->level 2;pudding/puree:;regular textures:;1--->level 1  -    Pharyngeal Phase, Comment Grossly functional oropharyngeal swallow. There was transient penetration intermittently with thin liquid. Penetrated material expelled laryngeal vestibule with completion of the swallow. No penetration with other consistencies trialed and no aspiration appreciated during study. No significant pharyngeal residue. Discussed possible reasons for sensation of food sticking, including: esophageal dysphagia (though not observed during this study, has been determine via previous studies) and xerostomia. Provided handouts/education re: reflux and dry mouth management.  -AC       Esophageal Phase    Esophageal Phase no impairments;see radiology report for further details  -       SLP Evaluation Clinical Impression    SLP Swallowing Diagnosis functional oral phase;functional pharyngeal phase  -       Recommendations    SLP Diet Recommendation regular textures;thin liquids;other (see comments)  consider adding extra sauce/gravies to added moisture to dry foods  -    Recommended Precautions and Strategies upright posture during/after eating;general aspiration precautions;reflux precautions;other (see comments)  consider utilizing oral moisture-enhancing techniques/products prior to meals  -    Oral Care Recommendations Oral Care BID/PRN  -AC    SLP Rec. for Method of Medication Administration meds whole;with thin liquids;with pudding or applesauce;as tolerated  -          User Key  (r) = Recorded By, (t) = Taken By, (c) = Cosigned By    Initials Name Provider Type     Miranda Clements MS CCC-SLP Speech and Language Pathologist                 OP SLP Education     Row Name 09/20/22 3459       Education    Barriers to Learning No barriers identified  -    Education Provided Described  results of evaluation;Patient expressed understanding of evaluation  -    Assessed Learning needs;Learning motivation;Learning preferences;Learning readiness  -    Learning Motivation Strong  -    Learning Method Explanation;Written materials  -    Teaching Response Verbalized understanding  -          User Key  (r) = Recorded By, (t) = Taken By, (c) = Cosigned By    Initials Name Effective Dates    AC Miranda Clements MS CCC-SLP 06/16/21 -               Time Calculation:   SLP Start Time: 1115  Untimed Charges  39306-EX Motion Fluoro Eval Swallow Minutes: 71  Total Minutes  Untimed Charges Total Minutes: 71   Total Minutes: 71    Therapy Charges for Today     Code Description Service Date Service Provider Modifiers Qty    87761489849 HC ST MOTION FLUORO EVAL SWALLOW 5 9/20/2022 Miranda Clements MS CCC-SLP GN 1            Patient was not wearing a face mask and did exhibit coughing during this therapy encounter.  Procedure performed was aerosolizing, involved close contact (within 6 feet for at least 15 minutes or longer), and did not involve contact with infectious secretions or specimens.  Therapist used appropriate personal protective equipment including gloves, standard procedure mask and eye protection.  Appropriate PPE was worn during the entire therapy session.  Hand hygiene was completed before and after therapy session.           Miranda Clements MS CCC-SLP  9/20/2022

## 2022-09-23 ENCOUNTER — HOSPITAL ENCOUNTER (OUTPATIENT)
Dept: SPEECH THERAPY | Facility: HOSPITAL | Age: 77
Setting detail: THERAPIES SERIES
Discharge: HOME | End: 2022-09-23

## 2022-09-23 DIAGNOSIS — R13.19 ESOPHAGEAL DYSPHAGIA: Primary | ICD-10-CM

## 2022-09-23 PROCEDURE — 92526 ORAL FUNCTION THERAPY: CPT

## 2022-09-23 NOTE — THERAPY PROGRESS REPORT/RE-CERT
"Outpatient Speech Language Pathology   Adult Swallow Progress Note  [facility]     Patient Name: Miguel Camejo  : 1945  MRN: [id number]  Today's Date: 2022         Visit Date: 2022   Patient Active Problem List   Diagnosis   • Pancytopenia (HCC)        Visit Dx:    ICD-10-CM ICD-9-CM   1. Esophageal dysphagia  R13.19 787.29        OP SLP Assessment/Plan - 22 0814        SLP Assessment    Functional Problems Swallowing  -HG    Impact on Function: Swallowing Impact on social aspects of eating  -HG    Clinical Impression: Swallowing esophageal dysphagia  -HG    Functional Problems Comment Pt reports decreased episodes of repeated swallowing.  -HG    SLP Diagnosis Esophageal dysphagia  -HG       SLP Plan    Frequency 1x/week every 2-3 weeks.  -HG    Plan Comments Cont with Dysphagia tx.  -HG          User Key  (r) = Recorded By, (t) = Taken By, (c) = Cosigned By    Initials Name Provider Type     Obdulia Suggs MS CCC-SLP Speech and Language Pathologist                                   SLP OP Goals     Row Name 2214          Goal Type Needed    Goal Type Needed Dysphagia;Other Adult Goals  -HG            Subjective Comments    Subjective Comments Pt alert, cooperative,  -HG            Dysphagia Goals    Patient will safely consume the recommended diet without complications such as aspiration pneumonia regular/thin  -HG     Status: Patient will safely consume the recommended diet without complications such as aspiration pneumonia Progressing as expected  -HG     Comments: Patient will safely consume the recommended diet without complications such as aspiration pneumonia 22: Pt continues to eat and drink without difficulty, no choking. Pt reports dryness of throat and it makes him wake up and start coughing. Pt educated on xyimelts this date. 22: Pt had a donut this morning and feels that a piece of it is still in his throat. Pt describes that there are \"rough " "spots and something hangs on it.\"  -HG     Patient will compensate for oral/pharyngeal deficits and reduce risks while eating by utilizing  compensatory strategies slow rate;avoid liquid wash;with cues  -HG     Status: Patient will compensate for oral/pharyngeal deficits and reduce risks while eating by utilizing  compensatory strategies Progressing as expected  -HG     Comments: Patient will compensate for oral/pharyngeal deficits and reduce risks while eating by utilizing  compensatory strategies 9/23/22: Pt reports eats less at a time but more meals/snacks. Pt alternates liquids  8/18/22: Pt states he is trying to eat a slower rate. Increased education for slow rate and even use of a timer.  -HG            Other Goals    Other Adult Goal- 1 Pt will follow reflux precuations to decrease s/s of reflux with 90% accuracy.  -HG     Status: Other Adult Goal- 1 Progressing as expected  -HG     Comments: Other Adult Goal- 1 9/23/22: Pt reports that his symptoms of reflux are not as prominent. He doesn't have to swallow as many times (2 times vs 4 times).  8/18/22: Pt continues to try to follow reflux precautions. Pt reports taking possibly 4 different oral medications and will chew a tums at times.  -HG     Other Adult Goal- 2 Pt will complete MBS w/limited upper GI as deemed appropriate.  -HG     Status: Other Adult Goal- 2 Achieved  -HG     Comments: Other Adult Goal- 2 9/23/22: Grossly functional oropharyngeal swallow. There was transient penetration intermittently with thin liquid. Penetrated material expelled laryngeal vestibule with completion of the swallow. No penetration with other consistencies trialed and no aspiration appreciated during study. Please see formal report. Reflux precautions were again reviewed post MBS by evaluating therapist.  8/18/22: Ordered this date.  -HG            SLP Time Calculation    SLP Goal Re-Cert Due Date 10/30/22  -HG           User Key  (r) = Recorded By, (t) = Taken By, (c) = " Cosigned By    Initials Name Provider Type    Obdulia Baptiste MS CCC-SLP Speech and Language Pathologist               OP SLP Education     Row Name 09/23/22 0814       Education    Education Comments Reviewed reflux precautions and provided handout on xyimelts for dry mouth.  -          User Key  (r) = Recorded By, (t) = Taken By, (c) = Cosigned By    Initials Name Effective Dates    Obdulia Baptiste MS CCC-JOHN 06/16/21 -                     Time Calculation:   SLP Start Time: 0814  Untimed Charges  29929-IO Treatment Swallow Minutes: 30  Total Minutes  Untimed Charges Total Minutes: 30   Total Minutes: 30    Therapy Charges for Today     Code Description Service Date Service Provider Modifiers Qty    [id number] HC ST TREATMENT SWALLOW 2 9/23/2022 Obdulia Suggs MS CCC-SLP GN 1                   Obdulia Suggs MS CCC-JOHN  9/23/2022

## 2022-10-17 ENCOUNTER — DOCUMENTATION (OUTPATIENT)
Dept: SPEECH THERAPY | Facility: HOSPITAL | Age: 77
End: 2022-10-17

## 2022-10-17 DIAGNOSIS — R13.19 ESOPHAGEAL DYSPHAGIA: Primary | ICD-10-CM

## 2022-10-17 NOTE — THERAPY DISCHARGE NOTE
"Speech Language Pathology Discharge Summary         Patient Name: Miguel Camejo  : 1945  MRN: [id number]    Today's Date: 10/17/2022       SLP OP Goals     Row Name 10/17/22 1000          Goal Type Needed    Goal Type Needed Dysphagia;Other Adult Goals  -HG        Subjective Comments    Subjective Comments Pt seen for eval and 2 tx sessions with completion of MBS. Pt wishes to D/C at this time and noted slight improvement in his swallowing/reflux precuations.  -HG        Dysphagia Goals    Patient will safely consume the recommended diet without complications such as aspiration pneumonia regular/thin  -HG     Status: Patient will safely consume the recommended diet without complications such as aspiration pneumonia Progressing as expected  -HG     Comments: Patient will safely consume the recommended diet without complications such as aspiration pneumonia 22: Pt continues to eat and drink without difficulty, no choking. Pt reports dryness of throat and it makes him wake up and start coughing. Pt educated on xyimelts this date. 22: Pt had a donut this morning and feels that a piece of it is still in his throat. Pt describes that there are \"rough spots and something hangs on it.\"  -HG     Patient will compensate for oral/pharyngeal deficits and reduce risks while eating by utilizing  compensatory strategies slow rate;avoid liquid wash;with cues  -HG     Status: Patient will compensate for oral/pharyngeal deficits and reduce risks while eating by utilizing  compensatory strategies Progressing as expected  -HG     Comments: Patient will compensate for oral/pharyngeal deficits and reduce risks while eating by utilizing  compensatory strategies 22: Pt reports eats less at a time but more meals/snacks. Pt alternates liquids  22: Pt states he is trying to eat a slower rate. Increased education for slow rate and even use of a timer.  -HG        Other Goals    Other Adult Goal- 1 Pt will " follow reflux precuations to decrease s/s of reflux with 90% accuracy.  -HG     Status: Other Adult Goal- 1 Progressing as expected  -HG     Comments: Other Adult Goal- 1 9/23/22: Pt reports that his symptoms of reflux are not as prominent. He doesn't have to swallow as many times (2 times vs 4 times).  8/18/22: Pt continues to try to follow reflux precautions. Pt reports taking possibly 4 different oral medications and will chew a tums at times.  -HG     Other Adult Goal- 2 Pt will complete MBS w/limited upper GI as deemed appropriate.  -HG     Status: Other Adult Goal- 2 Achieved  -HG     Comments: Other Adult Goal- 2 9/23/22: Grossly functional oropharyngeal swallow. There was transient penetration intermittently with thin liquid. Penetrated material expelled laryngeal vestibule with completion of the swallow. No penetration with other consistencies trialed and no aspiration appreciated during study. Please see formal report. Reflux precautions were again reviewed post MBS by evaluating therapist.  8/18/22: Ordered this date.  -        SLP Time Calculation    SLP Goal Re-Cert Due Date 10/30/22  -           User Key  (r) = Recorded By, (t) = Taken By, (c) = Cosigned By    Initials Name Provider Type     Obdulia Suggs MS CCC-SLP Speech and Language Pathologist                       Time Calculation:                    Obdulia Suggs MS CCC-SLP  10/17/2022

## 2023-02-24 ENCOUNTER — OFFICE VISIT (OUTPATIENT)
Dept: ONCOLOGY | Facility: CLINIC | Age: 78
End: 2023-02-24
Payer: MEDICARE

## 2023-02-24 VITALS
RESPIRATION RATE: 18 BRPM | BODY MASS INDEX: 36.88 KG/M2 | WEIGHT: 249 LBS | OXYGEN SATURATION: 92 % | DIASTOLIC BLOOD PRESSURE: 66 MMHG | SYSTOLIC BLOOD PRESSURE: 154 MMHG | TEMPERATURE: 98.3 F | HEIGHT: 69 IN | HEART RATE: 74 BPM

## 2023-02-24 DIAGNOSIS — D61.818 PANCYTOPENIA: Primary | ICD-10-CM

## 2023-02-24 DIAGNOSIS — K76.6 PORTAL HYPERTENSION: ICD-10-CM

## 2023-02-24 PROCEDURE — 99213 OFFICE O/P EST LOW 20 MIN: CPT | Performed by: INTERNAL MEDICINE

## 2023-02-24 RX ORDER — AMLODIPINE BESYLATE 5 MG/1
5 TABLET ORAL DAILY
COMMUNITY
Start: 2023-02-07 | End: 2023-03-08 | Stop reason: HOSPADM

## 2023-02-24 RX ORDER — EMPAGLIFLOZIN 10 MG/1
10 TABLET, FILM COATED ORAL DAILY
COMMUNITY
Start: 2023-02-09

## 2023-02-24 RX ORDER — FUROSEMIDE 40 MG/1
40 TABLET ORAL TAKE AS DIRECTED
COMMUNITY
Start: 2022-11-28 | End: 2023-03-08 | Stop reason: HOSPADM

## 2023-02-24 RX ORDER — POTASSIUM CHLORIDE 20 MEQ/1
1 TABLET, EXTENDED RELEASE ORAL DAILY
COMMUNITY
Start: 2023-01-25

## 2023-02-24 RX ORDER — HYDRALAZINE HYDROCHLORIDE 25 MG/1
50 TABLET, FILM COATED ORAL 2 TIMES DAILY
COMMUNITY
Start: 2023-01-31

## 2023-02-24 NOTE — PROGRESS NOTES
CHIEF COMPLAINT: Follow-up pancytopenia    Problem List:  Oncology/Hematology History Overview Note   1.  Pancytopenia with combination of anemia renal disease, possible sequestration with the hepatomegaly/portal vein dilation/SMV dilation on February 2020 liver spleen ultrasound suggesting portal hypertension and possible contribution from the gastric vascular ectasias on EGD  elevation of serum kappa and lambda light chains and gastric angioectasias on 7/17/2020 EGD  2.  Chronic low back pain   3.  Chronic kidney disease  4.  Type 2 diabetes  5.  Hyperlipidemia  6.  Hypertension  7.  Hypogonadism  8.  Depression  9. COPD  10.  Obstructive sleep apnea  11.  Reflux   12.  Gout  13 osteoarthritis  14.  BPH with ED  15.  BMI 36  16.  Putative history of rheumatoid arthritis  17.  Chronic granulomatous lung nodules on CT chest screening with routine follow-up recommended  18.  Scant elevation of serum light chains without intact monoclonal gammopathy and no plasma cell dyscrasia on bone marrow biopsy August 2020    Hematology history:  -Longstanding history of heme positive stools dating back to the late 80s with at least 5 colonoscopies by Dr. Perez including the last 1/2018 as well as EGDs and a capsule endoscopy that apparently showed scattered blood that they could not do much about.In the [facility] system we have hemoglobins of 9.8 and platelet of 121,000 with white count 4880 as of June 2016 and December 2015 white count 3020 with platelets 126,000 hemoglobin 11.4.  Does have a history of heavy alcohol use but none in the past several years.  -8/7/2019 Hemoccult negative  -1/30/2020 reticulocyte count 1.4%.  B12 618.  Iron slightly low 48.  White count 2700 with hemoglobin 11.6 MCV 88 and normal red cell distribution width with platelets 124,000.  Normal thyroid functions.  -2/4/2020 white count 3100 hemoglobin 11.7 with normal MCV and red cell distribution with platelets 126,000 normal liver enzymes and bilirubin  including fractionation.  Creatinine 1.36 GFR greater than 60 with normal calcium 9.1.  .  C-reactive protein 9.67 with sedimentation rate elevated at 40 as well.  Was started on iron with vitamin C.    -2/27/2020 initial [facility] hematology consultation: He had dark tarry stools predating the institution of his current iron and extensive prior endoscopic work-up as outlined above.  I will check his methylmalonic acid and homocystine along with repeat B12 and folate and with his elevated C-reactive protein and sedimentation rate will check a serum immunoelectrophoresis and light chains.  Given his prior drinking history despite the fact that his liver enzymes have been normal I strongly suspect portal hypertension and I suspect the GI bleeding may be related to this but we will get records from .  If we do not get answers from this then we will proceed with bone marrow biopsy.  If there is a monoclonal gammopathy we will also proceed with bone marrow biopsy.  With his putative history of rheumatoid arthritis he could have splenomegaly with Felty syndrome as well and I will check his ROSARIO and rheumatoid factor.    -2/27/20 data:  Hgb 11.4 o/w normal CBC  Periph. Smear mild hypochromic anemia with mature WBC's    ZACKARY neg  Haptogb 213 high  Retic 2 %    Nl bili direct and indirect  Urine hemosiderin negative  Plasma free hemoglobin normal 7  G6PD 279 normal    ROSARIO neg  RF <10    Epo 22.4    Cr 1.6     nl  B12>2k  Homocysteine 10.5  Folate >20    Liver Spleen US:  Portal vein 1.7cm dilated  Smv Dilated 1.3 cm  But nl spleen size  Hepatomegaly with abnormal liver echogenicity.    Serum K 57.9, lambda 30.7, ratio 1.89.  Serum M spike zero    -5/26/2020 CT chest low-dose without contrast shows chronic granulomatous nodules lung RADS 2 with recommended annual follow-up.  Increased prominence of mucosal thickening distal esophagus compared to 2017 suggestive of esophagitis.    -7/17/2020 gastric angioma  ectasias on EGD with Dr. Perez.  No esophageal abnormalities to corroborate with the above CT findings.    -7/27/2020 data:  White count 3600 with hemoglobin 11.6 platelets 137,000.  Creatinine 1.3 with GFR 65  Beta-2 microglobulin 3.2, upper limit 2.2  C-reactive protein 0.49/sedimentation rate 24  Serum immunoelectrophoresis showed no monoclonal spike  Serum free kappa light chains 52.8 with ratio of 2.25  Urine kappa light chains normal 41 with lambda normal 6 and ratio normal at 6  Bone survey essentially negative for myeloma save for a small minor posterior calvarial abnormality most likely venous lake.  Coincidental cardiomegaly with mild pulmonary vascular congestion.      -8/4/2020 data:   Sedimentation rate 37  White count 3100 hemoglobin 11.7 with platelets 115,000  Creatinine 1.27 with GFR greater than 60 and calcium 9.1 normal with normal total protein and albumin    -8/17/2020 hematology follow-up visit: Reviewed above data with patient.  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  Given persistence of monoclonal light chain with mild renal dysfunction even if the cranial abnormality is just a venous lake, I would still complete work-up with bone marrow biopsy to ensure no plasma cell dyscrasia, myelodysplasia, aplasia.    -8/20/2020 bone marrow biopsy showed mild hypercellularity 33% with out atypia.  No increase in plasma cells.  No T-cell or B-cell clonality on flow.  Normal cytogenetics.  Hemoglobin 11.3 with platelets 123,000.  Hence low likelihood for plasma cell dyscrasia or myelodysplasia.    -9/3/2020 hematology follow-up visit: Reviewed results of bone marrow biopsy.  No hint of myelodysplasia or plasma cell dyscrasia or myelophthisic process.  Doubt the above previously  mentioned cranial abnormality is significant and likely a benign venous lake in my opinion.  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    -4/26/2021 [facility] hematology oncology follow-up visit: I reviewed his 3/22/2021 data: White count 2810 with hemoglobin 11 with platelets 134,000 and absolute neutrophil count 1200.  CMP unremarkable with normal liver enzymes and creatinine 1.15 with normal calcium 9.0 and normal total protein and alkaline phosphatase.  No serum M spike and scant elevation of kappa light chain 37.7 with normal lambda all improved over the last year.  Normal quantitative immunoglobulins.  24-hour urine immunoelectrophoresis had no monoclonality.  Sedimentation rate 21 with C-reactive protein 0.91 both minimally elevated.  Ionized calcium just barely above normal 1.37.  Beta-2 microglobulin 3.0 stable since July.  The bulk of his pancytopenia is sequestered if portal hypertension.  No significant kappa light chains in the serum and he has gastric angio ectasias.  We will follow with Dr. Perez and I will repeat his M panel again in 1 year and if that is stable would probably discontinue routine M panel testing as I suspect this is just an inflammatory marker with rheumatoid arthritis.    -5/2/2022 [facility] Hematology clinic follow-up: Unfortunately Mr. Camejo did not get his labs prior to our appointment today.  We will check his M panel again today (serum free light chains, serum immunoelectrophoresis) along with CBC, CMP, sed rate and CRP.  Assuming he still has no abnormal protein and his CBC is stable then we will discontinue routine hematological follow-up as recommended at previous visit with Dr. Herrera on 4/26/2021.  I will call him later this week with his lab results from today.   Addendum: 5/13/2022 phone call: No m spike.  Labs stable fu prn.    - 2/24/2023 [facility] hematology follow-up: 8/3/2022 sedimentation rate 45 C-reactive protein 4.47.  White count 2400 hemoglobin 11.1 platelets 119,000 unremarkable BMP save for creatinine 1.41.  Normal total protein, albumin globulin and ratios.  Normal liver enzymes.  2/10/2023 White count 3500 hemoglobin 11 platelets 113,000 with unremarkable differential sedimentation rate 45.  Uric acid mildly elevated 7.2.  I have put in a referral back to Dr. 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  His blood counts overall have been stable for the better part of the last 9 months and he will see us on an as-needed basis.           Pancytopenia (HCC)   2/26/2020 Initial Diagnosis    Pancytopenia (CMS/HCC)         HISTORY OF PRESENT ILLNESS:  The patient is a 78 y.o. male, here for follow up on management of pancytopenia due to portal hypertension    Past Medical History:   Diagnosis Date   • Arthritis    • Asthma    • Bowel trouble    • Chondrocalcinosis of right knee    • Colitis    • COPD (chronic obstructive pulmonary disease) (HCC)    • Diabetes mellitus (HCC)    • Esophagitis    • Gout    • H/O bladder problems    • Heart murmur    • Hypertension    • IBS (irritable bowel syndrome)    • JONAH on CPAP    • Primary osteoarthritis of both knees    • Rheumatoid arthritis (HCC)    • Skin problem    • SOB (shortness of breath)      Past Surgical History:   Procedure Laterality Date   • COLONOSCOPY     • KNEE ARTHROSCOPY Left    • PARTIAL KNEE ARTHROPLASTY Left    • SKIN BIOPSY     • STOMACH SURGERY     • UPPER GASTROINTESTINAL ENDOSCOPY     • VASECTOMY         Allergies   Allergen Reactions   • Lisinopril Swelling   • Benazepril Swelling       Family History and Social History reviewed and changed as necessary    REVIEW OF SYSTEM:   No new somatic complaints    PHYSICAL  "EXAM:  Lungs clear.  No focal motor weaknesses.  Does walk with a cane.    Vitals:    02/24/23 1302   BP: 154/66   Pulse: 74   Resp: 18   Temp: 98.3 °F (36.8 °C)   SpO2: 92%   Weight: 113 kg (249 lb)   Height: 175.3 cm (69\")     Vitals:    02/24/23 1302   PainSc: 0-No pain          ECOG score: 1           Vitals reviewed.        Lab Results   Component Value Date    HGB 10.0 (L) 05/02/2022    HCT 32.9 (L) 05/02/2022    MCV 89.9 05/02/2022     (L) 05/02/2022    WBC 2.90 (L) 05/02/2022    NEUTROABS 1.40 (L) 05/02/2022    LYMPHSABS 1.30 05/02/2022    MONOSABS 0.20 05/02/2022    EOSABS 0.17 11/16/2021    BASOSABS 0.05 11/16/2021       Lab Results   Component Value Date    GLUCOSE 147 (H) 05/02/2022    BUN 31 (H) 05/02/2022    CREATININE 1.59 (H) 05/02/2022     05/02/2022    K 4.0 05/02/2022     05/02/2022    CO2 26.0 05/02/2022    CALCIUM 9.0 05/02/2022    PROTEINTOT 6.6 05/02/2022    ALBUMIN 3.50 05/02/2022    ALBUMIN 3.3 05/02/2022    BILITOT 0.4 05/02/2022    ALKPHOS 62 05/02/2022    AST 18 05/02/2022    ALT 9 05/02/2022             ASSESSMENT & PLAN:  1.  Pancytopenia with combination of anemia renal disease, possible sequestration with the hepatomegaly/portal vein dilation/SMV dilation on February 2020 liver spleen ultrasound suggesting portal hypertension and possible contribution from the gastric vascular ectasias on EGD  elevation of serum kappa and lambda light chains and gastric angioectasias on 7/17/2020 EGD  2.  Chronic low back pain   3.  Chronic kidney disease  4.  Type 2 diabetes  5.  Hyperlipidemia  6.  Hypertension  7.  Hypogonadism  8.  Depression  9. COPD  10.  Obstructive sleep apnea  11.  Reflux   12.  Gout  13 osteoarthritis  14.  BPH with ED  15.  BMI 36  16.  Putative history of rheumatoid arthritis  17.  Chronic granulomatous lung nodules on CT chest screening with routine follow-up recommended  18.  Scant elevation of serum light chains without intact monoclonal gammopathy " and no plasma cell dyscrasia on bone marrow biopsy August 2020    Hematology history:  -Longstanding history of heme positive stools dating back to the late 80s with at least 5 colonoscopies by Dr. Perez including the last 1/2018 as well as EGDs and a capsule endoscopy that apparently showed scattered blood that they could not do much about.In the [facility] system we have hemoglobins of 9.8 and platelet of 121,000 with white count 4880 as of June 2016 and December 2015 white count 3020 with platelets 126,000 hemoglobin 11.4.  Does have a history of heavy alcohol use but none in the past several years.  -8/7/2019 Hemoccult negative  -1/30/2020 reticulocyte count 1.4%.  B12 618.  Iron slightly low 48.  White count 2700 with hemoglobin 11.6 MCV 88 and normal red cell distribution width with platelets 124,000.  Normal thyroid functions.  -2/4/2020 white count 3100 hemoglobin 11.7 with normal MCV and red cell distribution with platelets 126,000 normal liver enzymes and bilirubin including fractionation.  Creatinine 1.36 GFR greater than 60 with normal calcium 9.1.  .  C-reactive protein 9.67 with sedimentation rate elevated at 40 as well.  Was started on iron with vitamin C.    -2/27/2020 initial [facility] hematology consultation: He had dark tarry stools predating the institution of his current iron and extensive prior endoscopic work-up as outlined above.  I will check his methylmalonic acid and homocystine along with repeat B12 and folate and with his elevated C-reactive protein and sedimentation rate will check a serum immunoelectrophoresis and light chains.  Given his prior drinking history despite the fact that his liver enzymes have been normal I strongly suspect portal hypertension and I suspect the GI bleeding may be related to this but we will get records from .  If we do not get answers from this then we will proceed with bone marrow biopsy.  If there is a monoclonal gammopathy we will also proceed with  bone marrow biopsy.  With his putative history of rheumatoid arthritis he could have splenomegaly with Felty syndrome as well and I will check his ROSARIO and rheumatoid factor.    -2/27/20 data:  Hgb 11.4 o/w normal CBC  Periph. Smear mild hypochromic anemia with mature WBC's    ZACKARY neg  Haptogb 213 high  Retic 2 %    Nl bili direct and indirect  Urine hemosiderin negative  Plasma free hemoglobin normal 7  G6PD 279 normal    ROSARIO neg  RF <10    Epo 22.4    Cr 1.6     nl  B12>2k  Homocysteine 10.5  Folate >20    Liver Spleen US:  Portal vein 1.7cm dilated  Smv Dilated 1.3 cm  But nl spleen size  Hepatomegaly with abnormal liver echogenicity.    Serum K 57.9, lambda 30.7, ratio 1.89.  Serum M spike zero    -5/26/2020 CT chest low-dose without contrast shows chronic granulomatous nodules lung RADS 2 with recommended annual follow-up.  Increased prominence of mucosal thickening distal esophagus compared to 2017 suggestive of esophagitis.    -7/17/2020 gastric angioma ectasias on EGD with Dr. Perez.  No esophageal abnormalities to corroborate with the above CT findings.    -7/27/2020 data:  White count 3600 with hemoglobin 11.6 platelets 137,000.  Creatinine 1.3 with GFR 65  Beta-2 microglobulin 3.2, upper limit 2.2  C-reactive protein 0.49/sedimentation rate 24  Serum immunoelectrophoresis showed no monoclonal spike  Serum free kappa light chains 52.8 with ratio of 2.25  Urine kappa light chains normal 41 with lambda normal 6 and ratio normal at 6  Bone survey essentially negative for myeloma save for a small minor posterior calvarial abnormality most likely venous lake.  Coincidental cardiomegaly with mild pulmonary vascular congestion.      -8/4/2020 data:   Sedimentation rate 37  White count 3100 hemoglobin 11.7 with platelets 115,000  Creatinine 1.27 with GFR greater than 60 and calcium 9.1 normal with normal total protein and albumin    -8/17/2020 hematology follow-up visit: Reviewed above data with  patient.  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  Given persistence of monoclonal light chain with mild renal dysfunction even if the cranial abnormality is just a venous lake, I would still complete work-up with bone marrow biopsy to ensure no plasma cell dyscrasia, myelodysplasia, aplasia.    -8/20/2020 bone marrow biopsy showed mild hypercellularity 33% with out atypia.  No increase in plasma cells.  No T-cell or B-cell clonality on flow.  Normal cytogenetics.  Hemoglobin 11.3 with platelets 123,000.  Hence low likelihood for plasma cell dyscrasia or myelodysplasia.    -9/3/2020 hematology follow-up visit: Reviewed results of bone marrow biopsy.  No hint of myelodysplasia or plasma cell dyscrasia or myelophthisic process.  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    -4/26/2021 [facility] hematology oncology follow-up visit: I reviewed his 3/22/2021 data: White count 2810 with hemoglobin 11 with platelets 134,000 and absolute neutrophil count 1200.  CMP unremarkable with normal liver enzymes and creatinine 1.15 with normal calcium 9.0 and normal total protein and alkaline phosphatase.  No serum M spike and scant elevation of kappa light chain 37.7 with normal lambda all improved over the last year.  Normal quantitative immunoglobulins.  24-hour urine immunoelectrophoresis had no monoclonality.  Sedimentation rate 21 with C-reactive protein 0.91 both minimally elevated.   Ionized calcium just barely above normal 1.37.  Beta-2 microglobulin 3.0 stable since July.  The bulk of his pancytopenia is sequestered if portal hypertension.  No significant kappa light chains in the serum and he has gastric angio ectasias.  We will follow with Dr. Perez and I will repeat his M panel again in 1 year and if that is stable would probably discontinue routine M panel testing as I suspect this is just an inflammatory marker with rheumatoid arthritis.    -5/2/2022 [facility] Hematology clinic follow-up: Unfortunately Mr. Camejo did not get his labs prior to our appointment today.  We will check his M panel again today (serum free light chains, serum immunoelectrophoresis) along with CBC, CMP, sed rate and CRP.  Assuming he still has no abnormal protein and his CBC is stable then we will discontinue routine hematological follow-up as recommended at previous visit with Dr. Herrera on 4/26/2021.  I will call him later this week with his lab results from today.  Addendum: 5/13/2022 phone call: No m spike.  Labs stable fu prn.    - 2/24/2023 [facility] hematology follow-up: 8/3/2022 sedimentation rate 45 C-reactive protein 4.47.  White count 2400 hemoglobin 11.1 platelets 119,000 unremarkable BMP save for creatinine 1.41.  Normal total protein, albumin globulin and ratios.  Normal liver enzymes.  2/10/2023 White count 3500 hemoglobin 11 platelets 113,000 with unremarkable differential sedimentation rate 45.  Uric acid mildly elevated 7.2.  I have put in a referral back to Dr. 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  His blood counts overall have been stable for the better part of the last 9 months and he will see us on an as-needed basis.    Total time of care today 15 minutes  Yuval Herrera MD    02/24/2023

## 2023-03-05 ENCOUNTER — HOSPITAL ENCOUNTER (OUTPATIENT)
Facility: HOSPITAL | Age: 78
LOS: 3 days | Discharge: HOME | End: 2023-03-08
Attending: EMERGENCY MEDICINE | Admitting: HOSPITALIST
Payer: MEDICARE

## 2023-03-05 ENCOUNTER — APPOINTMENT (OUTPATIENT)
Dept: GENERAL RADIOLOGY | Facility: HOSPITAL | Age: 78
End: 2023-03-05
Payer: MEDICARE

## 2023-03-05 ENCOUNTER — APPOINTMENT (OUTPATIENT)
Dept: CT IMAGING | Facility: HOSPITAL | Age: 78
End: 2023-03-05
Payer: MEDICARE

## 2023-03-05 DIAGNOSIS — J96.21 ACUTE ON CHRONIC RESPIRATORY FAILURE WITH HYPOXIA: ICD-10-CM

## 2023-03-05 DIAGNOSIS — I50.9 ACUTE ON CHRONIC CONGESTIVE HEART FAILURE, UNSPECIFIED HEART FAILURE TYPE: Primary | ICD-10-CM

## 2023-03-05 DIAGNOSIS — N28.9 MILD RENAL INSUFFICIENCY: ICD-10-CM

## 2023-03-05 DIAGNOSIS — R60.9 PERIPHERAL EDEMA: ICD-10-CM

## 2023-03-05 PROBLEM — D64.9 ANEMIA: Status: ACTIVE | Noted: 2020-02-26

## 2023-03-05 PROBLEM — I10 ESSENTIAL HYPERTENSION: Status: ACTIVE | Noted: 2023-03-05

## 2023-03-05 PROBLEM — M06.9 RHEUMATOID ARTHRITIS (HCC): Status: ACTIVE | Noted: 2023-03-05

## 2023-03-05 PROBLEM — N18.30 CKD (CHRONIC KIDNEY DISEASE) STAGE 3, GFR 30-59 ML/MIN (HCC): Status: ACTIVE | Noted: 2023-03-05

## 2023-03-05 PROBLEM — R59.0 INGUINAL LYMPHADENOPATHY: Status: ACTIVE | Noted: 2023-03-05

## 2023-03-05 PROBLEM — E11.9 TYPE 2 DIABETES MELLITUS (HCC): Status: ACTIVE | Noted: 2023-03-05

## 2023-03-05 PROBLEM — D69.6 THROMBOCYTOPENIA (HCC): Status: ACTIVE | Noted: 2023-03-05

## 2023-03-05 PROBLEM — J44.9 COPD (CHRONIC OBSTRUCTIVE PULMONARY DISEASE) (HCC): Status: ACTIVE | Noted: 2023-03-05

## 2023-03-05 PROBLEM — G47.33 OSA (OBSTRUCTIVE SLEEP APNEA): Status: ACTIVE | Noted: 2023-03-05

## 2023-03-05 LAB
ALBUMIN SERPL-MCNC: 3.8 G/DL (ref 3.5–5.2)
ALBUMIN/GLOB SERPL: 1.3 G/DL
ALP SERPL-CCNC: 80 U/L (ref 39–117)
ALT SERPL W P-5'-P-CCNC: 15 U/L (ref 1–41)
ANION GAP SERPL CALCULATED.3IONS-SCNC: 7 MMOL/L (ref 5–15)
ARTERIAL PATENCY WRIST A: POSITIVE
AST SERPL-CCNC: 19 U/L (ref 1–40)
ATMOSPHERIC PRESS: ABNORMAL MM[HG]
BASE EXCESS BLDA CALC-SCNC: 0.3 MMOL/L (ref 0–2)
BASOPHILS # BLD AUTO: 0.04 10*3/MM3 (ref 0–0.2)
BASOPHILS NFR BLD AUTO: 1 % (ref 0–1.5)
BDY SITE: ABNORMAL
BILIRUB SERPL-MCNC: 0.5 MG/DL (ref 0–1.2)
BODY TEMPERATURE: 37 C
BUN SERPL-MCNC: 26 MG/DL (ref 8–23)
BUN/CREAT SERPL: 17.4 (ref 7–25)
CALCIUM SPEC-SCNC: 8.4 MG/DL (ref 8.6–10.5)
CHLORIDE SERPL-SCNC: 111 MMOL/L (ref 98–107)
CO2 BLDA-SCNC: 28.7 MMOL/L (ref 22–33)
CO2 SERPL-SCNC: 27 MMOL/L (ref 22–29)
COHGB MFR BLD: 1.8 % (ref 0–2)
CREAT SERPL-MCNC: 1.49 MG/DL (ref 0.76–1.27)
D-LACTATE SERPL-SCNC: 0.9 MMOL/L (ref 0.5–2)
DEPRECATED RDW RBC AUTO: 45.7 FL (ref 37–54)
EGFRCR SERPLBLD CKD-EPI 2021: 47.7 ML/MIN/1.73
EOSINOPHIL # BLD AUTO: 0.14 10*3/MM3 (ref 0–0.4)
EOSINOPHIL NFR BLD AUTO: 3.6 % (ref 0.3–6.2)
EPAP: 0
ERYTHROCYTE [DISTWIDTH] IN BLOOD BY AUTOMATED COUNT: 13.5 % (ref 12.3–15.4)
FLUAV RNA RESP QL NAA+PROBE: NOT DETECTED
FLUBV RNA RESP QL NAA+PROBE: NOT DETECTED
GLOBULIN UR ELPH-MCNC: 3 GM/DL
GLUCOSE SERPL-MCNC: 104 MG/DL (ref 65–99)
HCO3 BLDA-SCNC: 27.1 MMOL/L (ref 20–26)
HCT VFR BLD AUTO: 29.4 % (ref 37.5–51)
HCT VFR BLD CALC: 30.1 % (ref 38–51)
HGB BLD-MCNC: 9.5 G/DL (ref 13–17.7)
HGB BLDA-MCNC: 9.8 G/DL (ref 13.5–17.5)
HOLD SPECIMEN: NORMAL
IMM GRANULOCYTES # BLD AUTO: 0.02 10*3/MM3 (ref 0–0.05)
IMM GRANULOCYTES NFR BLD AUTO: 0.5 % (ref 0–0.5)
INHALED O2 CONCENTRATION: 32 %
IPAP: 0
LYMPHOCYTES # BLD AUTO: 0.89 10*3/MM3 (ref 0.7–3.1)
LYMPHOCYTES NFR BLD AUTO: 23.1 % (ref 19.6–45.3)
MCH RBC QN AUTO: 30.1 PG (ref 26.6–33)
MCHC RBC AUTO-ENTMCNC: 32.3 G/DL (ref 31.5–35.7)
MCV RBC AUTO: 93 FL (ref 79–97)
METHGB BLD QL: 0.2 % (ref 0–1.5)
MODALITY: ABNORMAL
MONOCYTES # BLD AUTO: 0.39 10*3/MM3 (ref 0.1–0.9)
MONOCYTES NFR BLD AUTO: 10.1 % (ref 5–12)
NEUTROPHILS NFR BLD AUTO: 2.37 10*3/MM3 (ref 1.7–7)
NEUTROPHILS NFR BLD AUTO: 61.7 % (ref 42.7–76)
NOTE: ABNORMAL
NRBC BLD AUTO-RTO: 0 /100 WBC (ref 0–0.2)
NT-PROBNP SERPL-MCNC: 2179 PG/ML (ref 0–1800)
OXYHGB MFR BLDV: 93.9 % (ref 94–99)
PAW @ PEAK INSP FLOW SETTING VENT: 0 CMH2O
PCO2 BLDA: 53.2 MM HG (ref 35–45)
PCO2 TEMP ADJ BLD: 53.2 MM HG (ref 35–48)
PH BLDA: 7.32 PH UNITS (ref 7.35–7.45)
PH, TEMP CORRECTED: 7.32 PH UNITS
PLATELET # BLD AUTO: 124 10*3/MM3 (ref 140–450)
PMV BLD AUTO: 9.7 FL (ref 6–12)
PO2 BLDA: 74.8 MM HG (ref 83–108)
PO2 TEMP ADJ BLD: 74.8 MM HG (ref 83–108)
POTASSIUM SERPL-SCNC: 4.6 MMOL/L (ref 3.5–5.2)
PROCALCITONIN SERPL-MCNC: 0.07 NG/ML (ref 0–0.25)
PROT SERPL-MCNC: 6.8 G/DL (ref 6–8.5)
QT INTERVAL: 386 MS
QTC INTERVAL: 445 MS
RBC # BLD AUTO: 3.16 10*6/MM3 (ref 4.14–5.8)
SARS-COV-2 RNA RESP QL NAA+PROBE: NOT DETECTED
SODIUM SERPL-SCNC: 145 MMOL/L (ref 136–145)
TOTAL RATE: 0 BREATHS/MINUTE
TROPONIN T SERPL HS-MCNC: 53 NG/L
WBC NRBC COR # BLD: 3.85 10*3/MM3 (ref 3.4–10.8)
WHOLE BLOOD HOLD COAG: NORMAL
WHOLE BLOOD HOLD SPECIMEN: NORMAL

## 2023-03-05 PROCEDURE — 80053 COMPREHEN METABOLIC PANEL: CPT | Performed by: EMERGENCY MEDICINE

## 2023-03-05 PROCEDURE — 83605 ASSAY OF LACTIC ACID: CPT | Performed by: EMERGENCY MEDICINE

## 2023-03-05 PROCEDURE — 83050 HGB METHEMOGLOBIN QUAN: CPT

## 2023-03-05 PROCEDURE — 94664 DEMO&/EVAL PT USE INHALER: CPT

## 2023-03-05 PROCEDURE — 84484 ASSAY OF TROPONIN QUANT: CPT | Performed by: EMERGENCY MEDICINE

## 2023-03-05 PROCEDURE — 87040 BLOOD CULTURE FOR BACTERIA: CPT | Performed by: EMERGENCY MEDICINE

## 2023-03-05 PROCEDURE — 84145 PROCALCITONIN (PCT): CPT | Performed by: EMERGENCY MEDICINE

## 2023-03-05 PROCEDURE — 83880 ASSAY OF NATRIURETIC PEPTIDE: CPT | Performed by: EMERGENCY MEDICINE

## 2023-03-05 PROCEDURE — 96365 THER/PROPH/DIAG IV INF INIT: CPT

## 2023-03-05 PROCEDURE — 25010000002 METHYLPREDNISOLONE PER 125 MG: Performed by: EMERGENCY MEDICINE

## 2023-03-05 PROCEDURE — 94799 UNLISTED PULMONARY SVC/PX: CPT

## 2023-03-05 PROCEDURE — 25010000002 PIPERACILLIN SOD-TAZOBACTAM PER 1 G: Performed by: EMERGENCY MEDICINE

## 2023-03-05 PROCEDURE — 87636 SARSCOV2 & INF A&B AMP PRB: CPT | Performed by: EMERGENCY MEDICINE

## 2023-03-05 PROCEDURE — 74177 CT ABD & PELVIS W/CONTRAST: CPT

## 2023-03-05 PROCEDURE — 71275 CT ANGIOGRAPHY CHEST: CPT

## 2023-03-05 PROCEDURE — 93005 ELECTROCARDIOGRAM TRACING: CPT | Performed by: EMERGENCY MEDICINE

## 2023-03-05 PROCEDURE — 25010000002 VANCOMYCIN 10 G RECONSTITUTED SOLUTION: Performed by: EMERGENCY MEDICINE

## 2023-03-05 PROCEDURE — 82805 BLOOD GASES W/O2 SATURATION: CPT

## 2023-03-05 PROCEDURE — 36415 COLL VENOUS BLD VENIPUNCTURE: CPT

## 2023-03-05 PROCEDURE — 82375 ASSAY CARBOXYHB QUANT: CPT

## 2023-03-05 PROCEDURE — 25510000001 IOPAMIDOL PER 1 ML: Performed by: EMERGENCY MEDICINE

## 2023-03-05 PROCEDURE — 36600 WITHDRAWAL OF ARTERIAL BLOOD: CPT

## 2023-03-05 PROCEDURE — 99223 1ST HOSP IP/OBS HIGH 75: CPT | Performed by: INTERNAL MEDICINE

## 2023-03-05 PROCEDURE — 85025 COMPLETE CBC W/AUTO DIFF WBC: CPT | Performed by: EMERGENCY MEDICINE

## 2023-03-05 PROCEDURE — 99284 EMERGENCY DEPT VISIT MOD MDM: CPT

## 2023-03-05 PROCEDURE — 94640 AIRWAY INHALATION TREATMENT: CPT

## 2023-03-05 PROCEDURE — C9803 HOPD COVID-19 SPEC COLLECT: HCPCS

## 2023-03-05 PROCEDURE — 96375 TX/PRO/DX INJ NEW DRUG ADDON: CPT

## 2023-03-05 PROCEDURE — 71045 X-RAY EXAM CHEST 1 VIEW: CPT

## 2023-03-05 RX ORDER — BUMETANIDE 0.25 MG/ML
2 INJECTION INTRAMUSCULAR; INTRAVENOUS ONCE
Status: COMPLETED | OUTPATIENT
Start: 2023-03-05 | End: 2023-03-05

## 2023-03-05 RX ORDER — DEXTROSE MONOHYDRATE 25 G/50ML
25 INJECTION, SOLUTION INTRAVENOUS
Status: DISCONTINUED | OUTPATIENT
Start: 2023-03-05 | End: 2023-03-08 | Stop reason: HOSPADM

## 2023-03-05 RX ORDER — NICOTINE POLACRILEX 4 MG
15 LOZENGE BUCCAL
Status: DISCONTINUED | OUTPATIENT
Start: 2023-03-05 | End: 2023-03-08 | Stop reason: HOSPADM

## 2023-03-05 RX ORDER — IBUPROFEN 600 MG/1
1 TABLET ORAL
Status: DISCONTINUED | OUTPATIENT
Start: 2023-03-05 | End: 2023-03-08 | Stop reason: HOSPADM

## 2023-03-05 RX ORDER — METHYLPREDNISOLONE SODIUM SUCCINATE 125 MG/2ML
125 INJECTION, POWDER, LYOPHILIZED, FOR SOLUTION INTRAMUSCULAR; INTRAVENOUS ONCE
Status: COMPLETED | OUTPATIENT
Start: 2023-03-05 | End: 2023-03-05

## 2023-03-05 RX ORDER — IPRATROPIUM BROMIDE AND ALBUTEROL SULFATE 2.5; .5 MG/3ML; MG/3ML
3 SOLUTION RESPIRATORY (INHALATION) ONCE
Status: COMPLETED | OUTPATIENT
Start: 2023-03-05 | End: 2023-03-05

## 2023-03-05 RX ORDER — INSULIN LISPRO 100 [IU]/ML
0-9 INJECTION, SOLUTION INTRAVENOUS; SUBCUTANEOUS
Status: DISCONTINUED | OUTPATIENT
Start: 2023-03-06 | End: 2023-03-08 | Stop reason: HOSPADM

## 2023-03-05 RX ORDER — SODIUM CHLORIDE 0.9 % (FLUSH) 0.9 %
10 SYRINGE (ML) INJECTION AS NEEDED
Status: DISCONTINUED | OUTPATIENT
Start: 2023-03-05 | End: 2023-03-08 | Stop reason: HOSPADM

## 2023-03-05 RX ORDER — IPRATROPIUM BROMIDE AND ALBUTEROL SULFATE 2.5; .5 MG/3ML; MG/3ML
3 SOLUTION RESPIRATORY (INHALATION)
Status: DISCONTINUED | OUTPATIENT
Start: 2023-03-05 | End: 2023-03-06

## 2023-03-05 RX ADMIN — METHYLPREDNISOLONE SODIUM SUCCINATE 125 MG: 125 INJECTION, POWDER, FOR SOLUTION INTRAMUSCULAR; INTRAVENOUS at 19:32

## 2023-03-05 RX ADMIN — VANCOMYCIN HYDROCHLORIDE 2250 MG: 10 INJECTION, POWDER, LYOPHILIZED, FOR SOLUTION INTRAVENOUS at 21:44

## 2023-03-05 RX ADMIN — IPRATROPIUM BROMIDE AND ALBUTEROL SULFATE 3 ML: 2.5; .5 SOLUTION RESPIRATORY (INHALATION) at 21:10

## 2023-03-05 RX ADMIN — IOPAMIDOL 100 ML: 755 INJECTION, SOLUTION INTRAVENOUS at 20:46

## 2023-03-05 RX ADMIN — TAZOBACTAM SODIUM AND PIPERACILLIN SODIUM 3.38 G: 375; 3 INJECTION, SOLUTION INTRAVENOUS at 21:07

## 2023-03-05 RX ADMIN — BUMETANIDE 2 MG: 0.25 INJECTION INTRAMUSCULAR; INTRAVENOUS at 19:32

## 2023-03-06 ENCOUNTER — APPOINTMENT (OUTPATIENT)
Dept: GENERAL RADIOLOGY | Facility: HOSPITAL | Age: 78
End: 2023-03-06
Payer: MEDICARE

## 2023-03-06 ENCOUNTER — APPOINTMENT (OUTPATIENT)
Dept: CARDIOLOGY | Facility: HOSPITAL | Age: 78
End: 2023-03-06
Payer: MEDICARE

## 2023-03-06 ENCOUNTER — APPOINTMENT (OUTPATIENT)
Dept: ULTRASOUND IMAGING | Facility: HOSPITAL | Age: 78
End: 2023-03-06
Payer: MEDICARE

## 2023-03-06 LAB
ANION GAP SERPL CALCULATED.3IONS-SCNC: 11 MMOL/L (ref 5–15)
ASCENDING AORTA: 3.3 CM
BASOPHILS # BLD AUTO: 0.02 10*3/MM3 (ref 0–0.2)
BASOPHILS NFR BLD AUTO: 0.7 % (ref 0–1.5)
BH CV ECHO MEAS - AO MAX PG: 27.7 MMHG
BH CV ECHO MEAS - AO MEAN PG: 14.4 MMHG
BH CV ECHO MEAS - AO ROOT DIAM: 3 CM
BH CV ECHO MEAS - AO V2 MAX: 263 CM/SEC
BH CV ECHO MEAS - AO V2 VTI: 54.8 CM
BH CV ECHO MEAS - AVA(I,D): 2 CM2
BH CV ECHO MEAS - EDV(CUBED): 110.6 ML
BH CV ECHO MEAS - EDV(MOD-SP2): 65 ML
BH CV ECHO MEAS - EDV(MOD-SP4): 130 ML
BH CV ECHO MEAS - EF(MOD-BP): 74 %
BH CV ECHO MEAS - EF(MOD-SP2): 55.8 %
BH CV ECHO MEAS - EF(MOD-SP4): 82.4 %
BH CV ECHO MEAS - ESV(CUBED): 19.7 ML
BH CV ECHO MEAS - ESV(MOD-SP2): 28.7 ML
BH CV ECHO MEAS - ESV(MOD-SP4): 22.9 ML
BH CV ECHO MEAS - FS: 43.8 %
BH CV ECHO MEAS - IVS/LVPW: 0.87 CM
BH CV ECHO MEAS - IVSD: 1.3 CM
BH CV ECHO MEAS - LA DIMENSION: 4.4 CM
BH CV ECHO MEAS - LAT PEAK E' VEL: 10.8 CM/SEC
BH CV ECHO MEAS - LV DIASTOLIC VOL/BSA (35-75): 58.9 CM2
BH CV ECHO MEAS - LV MASS(C)D: 273.8 GRAMS
BH CV ECHO MEAS - LV MAX PG: 8.2 MMHG
BH CV ECHO MEAS - LV MEAN PG: 4 MMHG
BH CV ECHO MEAS - LV SYSTOLIC VOL/BSA (12-30): 10.4 CM2
BH CV ECHO MEAS - LV V1 MAX: 143 CM/SEC
BH CV ECHO MEAS - LV V1 VTI: 31.7 CM
BH CV ECHO MEAS - LVIDD: 4.8 CM
BH CV ECHO MEAS - LVIDS: 2.7 CM
BH CV ECHO MEAS - LVOT AREA: 3.5 CM2
BH CV ECHO MEAS - LVOT DIAM: 2.1 CM
BH CV ECHO MEAS - LVPWD: 1.5 CM
BH CV ECHO MEAS - MED PEAK E' VEL: 9.7 CM/SEC
BH CV ECHO MEAS - MV A MAX VEL: 114 CM/SEC
BH CV ECHO MEAS - MV DEC SLOPE: 523 CM/SEC2
BH CV ECHO MEAS - MV DEC TIME: 0.19 MSEC
BH CV ECHO MEAS - MV E MAX VEL: 98.5 CM/SEC
BH CV ECHO MEAS - MV E/A: 0.86
BH CV ECHO MEAS - MV MAX PG: 6.3 MMHG
BH CV ECHO MEAS - MV MEAN PG: 3 MMHG
BH CV ECHO MEAS - MV V2 VTI: 38.9 CM
BH CV ECHO MEAS - MVA(VTI): 2.8 CM2
BH CV ECHO MEAS - PA ACC TIME: 0.14 SEC
BH CV ECHO MEAS - PA PR(ACCEL): 18 MMHG
BH CV ECHO MEAS - PA V2 MAX: 148.5 CM/SEC
BH CV ECHO MEAS - SI(MOD-SP2): 16.5 ML/M2
BH CV ECHO MEAS - SI(MOD-SP4): 48.6 ML/M2
BH CV ECHO MEAS - SV(LVOT): 109.8 ML
BH CV ECHO MEAS - SV(MOD-SP2): 36.3 ML
BH CV ECHO MEAS - SV(MOD-SP4): 107.1 ML
BH CV ECHO MEAS - TAPSE (>1.6): 2.8 CM
BH CV ECHO MEASUREMENTS AVERAGE E/E' RATIO: 9.61
BH CV VAS BP LEFT ARM: NORMAL MMHG
BH CV XLRA - RV BASE: 3.8 CM
BH CV XLRA - RV LENGTH: 9.1 CM
BH CV XLRA - RV MID: 3.1 CM
BH CV XLRA - TDI S': 19.3 CM/SEC
BUN SERPL-MCNC: 32 MG/DL (ref 8–23)
BUN/CREAT SERPL: 18.9 (ref 7–25)
CALCIUM SPEC-SCNC: 8.9 MG/DL (ref 8.6–10.5)
CHLORIDE SERPL-SCNC: 107 MMOL/L (ref 98–107)
CHOLEST SERPL-MCNC: 130 MG/DL (ref 0–200)
CO2 SERPL-SCNC: 25 MMOL/L (ref 22–29)
CREAT SERPL-MCNC: 1.69 MG/DL (ref 0.76–1.27)
DEPRECATED RDW RBC AUTO: 45.1 FL (ref 37–54)
EGFRCR SERPLBLD CKD-EPI 2021: 41 ML/MIN/1.73
EOSINOPHIL # BLD AUTO: 0 10*3/MM3 (ref 0–0.4)
EOSINOPHIL NFR BLD AUTO: 0 % (ref 0.3–6.2)
ERYTHROCYTE [DISTWIDTH] IN BLOOD BY AUTOMATED COUNT: 13.4 % (ref 12.3–15.4)
GEN 5 2HR TROPONIN T REFLEX: 48 NG/L
GLUCOSE BLDC GLUCOMTR-MCNC: 121 MG/DL (ref 70–130)
GLUCOSE BLDC GLUCOMTR-MCNC: 124 MG/DL (ref 70–130)
GLUCOSE BLDC GLUCOMTR-MCNC: 202 MG/DL (ref 70–130)
GLUCOSE SERPL-MCNC: 121 MG/DL (ref 65–99)
HBA1C MFR BLD: 5.1 % (ref 4.8–5.6)
HCT VFR BLD AUTO: 29.8 % (ref 37.5–51)
HDLC SERPL-MCNC: 37 MG/DL (ref 40–60)
HGB BLD-MCNC: 9.6 G/DL (ref 13–17.7)
IMM GRANULOCYTES # BLD AUTO: 0.01 10*3/MM3 (ref 0–0.05)
IMM GRANULOCYTES NFR BLD AUTO: 0.3 % (ref 0–0.5)
IVRT: 74 MSEC
LDLC SERPL CALC-MCNC: 80 MG/DL (ref 0–100)
LDLC/HDLC SERPL: 2.19 {RATIO}
LEFT ATRIUM VOLUME INDEX: 35 ML/M2
LYMPHOCYTES # BLD AUTO: 0.52 10*3/MM3 (ref 0.7–3.1)
LYMPHOCYTES NFR BLD AUTO: 17.1 % (ref 19.6–45.3)
MAXIMAL PREDICTED HEART RATE: 142 BPM
MCH RBC QN AUTO: 29.7 PG (ref 26.6–33)
MCHC RBC AUTO-ENTMCNC: 32.2 G/DL (ref 31.5–35.7)
MCV RBC AUTO: 92.3 FL (ref 79–97)
MONOCYTES # BLD AUTO: 0.11 10*3/MM3 (ref 0.1–0.9)
MONOCYTES NFR BLD AUTO: 3.6 % (ref 5–12)
NEUTROPHILS NFR BLD AUTO: 2.38 10*3/MM3 (ref 1.7–7)
NEUTROPHILS NFR BLD AUTO: 78.3 % (ref 42.7–76)
NRBC BLD AUTO-RTO: 0 /100 WBC (ref 0–0.2)
PLATELET # BLD AUTO: 146 10*3/MM3 (ref 140–450)
PMV BLD AUTO: 10.8 FL (ref 6–12)
POTASSIUM SERPL-SCNC: 5.1 MMOL/L (ref 3.5–5.2)
RBC # BLD AUTO: 3.23 10*6/MM3 (ref 4.14–5.8)
SODIUM SERPL-SCNC: 143 MMOL/L (ref 136–145)
STRESS TARGET HR: 121 BPM
TRIGL SERPL-MCNC: 59 MG/DL (ref 0–150)
TROPONIN T DELTA: -1 NG/L
TROPONIN T SERPL HS-MCNC: 49 NG/L
VLDLC SERPL-MCNC: 13 MG/DL (ref 5–40)
WBC NRBC COR # BLD: 3.04 10*3/MM3 (ref 3.4–10.8)

## 2023-03-06 PROCEDURE — 94799 UNLISTED PULMONARY SVC/PX: CPT

## 2023-03-06 PROCEDURE — 94660 CPAP INITIATION&MGMT: CPT

## 2023-03-06 PROCEDURE — 74018 RADEX ABDOMEN 1 VIEW: CPT

## 2023-03-06 PROCEDURE — 96376 TX/PRO/DX INJ SAME DRUG ADON: CPT

## 2023-03-06 PROCEDURE — 97161 PT EVAL LOW COMPLEX 20 MIN: CPT

## 2023-03-06 PROCEDURE — 80048 BASIC METABOLIC PNL TOTAL CA: CPT | Performed by: PHYSICIAN ASSISTANT

## 2023-03-06 PROCEDURE — 76705 ECHO EXAM OF ABDOMEN: CPT

## 2023-03-06 PROCEDURE — 80061 LIPID PANEL: CPT | Performed by: PHYSICIAN ASSISTANT

## 2023-03-06 PROCEDURE — 93306 TTE W/DOPPLER COMPLETE: CPT

## 2023-03-06 PROCEDURE — 93306 TTE W/DOPPLER COMPLETE: CPT | Performed by: INTERNAL MEDICINE

## 2023-03-06 PROCEDURE — 84484 ASSAY OF TROPONIN QUANT: CPT | Performed by: PHYSICIAN ASSISTANT

## 2023-03-06 PROCEDURE — 97165 OT EVAL LOW COMPLEX 30 MIN: CPT

## 2023-03-06 PROCEDURE — 99214 OFFICE O/P EST MOD 30 MIN: CPT | Performed by: INTERNAL MEDICINE

## 2023-03-06 PROCEDURE — 99232 SBSQ HOSP IP/OBS MODERATE 35: CPT | Performed by: HOSPITALIST

## 2023-03-06 PROCEDURE — 63710000001 INSULIN LISPRO (HUMAN) PER 5 UNITS: Performed by: PHYSICIAN ASSISTANT

## 2023-03-06 PROCEDURE — 99222 1ST HOSP IP/OBS MODERATE 55: CPT | Performed by: INTERNAL MEDICINE

## 2023-03-06 PROCEDURE — 85025 COMPLETE CBC W/AUTO DIFF WBC: CPT | Performed by: PHYSICIAN ASSISTANT

## 2023-03-06 PROCEDURE — 94761 N-INVAS EAR/PLS OXIMETRY MLT: CPT

## 2023-03-06 PROCEDURE — 83036 HEMOGLOBIN GLYCOSYLATED A1C: CPT | Performed by: PHYSICIAN ASSISTANT

## 2023-03-06 PROCEDURE — 82962 GLUCOSE BLOOD TEST: CPT

## 2023-03-06 RX ORDER — SODIUM CHLORIDE 0.9 % (FLUSH) 0.9 %
10 SYRINGE (ML) INJECTION EVERY 12 HOURS SCHEDULED
Status: DISCONTINUED | OUTPATIENT
Start: 2023-03-06 | End: 2023-03-08 | Stop reason: HOSPADM

## 2023-03-06 RX ORDER — ONDANSETRON 2 MG/ML
4 INJECTION INTRAMUSCULAR; INTRAVENOUS EVERY 6 HOURS PRN
Status: DISCONTINUED | OUTPATIENT
Start: 2023-03-06 | End: 2023-03-08 | Stop reason: HOSPADM

## 2023-03-06 RX ORDER — ACETAMINOPHEN 325 MG/1
650 TABLET ORAL EVERY 4 HOURS PRN
Status: DISCONTINUED | OUTPATIENT
Start: 2023-03-06 | End: 2023-03-08 | Stop reason: HOSPADM

## 2023-03-06 RX ORDER — HYDRALAZINE HYDROCHLORIDE 50 MG/1
50 TABLET, FILM COATED ORAL 2 TIMES DAILY
Status: DISCONTINUED | OUTPATIENT
Start: 2023-03-07 | End: 2023-03-08 | Stop reason: HOSPADM

## 2023-03-06 RX ORDER — TERAZOSIN 5 MG/1
5 CAPSULE ORAL EVERY 12 HOURS SCHEDULED
Status: DISCONTINUED | OUTPATIENT
Start: 2023-03-06 | End: 2023-03-08 | Stop reason: HOSPADM

## 2023-03-06 RX ORDER — CETIRIZINE HYDROCHLORIDE 10 MG/1
10 TABLET ORAL DAILY
COMMUNITY
End: 2023-03-08 | Stop reason: HOSPADM

## 2023-03-06 RX ORDER — BUMETANIDE 0.25 MG/ML
1 INJECTION INTRAMUSCULAR; INTRAVENOUS ONCE
Status: COMPLETED | OUTPATIENT
Start: 2023-03-06 | End: 2023-03-06

## 2023-03-06 RX ORDER — CHOLECALCIFEROL (VITAMIN D3) 125 MCG
5 CAPSULE ORAL NIGHTLY PRN
Status: DISCONTINUED | OUTPATIENT
Start: 2023-03-06 | End: 2023-03-08 | Stop reason: HOSPADM

## 2023-03-06 RX ORDER — ONDANSETRON 4 MG/1
4 TABLET, FILM COATED ORAL EVERY 6 HOURS PRN
Status: DISCONTINUED | OUTPATIENT
Start: 2023-03-06 | End: 2023-03-08 | Stop reason: HOSPADM

## 2023-03-06 RX ORDER — SODIUM CHLORIDE 9 MG/ML
40 INJECTION, SOLUTION INTRAVENOUS AS NEEDED
Status: DISCONTINUED | OUTPATIENT
Start: 2023-03-06 | End: 2023-03-08 | Stop reason: HOSPADM

## 2023-03-06 RX ORDER — SODIUM CHLORIDE 0.9 % (FLUSH) 0.9 %
10 SYRINGE (ML) INJECTION AS NEEDED
Status: DISCONTINUED | OUTPATIENT
Start: 2023-03-06 | End: 2023-03-08 | Stop reason: HOSPADM

## 2023-03-06 RX ORDER — AMLODIPINE BESYLATE 10 MG/1
10 TABLET ORAL
Status: DISCONTINUED | OUTPATIENT
Start: 2023-03-06 | End: 2023-03-08 | Stop reason: HOSPADM

## 2023-03-06 RX ORDER — ASPIRIN 81 MG/1
81 TABLET ORAL DAILY
Status: DISCONTINUED | OUTPATIENT
Start: 2023-03-06 | End: 2023-03-08 | Stop reason: HOSPADM

## 2023-03-06 RX ORDER — METOPROLOL SUCCINATE 50 MG/1
100 TABLET, EXTENDED RELEASE ORAL 2 TIMES DAILY
Status: DISCONTINUED | OUTPATIENT
Start: 2023-03-06 | End: 2023-03-08 | Stop reason: HOSPADM

## 2023-03-06 RX ORDER — ATORVASTATIN CALCIUM 40 MG/1
80 TABLET, FILM COATED ORAL NIGHTLY
Status: DISCONTINUED | OUTPATIENT
Start: 2023-03-06 | End: 2023-03-08 | Stop reason: HOSPADM

## 2023-03-06 RX ORDER — IPRATROPIUM BROMIDE AND ALBUTEROL SULFATE 2.5; .5 MG/3ML; MG/3ML
3 SOLUTION RESPIRATORY (INHALATION)
Status: DISCONTINUED | OUTPATIENT
Start: 2023-03-07 | End: 2023-03-08 | Stop reason: HOSPADM

## 2023-03-06 RX ORDER — MULTIVIT WITH MINERALS/LUTEIN
250 TABLET ORAL 2 TIMES DAILY
COMMUNITY

## 2023-03-06 RX ORDER — PNV NO.95/FERROUS FUM/FOLIC AC 28MG-0.8MG
325 TABLET ORAL
COMMUNITY

## 2023-03-06 RX ORDER — METOCLOPRAMIDE 10 MG/1
5 TABLET ORAL
Status: DISCONTINUED | OUTPATIENT
Start: 2023-03-07 | End: 2023-03-08

## 2023-03-06 RX ADMIN — Medication 10 ML: at 08:13

## 2023-03-06 RX ADMIN — IPRATROPIUM BROMIDE AND ALBUTEROL SULFATE 3 ML: 2.5; .5 SOLUTION RESPIRATORY (INHALATION) at 15:31

## 2023-03-06 RX ADMIN — METOPROLOL SUCCINATE 100 MG: 50 TABLET, EXTENDED RELEASE ORAL at 21:04

## 2023-03-06 RX ADMIN — ATORVASTATIN CALCIUM 80 MG: 40 TABLET, FILM COATED ORAL at 21:04

## 2023-03-06 RX ADMIN — Medication 10 ML: at 03:11

## 2023-03-06 RX ADMIN — BUMETANIDE 1 MG: 0.25 INJECTION INTRAMUSCULAR; INTRAVENOUS at 17:53

## 2023-03-06 RX ADMIN — ASPIRIN 81 MG: 81 TABLET, COATED ORAL at 16:56

## 2023-03-06 RX ADMIN — Medication 10 ML: at 21:05

## 2023-03-06 RX ADMIN — AMLODIPINE BESYLATE 10 MG: 10 TABLET ORAL at 16:56

## 2023-03-06 RX ADMIN — Medication 5 MG: at 21:04

## 2023-03-06 RX ADMIN — IPRATROPIUM BROMIDE AND ALBUTEROL SULFATE 3 ML: 2.5; .5 SOLUTION RESPIRATORY (INHALATION) at 20:28

## 2023-03-06 RX ADMIN — TERAZOSIN HYDROCHLORIDE 5 MG: 5 CAPSULE ORAL at 21:05

## 2023-03-06 RX ADMIN — INSULIN LISPRO 4 UNITS: 100 INJECTION, SOLUTION INTRAVENOUS; SUBCUTANEOUS at 16:56

## 2023-03-06 RX ADMIN — Medication 5 MG: at 01:08

## 2023-03-06 NOTE — CONSULTS
HEMATOLOGY/ONCOLOGY INPATIENT CONSULT    REASON FOR CONSULT: Lymphadenopathy in the inguinal area    Subjective   HISTORY OF PRESENT ILLNESS; I am asked to see this 78 y.o.  male, referred for patient with heart failure and portal hypertension with effusions and ascites now with CT showing inguinal nodes.      Past Medical History:   Diagnosis Date   • Arthritis    • Asthma    • Bowel trouble    • CHF (congestive heart failure) ([facility])    • Chondrocalcinosis of right knee    • Colitis    • COPD (chronic obstructive pulmonary disease) (HCC)    • Diabetes mellitus (HCC)    • Esophagitis    • Gout    • H/O bladder problems    • Heart murmur    • Hypertension    • IBS (irritable bowel syndrome)    • JONAH on CPAP    • Primary osteoarthritis of both knees    • Rheumatoid arthritis (HCC)    • Skin problem    • SOB (shortness of breath)      Past Surgical History:   Procedure Laterality Date   • COLONOSCOPY     • KNEE ARTHROSCOPY Left    • PARTIAL KNEE ARTHROPLASTY Left    • SKIN BIOPSY     • STOMACH SURGERY     • UPPER GASTROINTESTINAL ENDOSCOPY     • VASECTOMY         No current facility-administered medications on file prior to encounter.     Current Outpatient Medications on File Prior to Encounter   Medication Sig Dispense Refill   • allopurinol (ZYLOPRIM) 300 MG tablet Take  by mouth Take As Directed.     • amLODIPine (NORVASC) 10 MG tablet      • aspirin 81 MG EC tablet Take 1 tablet by mouth Daily.     • atorvastatin (LIPITOR) 40 MG tablet Take  by mouth Take As Directed.     • Baclofen 5 MG tablet Take 5 mg by mouth 3 (Three) Times a Day.     • Breo Ellipta 100-25 MCG/INH inhaler      • cetirizine (zyrTEC) 10 MG tablet Take 1 tablet by mouth Daily.     • doxazosin (CARDURA) 4 MG tablet Take 2 tablets by mouth Every Night.     • famotidine (PEPCID) 20 MG tablet Take 1 tablet by mouth 2 (Two) Times a Day.     • ferrous sulfate 325 (65 Fe) MG tablet Take 1 tablet by mouth Daily With Breakfast. OTC     • finasteride  (PROSCAR) 5 MG tablet Take 1 tablet by mouth Daily.     • furosemide (LASIX) 40 MG tablet Take 1 tablet by mouth Take As Directed. 1 - 2 tablets a week     • hydrALAZINE (APRESOLINE) 25 MG tablet Take 2 tablets by mouth 2 (Two) Times a Day.     • Jardiance 10 MG tablet tablet Take 1 tablet by mouth Daily.     • losartan (COZAAR) 100 MG tablet Take 1 tablet by mouth Daily.     • metoprolol succinate XL (TOPROL-XL) 100 MG 24 hr tablet Take 1 tablet by mouth 2 (Two) Times a Day.     • minoxidil (LONITEN) 10 MG tablet Take 1 tablet by mouth 4 (Four) Times a Day.     • omeprazole (priLOSEC) 40 MG capsule Take 1 capsule by mouth Daily With Breakfast, Lunch & Dinner.     • oxybutynin (DITROPAN) 5 MG tablet Take 1 tablet by mouth 2 (Two) Times a Day.     • potassium chloride (K-DUR,KLOR-CON) 20 MEQ CR tablet Take 1 tablet by mouth Daily.     • tiotropium (SPIRIVA) 18 MCG per inhalation capsule Place 1 capsule into inhaler and inhale Every Morning.     • vitamin C (ASCORBIC ACID) 250 MG tablet Take 1 tablet by mouth 2 (Two) Times a Day. OTC     • [DISCONTINUED] ACCU-CHEK SARAHI PLUS test strip      • [DISCONTINUED] ACCU-CHEK SOFTCLIX LANCETS lancets      • [DISCONTINUED] baclofen (LIORESAL) 20 MG tablet Take 5 mg by mouth 3 (Three) Times a Day.     • [DISCONTINUED] ipratropium (ATROVENT) 0.03 % nasal spray      • albuterol sulfate  (90 Base) MCG/ACT inhaler Inhale 2 puffs Every 6 (Six) Hours As Needed.     • COLCRYS 0.6 MG tablet Take 1 tablet by mouth As Needed.     • folic acid (FOLVITE) 1 MG tablet Take 400 tablets by mouth Daily.     • methylcellulose, Laxative, (CITRUCEL) 500 MG tablet tablet Take 1 tablet by mouth 2 (Two) Times a Day.     • Probiotic Product (VISBIOME HIGH POTENCY PO) Take 112.5 mg by mouth 2 (Two) Times a Day. OTC     • [DISCONTINUED] Metoprolol Tartrate (LOPRESSOR PO) metoprolol tartrate     • [DISCONTINUED] Mirabegron ER (MYRBETRIQ) 25 MG tablet sustained-release 24 hour 24 hr tablet Myrbetriq  "25 mg tablet,extended release   Take 1 tablet every day by oral route.     • [DISCONTINUED] oxybutynin XL (DITROPAN-XL) 5 MG 24 hr tablet      • [DISCONTINUED] vitamin C (ASCORBIC ACID) 250 MG tablet          Allergies   Allergen Reactions   • Lisinopril Swelling   • Benazepril Swelling       Social History     Socioeconomic History   • Marital status:    Tobacco Use   • Smoking status: Former     Packs/day: 2.00     Years: 36.00     Pack years: 72.00     Types: Cigarettes   • Smokeless tobacco: Never   Vaping Use   • Vaping Use: Never used   Substance and Sexual Activity   • Alcohol use: Yes     Comment: occasional   • Drug use: No   • Sexual activity: Defer       Family History   Problem Relation Age of Onset   • Hypertension Mother    • Cancer Mother    • Rheum arthritis Mother    • Osteoarthritis Mother    • Hypertension Father    • Heart attack Father    • Heart disease Father    • Hypertension Other    • Heart attack Other    • Heart disease Other    • Cancer Other    • Stroke Other    • BRCA 1/2 Neg Hx    • Breast cancer Neg Hx    • Ovarian cancer Neg Hx          REVIEW OF SYSTEMS:  A 14 point review of systems was performed and is negative except as noted above.    Objective   PHYSICAL EXAM:    /62 (BP Location: Left arm, Patient Position: Sitting)   Pulse 75   Temp 98 °F (36.7 °C) (Oral)   Resp 20   Ht 172.7 cm (67.99\")   Wt 109 kg (241 lb)   SpO2 98%   BMI 36.65 kg/m²               ECOG: (2) Ambulatory & Capable of Self Care, Unable to Carry Out Work Activity, Up & About Greater Than 50% of Waking Hours  General: well appearing male in no acute distress  HEENT: sclerae anicteric, oropharynx clear  Lymphatics: no cervical, supraclavicular, inguinal, or axillary adenopathy  Neck: Supple. No thyromegaly.  Cardiovascular: regular rate and rhythm, no murmurs  Lungs: clear to auscultation bilaterally. No respiratory distress  Abdomen: soft, nontender, nondistended.  No palpable " organomegaly  Extremities: no lower extremity edema, cyanosis, or clubbing  Skin: no rashes, lesions, bruising, or petechiae  Neuro: Alert and oriented x3. Moves all extremities.    Results:    Results from last 7 days   Lab Units 03/06/23 0727 03/05/23 1917   WBC 10*3/mm3 3.04* 3.85   HEMOGLOBIN g/dL 9.6* 9.5*   PLATELETS 10*3/mm3 146 124*     Results from last 7 days   Lab Units 03/06/23 0727 03/05/23 1917   SODIUM mmol/L 143 145   POTASSIUM mmol/L 5.1 4.6   CO2 mmol/L 25.0 27.0   BUN mg/dL 32* 26*   CREATININE mg/dL 1.69* 1.49*   GLUCOSE mg/dL 121* 104*     Results from last 7 days   Lab Units 03/05/23 1917   AST (SGOT) U/L 19   ALT (SGPT) U/L 15   BILIRUBIN mg/dL 0.5   ALK PHOS U/L 80     Results from last 7 days   Lab Units 03/05/23 2039   PH, ARTERIAL pH units 7.315*   PCO2, ARTERIAL mm Hg 53.2*   PO2 ART mm Hg 74.8*     CT Abdomen Pelvis With Contrast    Result Date: 3/5/2023  1.  Moderate CHF/volume overload including small bilateral pleural effusions, small pericardial effusion, and pulmonary interstitial edema. In this setting the minor patchy groundglass pulmonary opacities likely represent alveolar edema. Recommend follow-up chest CT in six months to ensure resolution. 2.  Gallbladder luminal distention and suspected focal wall thickening. Recommend further evaluation with ultrasound or HIDA for acute cholecystitis. 3.  Enlarged bilateral inguinal lymph nodes, favored to be reactive given morphology. Correlate for lower extremity infection. 4.  No evidence of pulmonary embolus. 5.  Chronic findings as above. Electronically signed by:  Dario Voss M.D.  3/5/2023 7:36 PM Mountain Time    US Gallbladder    Result Date: 3/6/2023  Impression: 1. Gallbladder appears within normal limits. No gall but are well thickening or pericholecystic fluid. No gallstones identified. No biliary tract obstruction. 2. Small right pleural effusion 3. Multiple hepatic cyst Electronically Signed: Sunil Block   3/6/2023 9:28 AM EST  Workstation ID: [id number]    XR Chest 1 View    Result Date: 3/5/2023  Impression: Multifocal airspace opacity right lung worse than left with cardiomegaly. Multifocal pneumonia is favored over pulmonary edema. Electronically Signed: Francoise Hightower  3/5/2023 7:47 PM EST  Workstation ID: [id number]    CT Angiogram Chest Pulmonary Embolism    Result Date: 3/5/2023  1.  Moderate CHF/volume overload including small bilateral pleural effusions, small pericardial effusion, and pulmonary interstitial edema. In this setting the minor patchy groundglass pulmonary opacities likely represent alveolar edema. Recommend follow-up chest CT in six months to ensure resolution. 2.  Gallbladder luminal distention and suspected focal wall thickening. Recommend further evaluation with ultrasound or HIDA for acute cholecystitis. 3.  Enlarged bilateral inguinal lymph nodes, favored to be reactive given morphology. Correlate for lower extremity infection. 4.  No evidence of pulmonary embolus. 5.  Chronic findings as above. Electronically signed by:  Dario Voss M.D.  3/5/2023 7:36 PM Mountain Time    XR Abdomen KUB    Result Date: 3/6/2023  Impression: Mild gaseous distention of small bowel and colon in a pattern suggestive of ileus. Electronically Signed: Dee Louis  3/6/2023 4:51 PM EST  Workstation ID: [id number]      Assessment    ASSESSMENT & PLAN:  1.  Pancytopenia with combination of anemia renal disease, possible sequestration with the hepatomegaly/portal vein dilation/SMV dilation on February 2020 liver spleen ultrasound suggesting portal hypertension and possible contribution from the gastric vascular ectasias on EGD  elevation of serum kappa and lambda light chains and gastric angioectasias on 7/17/2020 EGD  2.  Chronic low back pain   3.  Chronic kidney disease  4.  Type 2 diabetes  5.  Hyperlipidemia  6.  Hypertension  7.  Hypogonadism  8.  Depression  9. COPD  10.  Obstructive sleep apnea  11.  Reflux    12.  Gout  13 osteoarthritis  14.  BPH with ED  15.  BMI 36  16.  Putative history of rheumatoid arthritis  17.  Chronic granulomatous lung nodules on CT chest screening with routine follow-up recommended  18.  Scant elevation of serum light chains without intact monoclonal gammopathy and no plasma cell dyscrasia on bone marrow biopsy August 2020     Hematology history:  -Longstanding history of heme positive stools dating back to the late 80s with at least 5 colonoscopies by Dr. Perez including the last 1/2018 as well as EGDs and a capsule endoscopy that apparently showed scattered blood that they could not do much about.In the [facility] system we have hemoglobins of 9.8 and platelet of 121,000 with white count 4880 as of June 2016 and December 2015 white count 3020 with platelets 126,000 hemoglobin 11.4.  Does have a history of heavy alcohol use but none in the past several years.  -8/7/2019 Hemoccult negative  -1/30/2020 reticulocyte count 1.4%.  B12 618.  Iron slightly low 48.  White count 2700 with hemoglobin 11.6 MCV 88 and normal red cell distribution width with platelets 124,000.  Normal thyroid functions.  -2/4/2020 white count 3100 hemoglobin 11.7 with normal MCV and red cell distribution with platelets 126,000 normal liver enzymes and bilirubin including fractionation.  Creatinine 1.36 GFR greater than 60 with normal calcium 9.1.  .  C-reactive protein 9.67 with sedimentation rate elevated at 40 as well.  Was started on iron with vitamin C.     -2/27/2020 initial [facility] hematology consultation: He had dark tarry stools predating the institution of his current iron and extensive prior endoscopic work-up as outlined above.  I will check his methylmalonic acid and homocystine along with repeat B12 and folate and with his elevated C-reactive protein and sedimentation rate will check a serum immunoelectrophoresis and light chains.  Given his prior drinking history despite the fact that his liver enzymes have  been normal I strongly suspect portal hypertension and I suspect the GI bleeding may be related to this but we will get records from .  If we do not get answers from this then we will proceed with bone marrow biopsy.  If there is a monoclonal gammopathy we will also proceed with bone marrow biopsy.  With his putative history of rheumatoid arthritis he could have splenomegaly with Felty syndrome as well and I will check his ROSARIO and rheumatoid factor.     -2/27/20 data:  Hgb 11.4 o/w normal CBC  Periph. Smear mild hypochromic anemia with mature WBC's     ZACKARY neg  Haptogb 213 high  Retic 2 %    Nl bili direct and indirect  Urine hemosiderin negative  Plasma free hemoglobin normal 7  G6PD 279 normal     RSOARIO neg  RF <10     Epo 22.4     Cr 1.6      nl  B12>2k  Homocysteine 10.5  Folate >20     Liver Spleen US:  Portal vein 1.7cm dilated  Smv Dilated 1.3 cm  But nl spleen size  Hepatomegaly with abnormal liver echogenicity.     Serum K 57.9, lambda 30.7, ratio 1.89.  Serum M spike zero     -5/26/2020 CT chest low-dose without contrast shows chronic granulomatous nodules lung RADS 2 with recommended annual follow-up.  Increased prominence of mucosal thickening distal esophagus compared to 2017 suggestive of esophagitis.     -7/17/2020 gastric angioma ectasias on EGD with Dr. Perez.  No esophageal abnormalities to corroborate with the above CT findings.     -7/27/2020 data:  White count 3600 with hemoglobin 11.6 platelets 137,000.  Creatinine 1.3 with GFR 65  Beta-2 microglobulin 3.2, upper limit 2.2  C-reactive protein 0.49/sedimentation rate 24  Serum immunoelectrophoresis showed no monoclonal spike  Serum free kappa light chains 52.8 with ratio of 2.25  Urine kappa light chains normal 41 with lambda normal 6 and ratio normal at 6  Bone survey essentially negative for myeloma save for a small minor posterior calvarial abnormality most likely venous lake.  Coincidental cardiomegaly with mild pulmonary  vascular congestion.        -8/4/2020 data:   Sedimentation rate 37  White count 3100 hemoglobin 11.7 with platelets 115,000  Creatinine 1.27 with GFR greater than 60 and calcium 9.1 normal with normal total protein and albumin     -8/17/2020 hematology follow-up visit: Reviewed above data with patient.  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  Given persistence of monoclonal light chain with mild renal dysfunction even if the cranial abnormality is just a venous lake, I would still complete work-up with bone marrow biopsy to ensure no plasma cell dyscrasia, myelodysplasia, aplasia.     -8/20/2020 bone marrow biopsy showed mild hypercellularity 33% with out atypia.  No increase in plasma cells.  No T-cell or B-cell clonality on flow.  Normal cytogenetics.  Hemoglobin 11.3 with platelets 123,000.  Hence low likelihood for plasma cell dyscrasia or myelodysplasia.     -9/3/2020 hematology follow-up visit: Reviewed results of bone marrow biopsy.  No hint of myelodysplasia or plasma cell dyscrasia or myelophthisic process.  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     -4/26/2021 [facility] hematology oncology follow-up visit: I reviewed his 3/22/2021 data: White count 2810 with hemoglobin 11 with platelets 134,000 and absolute neutrophil count 1200.  CMP unremarkable with normal liver enzymes and creatinine 1.15 with normal calcium 9.0 and normal total protein and alkaline  phosphatase.  No serum M spike and scant elevation of kappa light chain 37.7 with normal lambda all improved over the last year.  Normal quantitative immunoglobulins.  24-hour urine immunoelectrophoresis had no monoclonality.  Sedimentation rate 21 with C-reactive protein 0.91 both minimally elevated.  Ionized calcium just barely above normal 1.37.  Beta-2 microglobulin 3.0 stable since July.  The bulk of his pancytopenia is sequestered if portal hypertension.  No significant kappa light chains in the serum and he has gastric angio ectasias.  We will follow with Dr. Perez and I will repeat his M panel again in 1 year and if that is stable would probably discontinue routine M panel testing as I suspect this is just an inflammatory marker with rheumatoid arthritis.     -5/2/2022 [facility] clinic follow-up: Unfortunately Mr. Camejo did not get his labs prior to our appointment today.  We will check his M panel again today (serum free light chains, serum immunoelectrophoresis) along with CBC, CMP, sed rate and CRP.  Assuming he still has no abnormal protein and his CBC is stable then we will discontinue routine hematological follow-up as recommended at previous visit with Dr. Herrera on 4/26/2021.  I will call him later this week with his lab results from today.  Addendum: 5/13/2022 phone call: No m spike.  Labs stable fu prn.     - 2/24/2023 [facility] hematology follow-up: 8/3/2022 sedimentation rate 45 C-reactive protein 4.47.  White count 2400 hemoglobin 11.1 platelets 119,000 unremarkable BMP save for creatinine 1.41.  Normal total protein, albumin globulin and ratios.  Normal liver enzymes.  2/10/2023 White count 3500 hemoglobin 11 platelets 113,000 with unremarkable differential sedimentation rate 45.  Uric acid mildly elevated 7.2.  I have put in a referral back to Dr. 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  His  blood counts overall have been stable for the better part of the last 9 months and he will see us on an as-needed basis.    -3/6/2023 [facility] inpatient hematology oncology consult: Since last I saw the patient and before he could get back to Dr. Perez for further management of his pancytopenia and gastric antral vascular ectasia, he was admitted with increasing shortness of breath and cough with increasing abdominal girth and pulmonary edema.  On evaluation of this inguinal nodes were found.  I was asked to consult regarding this.  Ultimately need to know what these nodes are and whether the balanced elevation of his light chains could be related to underlying lymphoma which is a possibility albeit unlikely, ultimately we need tissue.  I have spoken with Dr. Coleman who understandably in the midst of his cardiac issues is hesitant to try to excavate an inguinal node surgically.  We will first try ultrasound-guided lymph node biopsy and see if that gives us any definitive answers.  My nurse practitioner will be covering for the next couple of days and I will check back Thursday.    Total time of care today inclusive of time spent at bedside discussing with patient this complex decision tree was 1 hour patient care time 1 hour of which was spent face-to-face discussing with complex tree.        Yuval Herrera MD    3/6/2023

## 2023-03-06 NOTE — THERAPY DISCHARGE NOTE
Patient Name: Miguel Camejo  : 1945    MRN: [id number]                              Today's Date: 3/6/2023       Admit Date: 3/5/2023    Visit Dx:     ICD-10-CM ICD-9-CM   1. Acute on chronic congestive heart failure, unspecified heart failure type ([facility])  I50.9 428.0   2. Acute on chronic respiratory failure with hypoxia ([facility])  J96.21 518.84     799.02   3. Peripheral edema  R60.9 782.3   4. Mild renal insufficiency  N28.9 593.9     Patient Active Problem List   Diagnosis   • Anemia   • Acute on chronic congestive heart failure, unspecified heart failure type ([facility])   • Thrombocytopenia ([facility])   • CKD (chronic kidney disease) stage 3, GFR 30-59 ml/min ([facility])   • Essential hypertension   • JONAH (obstructive sleep apnea)   • Rheumatoid arthritis ([facility])   • COPD (chronic obstructive pulmonary disease) ([facility])   • Type 2 diabetes mellitus ([facility])   • Inguinal lymphadenopathy     Past Medical History:   Diagnosis Date   • Arthritis    • Asthma    • Bowel trouble    • CHF (congestive heart failure) ([facility])    • Chondrocalcinosis of right knee    • Colitis    • COPD (chronic obstructive pulmonary disease) ([facility])    • Diabetes mellitus ([facility])    • Esophagitis    • Gout    • H/O bladder problems    • Heart murmur    • Hypertension    • IBS (irritable bowel syndrome)    • JONAH on CPAP    • Primary osteoarthritis of both knees    • Rheumatoid arthritis ([facility])    • Skin problem    • SOB (shortness of breath)      Past Surgical History:   Procedure Laterality Date   • COLONOSCOPY     • KNEE ARTHROSCOPY Left    • PARTIAL KNEE ARTHROPLASTY Left    • SKIN BIOPSY     • STOMACH SURGERY     • UPPER GASTROINTESTINAL ENDOSCOPY     • VASECTOMY        General Information     Row Name 23 1349          Physical Therapy Time and Intention    Document Type discharge evaluation/summary  -ES     Mode of Treatment physical therapy  -ES     Row Name 23 1349          General Information    Patient Profile Reviewed yes  -ES     Prior  Level of Function independent:;all household mobility;transfer;bed mobility;ADL's;using stairs;min assist:;community mobility  Uses SPC at baseline for community ambulation  -ES     Existing Precautions/Restrictions fall;oxygen therapy device and L/min  -ES     Barriers to Rehab medically complex  -ES     Row Name 03/06/23 1349          Living Environment    People in Home spouse  -ES     Row Name 03/06/23 1349          Home Main Entrance    Number of Stairs, Main Entrance three  -ES     Stair Railings, Main Entrance railings on both sides of stairs  -ES     Row Name 03/06/23 1349          Stairs Within Home, Primary    Stairs, Within Home, Primary all needs met primary level  -ES     Row Name 03/06/23 1349          Cognition    Orientation Status (Cognition) oriented x 4  -ES     Row Name 03/06/23 1349          Safety Issues, Functional Mobility    Safety Issues Affecting Function (Mobility) insight into deficits/self-awareness  -ES     Impairments Affecting Function (Mobility) endurance/activity tolerance;sensation/sensory awareness  -ES     Comment, Safety Issues/Impairments (Mobility) decreased sensation B feet due to swelling.  -ES           User Key  (r) = Recorded By, (t) = Taken By, (c) = Cosigned By    Initials Name Provider Type    ES Cinda Ruano PT Physical Therapist               Mobility     Row Name 03/06/23 1350          Bed Mobility    Comment, (Bed Mobility) Pt received EOB  -ES     Row Name 03/06/23 1350          Sit-Stand Transfer    Sit-Stand [place] (Transfers) standby assist  -ES     Assistive Device (Sit-Stand Transfers) other (see comments)  none  -ES     Row Name 03/06/23 1350          Gait/Stairs (Locomotion)    [place] Level (Gait) standby assist  -ES     Assistive Device (Gait) other (see comments)  none  -ES     Distance in Feet (Gait) 300  -ES     Deviations/Abnormal Patterns (Gait) bilateral deviations;gait speed decreased;stride length decreased  -ES     Bilateral Gait  Deviations forward flexed posture  -ES     [place] Level (Stairs) stand by assist  -ES     Handrail Location (Stairs) both sides  -ES     Number of Steps (Stairs) 3  -ES     Ascending Technique (Stairs) step-over-step  -ES     Descending Technique (Stairs) step-over-step  -ES     Stairs, Impairments sensory feedback impaired  -ES     Comment, (Gait/Stairs) Pt amb 300' with SBA and no AD. Demo'd forward flexed posture with decreased stride length and step through gait pattern. Pt also navigated up/down 3 stairs safely with R hand rail and SBA. Demo'd adequate sequencing and safety awareness. No LOB.  -ES           User Key  (r) = Recorded By, (t) = Taken By, (c) = Cosigned By    Initials Name Provider Type    ES Cinda Ruano, PT Physical Therapist               Obj/Interventions     Row Name 03/06/23 1354          Range of Motion Comprehensive    General Range of Motion bilateral lower extremity ROM WFL  -ES     Row Name 03/06/23 1352          Strength Comprehensive (MMT)    General Manual Muscle Testing (MMT) Assessment no strength deficits identified  -ES     Row Name 03/06/23 1352          Balance    Balance Assessment sitting static balance;sitting dynamic balance;sit to stand dynamic balance;standing static balance;standing dynamic balance  -ES     Static Sitting Balance independent  -ES     Dynamic Sitting Balance independent  -ES     Position, Sitting Balance unsupported;sitting edge of bed;sitting in chair  -ES     Sit to Stand Dynamic Balance standby assist  -ES     Static Standing Balance standby assist  -ES     Dynamic Standing Balance standby assist  -ES     Position/Device Used, Standing Balance unsupported  -ES     Balance Interventions sitting;standing;sit to stand;static;dynamic;occupation based/functional task;step overs;minimal challenge  -ES     Comment, Balance No LOB. Pt demonstrated good safety awareness and sequencing throughout mobility.  -ES     Row Name 03/06/23 0010           Sensory Assessment (Somatosensory)    Sensory Assessment (Somatosensory) bilateral LE  -ES     Bilateral LE Sensory Assessment impaired  Pt able to localize sensation but reports it feels weak due to swelling  -ES           User Key  (r) = Recorded By, (t) = Taken By, (c) = Cosigned By    Initials Name Provider Type    Cinda Barnes, PT Physical Therapist               Goals/Plan    No documentation.                Clinical Impression     Row Name 03/06/23 2302          Pain    Pretreatment Pain Rating 0/10 - no pain  -ES     Posttreatment Pain Rating 0/10 - no pain  -ES     Pre/Posttreatment Pain Comment tolerated  -ES     Pain Intervention(s) Repositioned;Ambulation/increased activity  -ES     Row Name 03/06/23 5001          Plan of Care Review    Plan of Care Reviewed With patient;spouse  -ES     Progress no change  PT IE  -ES     Outcome Evaluation PT eval completed. Pt presenting near functional baseline, ambulating 300' and navigating up/down 3 stairs with SBA and no AD. Pt demonstrated adequate safety awareness and sequencing throughout mobility. No IPPT services warranted this date, PT signing off. Recommend home with assist at d/c.  -ES     Row Name 03/06/23 1386          Therapy Assessment/Plan (PT)    Criteria for Skilled Interventions Met (PT) no;no problems identified which require skilled intervention  -ES     Therapy Frequency (PT) evaluation only  -ES     Row Name 03/06/23 1294          Vital Signs    Pre Systolic BP Rehab --  VSS  -ES     O2 Delivery Pre Treatment supplemental O2  -ES     O2 Delivery Intra Treatment supplemental O2  -ES     O2 Delivery Post Treatment supplemental O2  -ES     Pre Patient Position Sitting  -ES     Intra Patient Position Standing  -ES     Post Patient Position Sitting  -ES     Row Name 03/06/23 0654          Positioning and Restraints    Pre-Treatment Position in bed  -ES     Post Treatment Position chair  -ES     In Chair notified nsg;reclined;sitting;call light  within reach;encouraged to call for assist;with family/caregiver;waffle cushion;legs elevated  no alarm box in room. RN notified  -ES           User Key  (r) = Recorded By, (t) = Taken By, (c) = Cosigned By    Initials Name Provider Type    Cinda Barnes PT Physical Therapist               Outcome Measures     Row Name 03/06/23 [facility]          How much help from another person do you currently need...    Turning from your back to your side while in flat bed without using bedrails? 4  -ES     Moving from lying on back to sitting on the side of a flat bed without bedrails? 4  -ES     Moving to and from a bed to a chair (including a wheelchair)? 4  -ES     Standing up from a chair using your arms (e.g., wheelchair, bedside chair)? 4  -ES     Climbing 3-5 steps with a railing? 3  -ES     To walk in hospital room? 4  -ES     AM-PAC 6 Clicks Score (PT) 23  -ES     Highest level of mobility 7 --> Walked 25 feet or more  -ES     Row Name 03/06/23 [facility]          Functional Assessment    Outcome Measure Options AM-PAC 6 Clicks Basic Mobility (PT)  -ES           User Key  (r) = Recorded By, (t) = Taken By, (c) = Cosigned By    Initials Name Provider Type    Cinda Barnes PT Physical Therapist              Physical Therapy Education     Title: PT OT SLP Therapies (In Progress)     Topic: Physical Therapy (In Progress)     Point: Mobility training (Done)     Learning Progress Summary           Patient Acceptance, E,TB, VU,DU by ES at 3/6/2023 1357   Significant Other Acceptance, E,TB, VU,DU by ES at 3/6/2023 1357                   Point: Home exercise program (Not Started)     Learner Progress:  Not documented in this visit.          Point: Body mechanics (Done)     Learning Progress Summary           Patient Acceptance, E,TB, VU,DU by ES at 3/6/2023 1357   Significant Other Acceptance, E,TB, VU,DU by ES at 3/6/2023 1357                   Point: Precautions (Done)     Learning Progress Summary           Patient  Acceptance, E,TB, VU,DU by KIM at 3/6/2023 1357   Significant Other Acceptance, E,TB, VU,DU by  at 3/6/2023 1357                               User Key     Initials Effective Dates Name Provider Type Discipline     08/11/22 -  Cinda Ruano PT Physical Therapist PT              PT Recommendation and Plan     Plan of Care Reviewed With: patient, spouse  Progress: no change (PT IE)  Outcome Evaluation: PT eval completed. Pt presenting near functional baseline, ambulating 300' and navigating up/down 3 stairs with SBA and no AD. Pt demonstrated adequate safety awareness and sequencing throughout mobility. No IPPT services warranted this date, PT signing off. Recommend home with assist at d/c.     Time Calculation:    PT Charges     Row Name 03/06/23 1358             Time Calculation    Start Time 1320  -ES      PT Received On 03/06/23  -ES         Untimed Charges    PT Eval/Re-eval Minutes 47  -ES         Total Minutes    Untimed Charges Total Minutes 47  -ES       Total Minutes 47  -ES            User Key  (r) = Recorded By, (t) = Taken By, (c) = Cosigned By    Initials Name Provider Type    Cinda Barnes, PT Physical Therapist              Therapy Charges for Today     Code Description Service Date Service Provider Modifiers Qty    75482410118 HC PT EVAL LOW COMPLEXITY 4 3/6/2023 Cinda Ruano PT GP 1          PT G-Codes  Outcome Measure Options: AM-PAC 6 Clicks Basic Mobility (PT)  AM-PAC 6 Clicks Score (PT): 23    PT Discharge Summary  Anticipated Discharge Disposition (PT): home with assist    Cinda Ruano PT  3/6/2023

## 2023-03-06 NOTE — CONSULTS
General Surgery Consultation Note    Date of Service: 3/6/2023  Miguel Camejo  [id number]  1945      Referring Provider: Paul Castro MD    Location of Consult: Inpatient     Reason for Consultation: Inguinal adenopathy       History of Present Illness:  I am seeing, Miguel Camejo, in consultation at request of Paul Castro MD regarding inguinal adenopathy.  He is a 78-year-old gentleman with history of congestive heart failure, diabetes, COPD, and pancytopenia who was admitted to [facility] with complaints of shortness of breath.  He reports that he has had difficulty breathing and worsening shortness of air for the last 2 weeks.  He reports that this is particular worse when he bends over or performs any sort of exertion.  He reports some chest pressure associated with this.  He has previously followed with Dr Herrera for evaluation of pancytopenia.  He apparently has had some low-grade temps over the last 2 to 3 weeks.  He was seen by Dr. Herrera last week and it was felt that his pancytopenia may be related to portal hypertension and he was actually not planned to have further follow-up with them.  Yesterday he presented to our emergency department due to concerns for or shortness of breath and was admitted for heart failure exacerbation.  As part of work-up for this, he underwent a CT of the abdomen and pelvis which noted some bilateral inguinal adenopathy.  He reports that he has not been able to palpate any sort of lump or nodule in his groin, and he has not noted this.  He takes aspirin but no other anticoagulants.  He is being followed by hospital medicine as well as cardiology for CHF exacerbation.      Problems Addressed this Visit        Cardiac and Vasculature    * (Principal) Acute on chronic congestive heart failure, unspecified heart failure type (HCC) - Primary   Other Visit Diagnoses     Acute on chronic respiratory failure with hypoxia (HCC)        Peripheral edema         Mild renal insufficiency          Diagnoses       Codes Comments    Acute on chronic congestive heart failure, unspecified heart failure type ([facility])    -  Primary ICD-10-CM: I50.9  ICD-9-CM: 428.0     Acute on chronic respiratory failure with hypoxia ([facility])     ICD-10-CM: J96.21  ICD-9-CM: 518.84, 799.02     Peripheral edema     ICD-10-CM: R60.9  ICD-9-CM: 782.3     Mild renal insufficiency     ICD-10-CM: N28.9  ICD-9-CM: 593.9           PMHx:  Past Medical History:   Diagnosis Date   • Arthritis    • Asthma    • Bowel trouble    • CHF (congestive heart failure) ([facility])    • Chondrocalcinosis of right knee    • Colitis    • COPD (chronic obstructive pulmonary disease) ([facility])    • Diabetes mellitus ([facility])    • Esophagitis    • Gout    • H/O bladder problems    • Heart murmur    • Hypertension    • IBS (irritable bowel syndrome)    • JONAH on CPAP    • Primary osteoarthritis of both knees    • Rheumatoid arthritis ([facility])    • Skin problem    • SOB (shortness of breath)        Past Surgical History:  Past Surgical History:   • COLONOSCOPY   • KNEE ARTHROSCOPY   • PARTIAL KNEE ARTHROPLASTY   • SKIN BIOPSY   • STOMACH SURGERY   • UPPER GASTROINTESTINAL ENDOSCOPY   • VASECTOMY       Allergies:  Allergies   Allergen Reactions   • Lisinopril Swelling   • Benazepril Swelling       Medications:  No current facility-administered medications on file prior to encounter.     Current Outpatient Medications on File Prior to Encounter   Medication Sig Dispense Refill   • allopurinol (ZYLOPRIM) 300 MG tablet Take  by mouth Take As Directed.     • amLODIPine (NORVASC) 10 MG tablet      • aspirin 81 MG EC tablet Take 1 tablet by mouth Daily.     • atorvastatin (LIPITOR) 40 MG tablet Take  by mouth Take As Directed.     • Baclofen 5 MG tablet Take 5 mg by mouth 3 (Three) Times a Day.     • Breo Ellipta 100-25 MCG/INH inhaler      • cetirizine (zyrTEC) 10 MG tablet Take 1 tablet by mouth Daily.     • doxazosin (CARDURA) 4 MG tablet Take 2  tablets by mouth Every Night.     • famotidine (PEPCID) 20 MG tablet Take 1 tablet by mouth 2 (Two) Times a Day.     • ferrous sulfate 325 (65 Fe) MG tablet Take 1 tablet by mouth Daily With Breakfast. OTC     • finasteride (PROSCAR) 5 MG tablet Take 1 tablet by mouth Daily.     • furosemide (LASIX) 40 MG tablet Take 1 tablet by mouth Take As Directed. 1 - 2 tablets a week     • hydrALAZINE (APRESOLINE) 25 MG tablet Take 2 tablets by mouth 2 (Two) Times a Day.     • Jardiance 10 MG tablet tablet Take 1 tablet by mouth Daily.     • losartan (COZAAR) 100 MG tablet Take 1 tablet by mouth Daily.     • metoprolol succinate XL (TOPROL-XL) 100 MG 24 hr tablet Take 1 tablet by mouth 2 (Two) Times a Day.     • minoxidil (LONITEN) 10 MG tablet Take 1 tablet by mouth 4 (Four) Times a Day.     • omeprazole (priLOSEC) 40 MG capsule Take 1 capsule by mouth Daily With Breakfast, Lunch & Dinner.     • oxybutynin (DITROPAN) 5 MG tablet Take 1 tablet by mouth 2 (Two) Times a Day.     • potassium chloride (K-DUR,KLOR-CON) 20 MEQ CR tablet Take 1 tablet by mouth Daily.     • tiotropium (SPIRIVA) 18 MCG per inhalation capsule Place 1 capsule into inhaler and inhale Every Morning.     • vitamin C (ASCORBIC ACID) 250 MG tablet Take 1 tablet by mouth 2 (Two) Times a Day. OTC     • [DISCONTINUED] ACCU-CHEK SARAHI PLUS test strip      • [DISCONTINUED] ACCU-CHEK SOFTCLIX LANCETS lancets      • [DISCONTINUED] baclofen (LIORESAL) 20 MG tablet Take 5 mg by mouth 3 (Three) Times a Day.     • [DISCONTINUED] ipratropium (ATROVENT) 0.03 % nasal spray      • albuterol sulfate  (90 Base) MCG/ACT inhaler Inhale Take As Directed.     • COLCRYS 0.6 MG tablet Take 1 tablet by mouth As Needed.     • folic acid (FOLVITE) 1 MG tablet Take 400 tablets by mouth Daily.     • methylcellulose, Laxative, (CITRUCEL) 500 MG tablet tablet Take 1 tablet by mouth 2 (Two) Times a Day.     • Probiotic Product (VISBIOME HIGH POTENCY PO) Take 112.5 mg by mouth 2  (Two) Times a Day. OTC     • [DISCONTINUED] Metoprolol Tartrate (LOPRESSOR PO) metoprolol tartrate     • [DISCONTINUED] Mirabegron ER (MYRBETRIQ) 25 MG tablet sustained-release 24 hour 24 hr tablet Myrbetriq 25 mg tablet,extended release   Take 1 tablet every day by oral route.     • [DISCONTINUED] oxybutynin XL (DITROPAN-XL) 5 MG 24 hr tablet      • [DISCONTINUED] vitamin C (ASCORBIC ACID) 250 MG tablet            Current Facility-Administered Medications:   •  acetaminophen (TYLENOL) tablet 650 mg, 650 mg, Oral, Q4H PRN, Nayan Brandon PA-C  •  dextrose (D50W) (25 g/50 mL) IV injection 25 g, 25 g, Intravenous, Q15 Min PRN, Nayan Brandon PA-C  •  dextrose (GLUTOSE) oral gel 15 g, 15 g, Oral, Q15 Min PRN, Nayan Brandon PA-C  •  glucagon (GLUCAGEN) injection 1 mg, 1 mg, Intramuscular, Q15 Min PRN, Nayan Brandon PA-C  •  Insulin Lispro (humaLOG) injection 0-9 Units, 0-9 Units, Subcutaneous, TID AC, Nayan Bradnon PA-C  •  ipratropium-albuterol (DUO-NEB) nebulizer solution 3 mL, 3 mL, Nebulization, 4x Daily - RT, Nayan Brandon PA-C  •  melatonin tablet 5 mg, 5 mg, Oral, Nightly PRN, Nayan Brandon PA-C, 5 mg at 03/06/23 0108  •  ondansetron (ZOFRAN) tablet 4 mg, 4 mg, Oral, Q6H PRN **OR** ondansetron (ZOFRAN) injection 4 mg, 4 mg, Intravenous, Q6H PRN, Nayan Brandon PA-C  •  Pharmacy Consult - MTM, , Does not apply, BID, Kayla Cooper, RPH  •  sodium chloride 0.9 % flush 10 mL, 10 mL, Intravenous, PRN, Ezequiel Lacey, DO  •  sodium chloride 0.9 % flush 10 mL, 10 mL, Intravenous, Q12H, Nayan Brandon PA-C, 10 mL at 03/06/23 0813  •  sodium chloride 0.9 % flush 10 mL, 10 mL, Intravenous, PRN, Nayan Brandon PA-C  •  sodium chloride 0.9 % infusion 40 mL, 40 mL, Intravenous, PRN, Nayan Brandon PA-C      Family History:  Family History   Problem Relation Age of Onset   • Hypertension Mother    • Cancer  "Mother    • Rheum arthritis Mother    • Osteoarthritis Mother    • Hypertension Father    • Heart attack Father    • Heart disease Father    • Hypertension Other    • Heart attack Other    • Heart disease Other    • Cancer Other    • Stroke Other    • BRCA 1/2 Neg Hx    • Breast cancer Neg Hx    • Ovarian cancer Neg Hx        Social History: Pt lives in [facility]   Tobacco use: Denies     EtOH use : Denies    Illicit drug use: Denies       Review of Systems:   Constitutional: No fevers, chills or malaise   Eyes: Denies visual changes    Cardiovascular: Denies chest pain, palpitations   Pulmonary: Denies cough or shortness of breath   Abdominal/ GI: See HPI    Genitourinary: Denies dysuria or hematuria   Musculoskeletal: Denies any but chronic joint aches, pains or deformities   Psychiatric: No recent mood changes   Neurologic: No paresthesias or loss of function    /70 (BP Location: Left arm, Patient Position: Lying)   Pulse 78   Temp 98.7 °F (37.1 °C) (Oral)   Resp 20   Ht 172.7 cm (67.99\")   Wt 109 kg (241 lb)   SpO2 97%   BMI 36.65 kg/m²   Body mass index is 36.65 kg/m².    Gen: Awake, alert, sitting up in bed camera no obvious distress, conversant, chronically ill-appearing  Head: Normocephalic, atraumatic.   Eyes: Pupils equal, round, react to light and accommodation.   Mouth: Oral mucosa without lesions,   Neck: No masses, lymphadenopathy or carotid bruits bilaterally   CV: Rhythm and rate regular, no murmurs, rubs or gallops  Lungs: Symmetric expansion, not labored on oxygen by nasal cannula  Abdomen: Obese, soft, no obvious distress to palpation  Groin : No obvious hernias bilaterally   Extremities:  No cyanosis, clubbing or edema bilaterally  Lymphatics: No abnormal lymphadenopathy appreciated.  Specific attention to the bilateral inguinal regions demonstrates no obvious palpable adenopathy  Neurologic: No gross deficits         CBC  Results from last 7 days   Lab Units 03/06/23  0727 "   WBC 10*3/mm3 3.04*   HEMOGLOBIN g/dL 9.6*   HEMATOCRIT % 29.8*   PLATELETS 10*3/mm3 146       CMP  Results from last 7 days   Lab Units 03/06/23  0727 03/05/23  1917   SODIUM mmol/L 143 145   POTASSIUM mmol/L 5.1 4.6   CHLORIDE mmol/L 107 111*   CO2 mmol/L 25.0 27.0   BUN mg/dL 32* 26*   CREATININE mg/dL 1.69* 1.49*   CALCIUM mg/dL 8.9 8.4*   BILIRUBIN mg/dL  --  0.5   ALK PHOS U/L  --  80   ALT (SGPT) U/L  --  15   AST (SGOT) U/L  --  19   GLUCOSE mg/dL 121* 104*       Radiology  Imaging Results (Last 72 Hours)     Procedure Component Value Units Date/Time    US Gallbladder [[id number]] Collected: 03/06/23 0925     Updated: 03/06/23 0931    Narrative:      US GALLBLADDER    Date of Exam: 3/6/2023 9:07 AM EST    Indication: Abdominal swelling, gallbladder wall thickening on CT.    Comparison: CT abdomen 3/5/2023    Technique: Grayscale and color Doppler ultrasound evaluation of the right upper quadrant was performed.    Findings:  Limited images of the pancreas are unremarkable.    Liver parenchyma is heterogeneous. There are multiple hepatic cysts. No suspicious solid appearing liver lesion identified. Hepatic vasculature is within normal limits.    Gallbladder is within normal limits in size. No gallstones are seen. There is no gallbladder wall thickening or pericholecystic fluid.        Common hepatic duct is within normal limits measuring 3 mm    The right kidney measures 10.3 cm in [id number] length. There is normal thickness and normal echogenicity of the renal parenchyma. There is no hydronephrosis.    Small right pleural effusion is noted.      Impression:      Impression:    1. Gallbladder appears within normal limits. No gall but are well thickening or pericholecystic fluid. No gallstones identified. No biliary tract obstruction.  2. Small right pleural effusion  3. Multiple hepatic cyst    Electronically Signed: Sunil Block    3/6/2023 9:28 AM EST    Workstation ID: [id number]    CT Angiogram Chest  Pulmonary Embolism [[id number]] Collected: 03/05/23 1926     Updated: 03/05/23 2137    Narrative:      EXAMINATION:  CTA CHEST, CT ABDOMEN AND PELVIS WITH IV CONTRAST     DATE OF EXAM: 3/5/2023 8:44 PM    HISTORY: chest pain , abdominal pain and distention    TECHNIQUE: CTA of the chest followed by routine CT abdomen and pelvis was performed following the intravenous administration of iodinated contrast. Sagittal, coronal and 3-D reformatted images were provided. CT dose lowering techniques were used, to   include: automated exposure control, adjustment for patient size, and or use of iterative reconstruction.    3-D MIP images were performed under concurrent supervision not requiring an independent workstation, in order to better visualize the vasculature.    COMPARISON: None    FINDINGS:    CHEST CTA:    Lungs:  Moderate peripheral and lobular septal thickening. There is also minor patchy groundglass in the upper lungs. Bilateral dependent opacities right lung greater than left. There is some calcific densities at the lung bases, possibly prior barium   aspiration. There are moderate emphysematous changes in the periphery of the right upper lobe.    Pleura: Small bilateral pleural effusions.    Mediastinum And Gillian: Top normal size mediastinal lymph nodes.    Pulmonary Arteries: Suboptimally opacified. No filling defect.    3-D images corroborate 2-D findings.    Cardiovascular: Small pericardial effusion. No thoracic aortic aneurysm or dissection . Minor coronary artery atherosclerotic calcifications.    Chest Wall:  Normal.     ABDOMEN/PELVIS:    Liver: There are a few small cysts. Other subcentimeter hypodensities too small to characterize.    Gallbladder/Biliary: Gallbladder lumen is distended. There is suggestion of a focal area of gallbladder wall edema. No radiopaque gallstone.    Pancreas: Normal.     Spleen: Normal.     Adrenal Glands: Normal.     Kidneys: Normal.    GI Tract: No bowel  dilation.    Mesentery/Peritoneum: Trace ascites.    Vasculature: Minor atherosclerotic calcifications.    Lymph Nodes: Bilateral enlarged inguinal lymph nodes which retains fatty edgar and reniform shape    Abdominal Wall: Minor body wall edema.    Bladder:  Decompressed    Reproductive: Normal.     MUSCULOSKELETAL: Degenerative changes in the spine        Impression:        1.  Moderate CHF/volume overload including small bilateral pleural effusions, small pericardial effusion, and pulmonary interstitial edema. In this setting the minor patchy groundglass pulmonary opacities likely represent alveolar edema. Recommend   follow-up chest CT in six months to ensure resolution.  2.  Gallbladder luminal distention and suspected focal wall thickening. Recommend further evaluation with ultrasound or HIDA for acute cholecystitis.  3.  Enlarged bilateral inguinal lymph nodes, favored to be reactive given morphology. Correlate for lower extremity infection.  4.  No evidence of pulmonary embolus.  5.  Chronic findings as above.    Electronically signed by:  Dario Voss M.D.    3/5/2023 7:36 PM Mountain Time    CT Abdomen Pelvis With Contrast [[id number]] Collected: 03/05/23 1926     Updated: 03/05/23 2137    Narrative:      EXAMINATION:  CTA CHEST, CT ABDOMEN AND PELVIS WITH IV CONTRAST     DATE OF EXAM: 3/5/2023 8:44 PM    HISTORY: chest pain , abdominal pain and distention    TECHNIQUE: CTA of the chest followed by routine CT abdomen and pelvis was performed following the intravenous administration of iodinated contrast. Sagittal, coronal and 3-D reformatted images were provided. CT dose lowering techniques were used, to   include: automated exposure control, adjustment for patient size, and or use of iterative reconstruction.    3-D MIP images were performed under concurrent supervision not requiring an independent workstation, in order to better visualize the vasculature.    COMPARISON: None    FINDINGS:    CHEST  CTA:    Lungs:  Moderate peripheral and lobular septal thickening. There is also minor patchy groundglass in the upper lungs. Bilateral dependent opacities right lung greater than left. There is some calcific densities at the lung bases, possibly prior barium   aspiration. There are moderate emphysematous changes in the periphery of the right upper lobe.    Pleura: Small bilateral pleural effusions.    Mediastinum And Gillian: Top normal size mediastinal lymph nodes.    Pulmonary Arteries: Suboptimally opacified. No filling defect.    3-D images corroborate 2-D findings.    Cardiovascular: Small pericardial effusion. No thoracic aortic aneurysm or dissection . Minor coronary artery atherosclerotic calcifications.    Chest Wall:  Normal.     ABDOMEN/PELVIS:    Liver: There are a few small cysts. Other subcentimeter hypodensities too small to characterize.    Gallbladder/Biliary: Gallbladder lumen is distended. There is suggestion of a focal area of gallbladder wall edema. No radiopaque gallstone.    Pancreas: Normal.     Spleen: Normal.     Adrenal Glands: Normal.     Kidneys: Normal.    GI Tract: No bowel dilation.    Mesentery/Peritoneum: Trace ascites.    Vasculature: Minor atherosclerotic calcifications.    Lymph Nodes: Bilateral enlarged inguinal lymph nodes which retains fatty gillian and reniform shape    Abdominal Wall: Minor body wall edema.    Bladder:  Decompressed    Reproductive: Normal.     MUSCULOSKELETAL: Degenerative changes in the spine        Impression:        1.  Moderate CHF/volume overload including small bilateral pleural effusions, small pericardial effusion, and pulmonary interstitial edema. In this setting the minor patchy groundglass pulmonary opacities likely represent alveolar edema. Recommend   follow-up chest CT in six months to ensure resolution.  2.  Gallbladder luminal distention and suspected focal wall thickening. Recommend further evaluation with ultrasound or HIDA for acute  cholecystitis.  3.  Enlarged bilateral inguinal lymph nodes, favored to be reactive given morphology. Correlate for lower extremity infection.  4.  No evidence of pulmonary embolus.  5.  Chronic findings as above.    Electronically signed by:  Dario Voss M.D.    3/5/2023 7:36 PM Mountain Time    XR Chest 1 View [[id number]] Collected: 03/05/23 1946     Updated: 03/05/23 1950    Narrative:      XR CHEST 1 VW    Date of Exam: 3/5/2023 7:06 PM EST    Indication: SOA triage protocol.    Comparison: CT chest 5/18/2020     Findings:  Lungs are normally expanded. Severe cardiomegaly. Airspace opacity in lung bases and right middle lobe. No pneumothorax.      Impression:      Impression:  Multifocal airspace opacity right lung worse than left with cardiomegaly. Multifocal pneumonia is favored over pulmonary edema.    Electronically Signed: Francoise Hightower    3/5/2023 7:47 PM EST    Workstation ID: [id number]              Results Review: I have personally reviewed all of the recent lab and imaging results available at this time.     Vital signs are stable.  On 3 L of oxygen by nasal cannula    Labs demonstrate leukopenia with a white blood cell count of 3.  Hemoglobin is 9.6.  Platelets are 146.  [place] is elevated at 1.6    I have personally reviewed a CT scan of the abdomen and pelvis.  This is largely unremarkable.  There did appear to be some enlarged lymph nodes in the bilateral inguinal regions that have a large fatty hilum    Assessment:  Mr. Camejo is a 78-year-old gentleman with history of congestive heart failure, diabetes, COPD, and pancytopenia who was admitted to [facility] with complaints of shortness of air and is being treated for respiratory failure and congestive heart failure who was incidentally noted to have evidence of inguinal adenopathy on imaging who I was asked to evaluate for potential lymph node biopsy.  I discussed his case with Dr. Herrera on the telephone.  His pancytopenia is a  longstanding issue, and due to findings of inguinal adenopathy he does feel that lymph node biopsy is of value at some point to further investigate this.  This is not urgent or emergent.  Given his admission for respiratory failure and heart failure symptoms, we feel that he would likely be better served by interventional radiology for biopsy of 1 of these lymph node sites so as to potentially avoid general anesthesia.  If core biopsy is not definitive, then we can consider excisional lymph node biopsy in the future    Plan:  -Discussed with Dr. Herrera. Would recommend to pursue core lymph node biopsy by IR. If core biopsy is not of value, than we can consider open excisional lymph node biopsy under general anesthesia in the future when his respiratory failure/CHF symptoms are improved       I discussed the patient's findings and my recommendations with the patient and/or family, as well as the primary team     Mikel Coleman MD  03/06/23  12:10 EST      Part of this note may be an electronic transcription/translation of spoken language to printed text using the Dragon Dictation System.

## 2023-03-06 NOTE — PLAN OF CARE
Goal Outcome Evaluation:  Plan of Care Reviewed With: patient, spouse        Progress: no change (PT IE)  Outcome Evaluation: PT eval completed. Pt presenting near functional baseline, ambulating 300' and navigating up/down 3 stairs with SBA and no AD. Pt demonstrated adequate safety awareness and sequencing throughout mobility. No IPPT services warranted this date, PT signing off. Recommend home with assist at d/c.

## 2023-03-06 NOTE — PLAN OF CARE
Goal Outcome Evaluation:  Plan of Care Reviewed With: patient, spouse        Progress: no change  Outcome Evaluation: OT eval complete. Pt presents at baseline function with all transfers, functional mobility, and ADLS. No deficits that require skilled OT services. Recommend d/c home with family.

## 2023-03-06 NOTE — ED PROVIDER NOTES
[place]    EMERGENCY DEPARTMENT ENCOUNTER      Pt Name: Miguel Camejo  MRN: [id number]  YOB: 1945  Date of evaluation: 3/5/2023  Provider: Ezequiel aLcey DO    CHIEF COMPLAINT       Chief Complaint   Patient presents with   • Shortness of Breath         HISTORY OF PRESENT ILLNESS  (Location/Symptom, Timing/Onset, Context/Setting, Quality, Duration, Modifying Factors, Severity.)   Miguel Camejo is a 78 y.o. male who presents to the emergency department via EMS for evaluation of shortness of breath dyspnea cough and congestion which been worsening over the last few days.  Has a history of COPD emphysema.  He states he has some inhalers at home which she has been using, uses some type of oxygen mask in the evenings.  He also notes some increased fullness throughout his abdomen and increased swelling in his lower extremities.  He is on Lasix believes 20 mg just a few times per week.  He does some swelling in his bilateral lower extremities which she has been on and off but no known history of ascites or liver disease.  Patient denies any fever or chills, no chest pain.  Denies any other acute systemic complaints at this time.      Nursing notes were reviewed.      PAST MEDICAL HISTORY     Past Medical History:   Diagnosis Date   • Arthritis    • Asthma    • Bowel trouble    • Chondrocalcinosis of right knee    • Colitis    • COPD (chronic obstructive pulmonary disease) (HCC)    • Diabetes mellitus (HCC)    • Esophagitis    • Gout    • H/O bladder problems    • Heart murmur    • Hypertension    • IBS (irritable bowel syndrome)    • JONAH on CPAP    • Primary osteoarthritis of both knees    • Rheumatoid arthritis (HCC)    • Skin problem    • SOB (shortness of breath)          SURGICAL HISTORY       Past Surgical History:   Procedure Laterality Date   • COLONOSCOPY     • KNEE ARTHROSCOPY Left    • PARTIAL KNEE ARTHROPLASTY Left    • SKIN BIOPSY     • STOMACH SURGERY     • UPPER  GASTROINTESTINAL ENDOSCOPY     • VASECTOMY           CURRENT MEDICATIONS       Current Facility-Administered Medications:   •  sodium chloride 0.9 % flush 10 mL, 10 mL, Intravenous, PRN, Ezequiel Lacey DO  •  vancomycin 2250 mg/500 mL 0.9% NS IVPB ([facility]), 20 mg/kg, Intravenous, Once, Ezequiel Lacey DO, Last Rate: 222.2 mL/hr at 03/05/23 2144, 2,250 mg at 03/05/23 2144    Current Outpatient Medications:   •  ACCU-CHEK SARAHI PLUS test strip, , Disp: , Rfl:   •  ACCU-CHEK SOFTCLIX LANCETS lancets, , Disp: , Rfl:   •  albuterol sulfate  (90 Base) MCG/ACT inhaler, Inhale Take As Directed., Disp: , Rfl:   •  allopurinol (ZYLOPRIM) 300 MG tablet, Take  by mouth Take As Directed., Disp: , Rfl:   •  amLODIPine (NORVASC) 10 MG tablet, , Disp: , Rfl:   •  aspirin 81 MG EC tablet, Take 81 mg by mouth Daily., Disp: , Rfl:   •  atorvastatin (LIPITOR) 40 MG tablet, Take  by mouth Take As Directed., Disp: , Rfl:   •  baclofen (LIORESAL) 20 MG tablet, Take 20 mg by mouth 3 (Three) Times a Day., Disp: , Rfl:   •  Baclofen 5 MG tablet, , Disp: , Rfl:   •  Breo Ellipta 100-25 MCG/INH inhaler, , Disp: , Rfl:   •  COLCRYS 0.6 MG tablet, TK 2 TS PO NOW AND 1 T IN ONE HOUR MAY REPEAT AT 24 HOUR INTERVALS, Disp: , Rfl:   •  doxazosin (CARDURA) 4 MG tablet, , Disp: , Rfl:   •  famotidine (PEPCID) 20 MG tablet, Take 20 mg by mouth 2 (Two) Times a Day., Disp: , Rfl:   •  finasteride (PROSCAR) 5 MG tablet, , Disp: , Rfl:   •  folic acid (FOLVITE) 1 MG tablet, Take 400 mg by mouth Take As Directed., Disp: , Rfl:   •  furosemide (LASIX) 40 MG tablet, , Disp: , Rfl:   •  hydrALAZINE (APRESOLINE) 25 MG tablet, , Disp: , Rfl:   •  ipratropium (ATROVENT) 0.03 % nasal spray, , Disp: , Rfl:   •  Jardiance 10 MG tablet tablet, , Disp: , Rfl:   •  losartan (COZAAR) 100 MG tablet, Take  by mouth Take As Directed., Disp: , Rfl:   •  metoprolol succinate XL (TOPROL-XL) 100 MG 24 hr tablet, Take  by mouth Take As Directed., Disp: , Rfl:    •  Metoprolol Tartrate (LOPRESSOR PO), metoprolol tartrate, Disp: , Rfl:   •  minoxidil (LONITEN) 10 MG tablet, Take  by mouth Take As Directed., Disp: , Rfl:   •  Mirabegron ER (MYRBETRIQ) 25 MG tablet sustained-release 24 hour 24 hr tablet, Myrbetriq 25 mg tablet,extended release  Take 1 tablet every day by oral route., Disp: , Rfl:   •  omeprazole (priLOSEC) 40 MG capsule, Take  by mouth Take As Directed., Disp: , Rfl:   •  oxybutynin (DITROPAN) 5 MG tablet, , Disp: , Rfl:   •  oxybutynin XL (DITROPAN-XL) 5 MG 24 hr tablet, , Disp: , Rfl:   •  potassium chloride (K-DUR,KLOR-CON) 20 MEQ CR tablet, Take 1 tablet by mouth Daily., Disp: , Rfl:   •  tiotropium (SPIRIVA) 18 MCG per inhalation capsule, Place  into inhaler and inhale Take As Directed., Disp: , Rfl:   •  vitamin C (ASCORBIC ACID) 250 MG tablet, , Disp: , Rfl:     ALLERGIES     Lisinopril and Benazepril    FAMILY HISTORY       Family History   Problem Relation Age of Onset   • Hypertension Mother    • Cancer Mother    • Rheum arthritis Mother    • Osteoarthritis Mother    • Hypertension Father    • Heart attack Father    • Heart disease Father    • Hypertension Other    • Heart attack Other    • Heart disease Other    • Cancer Other    • Stroke Other    • BRCA 1/2 Neg Hx    • Breast cancer Neg Hx    • Ovarian cancer Neg Hx           SOCIAL HISTORY       Social History     Socioeconomic History   • Marital status:    Tobacco Use   • Smoking status: Former     Packs/day: 2.00     Years: 36.00     Pack years: 72.00     Types: Cigarettes   • Smokeless tobacco: Never   Substance and Sexual Activity   • Alcohol use: Yes     Comment: occasional   • Drug use: No   • Sexual activity: Defer         PHYSICAL EXAM    (up to 7 for level 4, 8 or more for level 5)     Vitals:    03/05/23 2100 03/05/23 2111 03/05/23 2113 03/05/23 2120   BP: 142/64 155/66  150/62   BP Location:       Patient Position:       Pulse: 79 76 76 80   Resp:   24    Temp:       TempSrc:        SpO2: 93% 96% 95% 95%   Weight:       Height:           Physical Exam  General : Patient is awake, alert, oriented, in no acute distress, nontoxic appearing  HEENT: Pupils are equally round, EOMI, conjunctivae clear, there is no injection no icterus.  Oral mucosa is moist, uvula midline  Neck: Neck is supple, full range of motion, trachea midline  Cardiac: Heart regular rate, rhythm, no murmurs, rubs, or gallops  Lungs: Lungs decreased breath sounds bilaterally, expiratory wheezes noted diffusely throughout the lung fields.  Oxygen saturations 87% on room air, placed on 4 L nasal cannula.  Chest wall: There is no tenderness to palpation over the chest wall or over ribs  Abdomen: Abdomen is soft, moderately distended, no peritoneal signs examination.  Musculoskeletal: 1+ edema bilateral lower extremities, 5 out of 5 strength in all 4 extremities.  No focal muscle deficits are appreciated  Neuro: Motor intact, sensory intact, level of consciousness is normal  Dermatology: Skin is warm and dry  Psych: Mentation is grossly normal, cognition is grossly normal. Affect is appropriate      DIAGNOSTIC RESULTS     EKG:  All EKGs are interpreted by the Emergency Department Physician who either signs or Co-signs this chart in the absence of a cardiologist.    ECG 12 Lead ED Triage Standing Order; SOA   Final Result   Test Reason : ED Triage Standing Order~   Blood Pressure :   */*   mmHG   Vent. Rate :  80 BPM     Atrial Rate :  80 BPM      P-R Int : 158 ms          QRS Dur :  82 ms       QT Int : 386 ms       P-R-T Axes :  42  42  53 degrees      QTc Int : 445 ms      Normal sinus rhythm   Nonspecific T wave abnormality   Abnormal ECG   When compared with ECG of 19-MAY-2016 15:14,   No significant change was found   Confirmed by TYRA SINGH MD ([id number]) on 3/5/2023 7:13:50 PM      Referred By: EDMD           Confirmed By: TYRA SINGH MD          RADIOLOGY:     [] Radiologist's Report Reviewed:  CT Angiogram Chest  Pulmonary Embolism   Final Result      1.  Moderate CHF/volume overload including small bilateral pleural effusions, small pericardial effusion, and pulmonary interstitial edema. In this setting the minor patchy groundglass pulmonary opacities likely represent alveolar edema. Recommend    follow-up chest CT in six months to ensure resolution.   2.  Gallbladder luminal distention and suspected focal wall thickening. Recommend further evaluation with ultrasound or HIDA for acute cholecystitis.   3.  Enlarged bilateral inguinal lymph nodes, favored to be reactive given morphology. Correlate for lower extremity infection.   4.  No evidence of pulmonary embolus.   5.  Chronic findings as above.      Electronically signed by:  Dario Voss M.D.     3/5/2023 7:36 PM Mountain Time      CT Abdomen Pelvis With Contrast   Final Result      1.  Moderate CHF/volume overload including small bilateral pleural effusions, small pericardial effusion, and pulmonary interstitial edema. In this setting the minor patchy groundglass pulmonary opacities likely represent alveolar edema. Recommend    follow-up chest CT in six months to ensure resolution.   2.  Gallbladder luminal distention and suspected focal wall thickening. Recommend further evaluation with ultrasound or HIDA for acute cholecystitis.   3.  Enlarged bilateral inguinal lymph nodes, favored to be reactive given morphology. Correlate for lower extremity infection.   4.  No evidence of pulmonary embolus.   5.  Chronic findings as above.      Electronically signed by:  Dario Voss M.D.     3/5/2023 7:36 PM Mountain Time      XR Chest 1 View   Final Result   Impression:   Multifocal airspace opacity right lung worse than left with cardiomegaly. Multifocal pneumonia is favored over pulmonary edema.      Electronically Signed: Francoise Hightower     3/5/2023 7:47 PM EST     Workstation ID: [id number]       Gallbladder    (Results Pending)       I ordered and independently reviewed  the above noted radiographic studies.      I viewed images of chest x-ray which showed evidence of fluid overload per my independent interpretation.    See radiologist's dictation for official interpretation.      ED BEDSIDE ULTRASOUND:   Performed by ED Physician - none    LABS:    I have reviewed and interpreted all of the currently available lab results from this visit (if applicable):  Results for orders placed or performed during the hospital encounter of 03/05/23   COVID-19 and FLU A/B PCR - Swab, Nasopharynx    Specimen: Nasopharynx; Swab   Result Value Ref Range    COVID19 Not Detected Not Detected - Ref. Range    Influenza A PCR Not Detected Not Detected    Influenza B PCR Not Detected Not Detected   Comprehensive Metabolic Panel    Specimen: Blood   Result Value Ref Range    Glucose 104 (H) 65 - 99 mg/dL    BUN 26 (H) 8 - 23 mg/dL    Creatinine 1.49 (H) 0.76 - 1.27 mg/dL    Sodium 145 136 - 145 mmol/L    Potassium 4.6 3.5 - 5.2 mmol/L    Chloride 111 (H) 98 - 107 mmol/L    CO2 27.0 22.0 - 29.0 mmol/L    Calcium 8.4 (L) 8.6 - 10.5 mg/dL    Total Protein 6.8 6.0 - 8.5 g/dL    Albumin 3.8 3.5 - 5.2 g/dL    ALT (SGPT) 15 1 - 41 U/L    AST (SGOT) 19 1 - 40 U/L    Alkaline Phosphatase 80 39 - 117 U/L    Total Bilirubin 0.5 0.0 - 1.2 mg/dL    Globulin 3.0 gm/dL    A/G Ratio 1.3 g/dL    BUN/Creatinine Ratio 17.4 7.0 - 25.0    Anion Gap 7.0 5.0 - 15.0 mmol/L    eGFR 47.7 (L) >60.0 mL/min/1.73   BNP    Specimen: Blood   Result Value Ref Range    proBNP 2,179.0 (H) 0.0 - 1,800.0 pg/mL   Single High Sensitivity Troponin T    Specimen: Blood   Result Value Ref Range    HS Troponin T 53 (C) <15 ng/L   CBC Auto Differential    Specimen: Blood   Result Value Ref Range    WBC 3.85 3.40 - 10.80 10*3/mm3    RBC 3.16 (L) 4.14 - 5.80 10*6/mm3    Hemoglobin 9.5 (L) 13.0 - 17.7 g/dL    Hematocrit 29.4 (L) 37.5 - 51.0 %    MCV 93.0 79.0 - 97.0 fL    MCH 30.1 26.6 - 33.0 pg    MCHC 32.3 31.5 - 35.7 g/dL    RDW 13.5 12.3 - 15.4 %     RDW-SD 45.7 37.0 - 54.0 fl    MPV 9.7 6.0 - 12.0 fL    Platelets 124 (L) 140 - 450 10*3/mm3    Neutrophil % 61.7 42.7 - 76.0 %    Lymphocyte % 23.1 19.6 - 45.3 %    Monocyte % 10.1 5.0 - 12.0 %    Eosinophil % 3.6 0.3 - 6.2 %    Basophil % 1.0 0.0 - 1.5 %    Immature Grans % 0.5 0.0 - 0.5 %    Neutrophils, Absolute 2.37 1.70 - 7.00 10*3/mm3    Lymphocytes, Absolute 0.89 0.70 - 3.10 10*3/mm3    Monocytes, Absolute 0.39 0.10 - 0.90 10*3/mm3    Eosinophils, Absolute 0.14 0.00 - 0.40 10*3/mm3    Basophils, Absolute 0.04 0.00 - 0.20 10*3/mm3    Immature Grans, Absolute 0.02 0.00 - 0.05 10*3/mm3    nRBC 0.0 0.0 - 0.2 /100 WBC   Lactic Acid, Plasma    Specimen: Blood   Result Value Ref Range    Lactate 0.9 0.5 - 2.0 mmol/L   Procalcitonin    Specimen: Blood   Result Value Ref Range    Procalcitonin 0.07 0.00 - 0.25 ng/mL   Blood Gas, Arterial With Co-Ox    Specimen: Arterial Blood   Result Value Ref Range    Site Right Radial     Jas's Test Positive     pH, Arterial 7.315 (L) 7.350 - 7.450 pH units    pCO2, Arterial 53.2 (H) 35.0 - 45.0 mm Hg    pO2, Arterial 74.8 (L) 83.0 - 108.0 mm Hg    HCO3, Arterial 27.1 (H) 20.0 - 26.0 mmol/L    Base Excess, Arterial 0.3 0.0 - 2.0 mmol/L    Hemoglobin, Blood Gas 9.8 (L) 13.5 - 17.5 g/dL    Hematocrit, Blood Gas 30.1 (L) 38.0 - 51.0 %    Oxyhemoglobin 93.9 (L) 94 - 99 %    Methemoglobin 0.20 0.00 - 1.50 %    Carboxyhemoglobin 1.8 0 - 2 %    CO2 Content 28.7 22 - 33 mmol/L    Temperature 37.0 C    Barometric Pressure for Blood Gas      Modality Nasal Cannula     FIO2 32 %    Rate 0 Breaths/minute    PIP 0 cmH2O    IPAP 0     EPAP 0     Note      pH, Temp Corrected 7.315 pH Units    pCO2, Temperature Corrected 53.2 (H) 35 - 48 mm Hg    pO2, Temperature Corrected 74.8 (L) 83 - 108 mm Hg   ECG 12 Lead ED Triage Standing Order; SOA   Result Value Ref Range    QT Interval 386 ms    QTC Interval 445 ms   Green Top (Gel)   Result Value Ref Range    Extra Tube Hold for add-ons.     Lavender Top   Result Value Ref Range    Extra Tube hold for add-on    Gold Top - SST   Result Value Ref Range    Extra Tube Hold for add-ons.    Light Blue Top   Result Value Ref Range    Extra Tube Hold for add-ons.         If labs were ordered, I independently reviewed the results and considered them in treating the patient.      EMERGENCY DEPARTMENT COURSE and DIFFERENTIAL DIAGNOSIS/MDM:   Vitals:  AS OF 21:54 EST    BP - 150/62  HR - 80  TEMP - 99.7 °F (37.6 °C) (Oral)  O2 SATS - 95%      Orders placed during this visit:  Orders Placed This Encounter   Procedures   • Blood Culture - Blood,   • Blood Culture - Blood,   • COVID PRE-OP / PRE-PROCEDURE SCREENING ORDER (NO ISOLATION) - Swab, Nasopharynx   • COVID-19 and FLU A/B PCR - Swab, Nasopharynx   • XR Chest 1 View   • CT Angiogram Chest Pulmonary Embolism   • CT Abdomen Pelvis With Contrast   • US Gallbladder   • [place] Draw   • Comprehensive Metabolic Panel   • BNP   • Single High Sensitivity Troponin T   • CBC Auto Differential   • Blood Gas, Arterial -With Co-Ox Panel: Yes   • Lactic Acid, Plasma   • Procalcitonin   • Blood Gas, Arterial With Co-Ox   • NPO Diet NPO Type: Strict NPO   • Undress & Gown   • Cardiac Monitoring   • Continuous Pulse Oximetry   • Vital Signs   • Oxygen Therapy- Nasal Cannula; 2 LPM; Titrate for SPO2: equal to or greater than, 92%   • ECG 12 Lead ED Triage Standing Order; SOA   • Insert Peripheral IV   • CBC & Differential   • Green Top (Gel)   • Lavender Top   • Gold Top - SST   • Gray Top   • Light Blue Top       All labs have been independently reviewed by me.  All radiology studies have been reviewed by me and the radiologist dictating the report.  All EKG's have been independently viewed and interpreted by me.      Discussion below represents my analysis of pertinent findings related to patient's condition, differential diagnosis, treatment plan and final disposition.    Differential diagnosis:  The differential diagnosis  associated with the patient's presentation includes: COPD exacerbation, CHF exacerbation, ascites, electrolyte abnormalities, pneumonia, respiratory failure hypoxia    Additional sources  Discussed/ obtained information from independent historians:   [] Spouse  [] Parent  [] Family member  [] Friend  [x] EMS   [] Other:    External (non-ED) record review:   [x] Inpatient record:   [] Office record:   [x] Outpatient record:   [] Prior Outpatient labs:   [] Prior Outpatient radiology:   [] Primary Care record:   [] Outside ED record:   [] Other:     Patient's care impacted by:   [] Diabetes  [] Hypertension  [] Hyperlipidemia  [] Coronary Artery Disease   [x] COPD   [] Cancer   [] Tobacco Abuse   [] Substance Abuse    [] Other:     Care significantly affected by Social Determinants of Health (housing and economic circumstances, unemployment)    [] Yes     [x] No   If yes, Patient's care significantly limited by Social Determinants of Health including:   [] Inadequate housing   [] Low income   [] Alcoholism and drug addiction in family   [] Problems related to primary support group   [] Unemployment   [] Problems related to employment   [] Other Social Determinants of Health:       MEDICATIONS ADMINISTERED IN ED:  Medications   sodium chloride 0.9 % flush 10 mL (has no administration in time range)   vancomycin 2250 mg/500 mL 0.9% NS IVPB ([facility]) (2,250 mg Intravenous New Bag 3/5/23 2144)   ipratropium-albuterol (DUO-NEB) nebulizer solution 3 mL (3 mL Nebulization Given 3/5/23 2110)   methylPREDNISolone sodium succinate (SOLU-Medrol) injection 125 mg (125 mg Intravenous Given 3/5/23 1932)   bumetanide (BUMEX) injection 2 mg (2 mg Intravenous Given 3/5/23 1932)   piperacillin-tazobactam (ZOSYN) 3.375 g in iso-osmotic dextrose 50 ml (premix) (0 g Intravenous Stopped 3/5/23 2147)   iopamidol (ISOVUE-370) 76 % injection 100 mL (100 mL Intravenous Given 3/5/23 2046)              This is a very pleasant 78-year-old male who  presents with shortness of breath dyspnea, wheezing with abdominal distention.  He does have a history of COPD, peripheral edema.  Initial oxygen saturations are 87% on room air.  He was placed on 4L nasal cannula, we will move forward with treatment for COPD exacerbation, also treat for fluid retention, diuresis with Bumex 2 mg IV.  Advanced imaging of the chest abdomen and pelvis also obtained for further assessment.  Results as above.  ABG pH 7.31, PCO2 of 53 with a PO2 of 75, bicarb of 27.  Patient has a normal procalcitonin and lactate, no signs of acute sepsis.  He has mild renal insufficiency with a creatinine of 1.49.  BNP of 2179, high-sensitivity troponin 53.  Initial chest x-ray with possible bilateral pneumonia, clinically the patient is more of fluid overload picture.  CT PE study, abdomen/pelvis with no pulmonary embolism, no signs of acute pneumonia, appears to have diffuse fluid overload which we will continue with IV diuresis, likely back off of the antibiotics as no signs of sepsis or pneumonia present at this time.  We will plan for admission for acute on chronic heart failure, dyspnea, hypoxia for further work-up and assessment.  Patient has seen cardiologist at the [facility] in the past.  Unsure of recent cardiac echo.  Case discussed with hospitalist, Dr. Tamayo, for admission.        PROCEDURES:  Procedures    CRITICAL CARE TIME    Total Critical Care time was 0 minutes, excluding separately reportable procedures.   There was a high probability of clinically significant/life threatening deterioration in the patient's condition which required my urgent intervention.      FINAL IMPRESSION      1. Acute on chronic congestive heart failure, unspecified heart failure type (HCC)    2. Acute on chronic respiratory failure with hypoxia (HCC)    3. Peripheral edema    4. Mild renal insufficiency          DISPOSITION/PLAN     ED Disposition     ED Disposition   Decision to Admit     Condition   --    Comment   --               Comment: Please note this report has been produced using speech recognition software.      Ezequiel Lacey DO  Attending Emergency Physician       Ezequiel Lacey DO  03/05/23 0176

## 2023-03-06 NOTE — H&P
"    [facility] Medicine Services  HISTORY AND PHYSICAL    Patient Name: Miguel Camejo  : 1945  MRN: [id number]  Primary Care Physician: Cuong Hairston MD  Date of admission: 3/5/2023    Subjective   Subjective     Chief Complaint:  Dyspnea, LE Edema    HPI:  Miguel Camejo is a 78 y.o. male with a history of CHF, Rheumatoid arthritis, HTN, HLD, T2DM, COPD, JONAH (CPAP compliant), chronic pancytopenia and GERD who presents to [facility] ED for complaint of worsening dyspnea, as well as worsening bilateral lower extremity edema.  He states is been going on for the last 5 or 6 days.  He reports that he has beginning much more short of breath with exertion, particularly with climbing stairs.  He also states that he does sleep in a recliner most of the time.  He also notes some \"chest pressure\" that has been fairly constant over the last several days.  He also reports a chronic cough, but thinks it has somewhat gotten worse over the last several days as well.  He denies palpitation, abdominal pain, nausea, vomiting, diarrhea.    Of note, he follows Dr. Herrera for a chronic pancytopenia.  He also notes that at last week's visit, he was found to have a temperature of 100.7.  He denies \"feeling sick\".    He is a former smoker (quit 20 years ago), denies alcohol abuse or illicit drug use.        Review of Systems   Constitutional: Positive for fatigue and unexpected weight change (gain of around 20 lbs in the last few weeks. ). Negative for chills and fever.   HENT: Negative for nosebleeds, sore throat and trouble swallowing.    Eyes: Negative for photophobia and visual disturbance.   Respiratory: Positive for cough (chronic) and shortness of breath. Negative for wheezing.    Cardiovascular: Positive for chest pain and leg swelling.   Gastrointestinal: Negative for abdominal pain, diarrhea, nausea and vomiting.   Genitourinary: Negative for dysuria and hematuria.   Musculoskeletal: " Positive for arthralgias (chronic).   Skin: Negative.    Neurological: Negative for tremors, syncope, speech difficulty and weakness.   Psychiatric/Behavioral: Negative for confusion. The patient is not nervous/anxious.       All other systems reviewed and are negative.     Personal History     Past Medical History:   Diagnosis Date   • Arthritis    • Asthma    • Bowel trouble    • Chondrocalcinosis of right knee    • Colitis    • COPD (chronic obstructive pulmonary disease) (HCC)    • Diabetes mellitus (HCC)    • Esophagitis    • Gout    • H/O bladder problems    • Heart murmur    • Hypertension    • IBS (irritable bowel syndrome)    • JONAH on CPAP    • Primary osteoarthritis of both knees    • Rheumatoid arthritis (HCC)    • Skin problem    • SOB (shortness of breath)              Past Surgical History:   Procedure Laterality Date   • COLONOSCOPY     • KNEE ARTHROSCOPY Left    • PARTIAL KNEE ARTHROPLASTY Left    • SKIN BIOPSY     • STOMACH SURGERY     • UPPER GASTROINTESTINAL ENDOSCOPY     • VASECTOMY         Family History:  family history includes Cancer in his mother and another family member; Heart attack in his father and another family member; Heart disease in his father and another family member; Hypertension in his father, mother, and another family member; Osteoarthritis in his mother; Rheum arthritis in his mother; Stroke in an other family member. Otherwise pertinent FHx was reviewed and unremarkable.     Social History:  reports that he has quit smoking. His smoking use included cigarettes. He has a 72.00 pack-year smoking history. He has never used smokeless tobacco. He reports current alcohol use. He reports that he does not use drugs.  Social History     Social History Narrative   • Not on file       Medications:  Accu-Chek Softclix Lancets, Baclofen, Fluticasone Furoate-Vilanterol, Metoprolol Tartrate, Mirabegron ER, albuterol sulfate HFA, allopurinol, amLODIPine, aspirin, atorvastatin, baclofen,  colchicine, doxazosin, empagliflozin, famotidine, finasteride, folic acid, furosemide, glucose blood, hydrALAZINE, ipratropium, losartan, metoprolol succinate XL, minoxidil, omeprazole, oxybutynin, oxybutynin XL, potassium chloride, tiotropium, and vitamin C    Allergies   Allergen Reactions   • Lisinopril Swelling   • Benazepril Swelling       Objective   Objective     Vital Signs:   Temp:  [99.7 °F (37.6 °C)] 99.7 °F (37.6 °C)  Heart Rate:  [76-87] 80  Resp:  [18-24] 24  BP: (130-155)/(60-76) 144/71  Flow (L/min):  [3] 3    Physical Exam  Constitutional:       General: He is not in acute distress.     Appearance: Normal appearance. He is obese.   HENT:      Head: Atraumatic.      Right Ear: External ear normal.      Left Ear: External ear normal.      Nose: Nose normal.   Eyes:      Extraocular Movements: Extraocular movements intact.      Conjunctiva/sclera: Conjunctivae normal.      Pupils: Pupils are equal, round, and reactive to light.   Cardiovascular:      Rate and Rhythm: Normal rate and regular rhythm.      Pulses: Normal pulses.      Heart sounds: Murmur heard.   Pulmonary:      Effort: No respiratory distress.      Comments: On 3L N[place] with O2 sat 96%. Patient in no obvious signs of resp distress. There is some faint rales heard throughout lung fields bilaterally.   Abdominal:      General: Bowel sounds are normal. There is distension.      Tenderness: There is no abdominal tenderness. There is no guarding or rebound.   Musculoskeletal:         General: Normal range of motion.      Cervical back: No rigidity.      Right lower leg: Edema (2+) present.      Left lower leg: Edema (2+) present.   Skin:     General: Skin is warm and dry.      Coloration: Skin is not jaundiced.      Findings: No lesion or rash.   Neurological:      General: No focal deficit present.      Mental Status: He is alert and oriented to person, place, and time.   Psychiatric:         Attention and Perception: Attention normal.          Mood and Affect: Mood normal.         Behavior: Behavior normal.         Thought Content: Thought content normal.         Result Review:  I have personally reviewed the results from the time of this admission to 3/5/2023 23:16 EST and agree with these findings:  [x]  Laboratory list / accordion  []  Microbiology  [x]  Radiology  [x]  EKG/Telemetry   []  Cardiology/Vascular   []  Pathology  [x]  Old records  []  Other:    LAB RESULTS:      Lab 03/05/23 1918 03/05/23 1917   WBC  --  3.85   HEMOGLOBIN  --  9.5*   HEMATOCRIT  --  29.4*   PLATELETS  --  124*   NEUTROS ABS  --  2.37   IMMATURE GRANS (ABS)  --  0.02   LYMPHS ABS  --  0.89   MONOS ABS  --  0.39   EOS ABS  --  0.14   MCV  --  93.0   PROCALCITONIN  --  0.07   LACTATE 0.9  --          Lab 03/05/23 1917   SODIUM 145   POTASSIUM 4.6   CHLORIDE 111*   CO2 27.0   ANION GAP 7.0   BUN 26*   CREATININE 1.49*   EGFR 47.7*   GLUCOSE 104*   CALCIUM 8.4*         Lab 03/05/23 1917   TOTAL PROTEIN 6.8   ALBUMIN 3.8   GLOBULIN 3.0   ALT (SGPT) 15   AST (SGOT) 19   BILIRUBIN 0.5   ALK PHOS 80         Lab 03/05/23 1917   PROBNP 2,179.0*   HSTROP T 53*                 Lab 03/05/23 2039   PH, ARTERIAL 7.315*   PCO2, ARTERIAL 53.2*   PO2 ART 74.8*   FIO2 32   HCO3 ART 27.1*   BASE EXCESS ART 0.3   CARBOXYHEMOGLOBIN 1.8     Brief Urine Lab Results     None        Microbiology Results (last 10 days)     Procedure Component Value - Date/Time    COVID PRE-OP / PRE-PROCEDURE SCREENING ORDER (NO ISOLATION) - Swab, Nasopharynx [[id number]]  (Normal) Collected: 03/05/23 1934    Lab Status: Final result Specimen: Swab from Nasopharynx Updated: 03/05/23 2002    Narrative:      The following orders were created for panel order COVID PRE-OP / PRE-PROCEDURE SCREENING ORDER (NO ISOLATION) - Swab, Nasopharynx.  Procedure                               Abnormality         Status                     ---------                               -----------         ------                      COVID-19 and FLU A/B PCR...[[id number]]  Normal              Final result                 Please view results for these tests on the individual orders.    COVID-19 and FLU A/B PCR - Swab, Nasopharynx [[id number]]  (Normal) Collected: 03/05/23 1934    Lab Status: Final result Specimen: Swab from Nasopharynx Updated: 03/05/23 2002     COVID19 Not Detected     Influenza A PCR Not Detected     Influenza B PCR Not Detected    Narrative:      Fact sheet for providers: https://www.fda.gov/media/111402/download    Fact sheet for patients: https://www.fda.gov/media/571592/download    Test performed by PCR.          CT Abdomen Pelvis With Contrast    Result Date: 3/5/2023  EXAMINATION:  CTA CHEST, CT ABDOMEN AND PELVIS WITH IV CONTRAST DATE OF EXAM: 3/5/2023 8:44 PM HISTORY: chest pain , abdominal pain and distention TECHNIQUE: CTA of the chest followed by routine CT abdomen and pelvis was performed following the intravenous administration of iodinated contrast. Sagittal, coronal and 3-D reformatted images were provided. CT dose lowering techniques were used, to include: automated exposure control, adjustment for patient size, and or use of iterative reconstruction. 3-D MIP images were performed under concurrent supervision not requiring an independent workstation, in order to better visualize the vasculature. COMPARISON: None FINDINGS: CHEST CTA: Lungs:  Moderate peripheral and lobular septal thickening. There is also minor patchy groundglass in the upper lungs. Bilateral dependent opacities right lung greater than left. There is some calcific densities at the lung bases, possibly prior barium aspiration. There are moderate emphysematous changes in the periphery of the right upper lobe. Pleura: Small bilateral pleural effusions. Mediastinum And Gillian: Top normal size mediastinal lymph nodes. Pulmonary Arteries: Suboptimally opacified. No filling defect. 3-D images corroborate 2-D findings. Cardiovascular: Small pericardial  effusion. No thoracic aortic aneurysm or dissection . Minor coronary artery atherosclerotic calcifications. Chest Wall:  Normal. ABDOMEN/PELVIS: Liver: There are a few small cysts. Other subcentimeter hypodensities too small to characterize. Gallbladder/Biliary: Gallbladder lumen is distended. There is suggestion of a focal area of gallbladder wall edema. No radiopaque gallstone. Pancreas: Normal. Spleen: Normal. Adrenal Glands: Normal. Kidneys: Normal. GI Tract: No bowel dilation. Mesentery/Peritoneum: Trace ascites. Vasculature: Minor atherosclerotic calcifications. Lymph Nodes: Bilateral enlarged inguinal lymph nodes which retains fatty edgar and reniform shape Abdominal Wall: Minor body wall edema. Bladder:  Decompressed Reproductive: Normal. MUSCULOSKELETAL: Degenerative changes in the spine     Impression: 1.  Moderate CHF/volume overload including small bilateral pleural effusions, small pericardial effusion, and pulmonary interstitial edema. In this setting the minor patchy groundglass pulmonary opacities likely represent alveolar edema. Recommend follow-up chest CT in six months to ensure resolution. 2.  Gallbladder luminal distention and suspected focal wall thickening. Recommend further evaluation with ultrasound or HIDA for acute cholecystitis. 3.  Enlarged bilateral inguinal lymph nodes, favored to be reactive given morphology. Correlate for lower extremity infection. 4.  No evidence of pulmonary embolus. 5.  Chronic findings as above. Electronically signed by:  Dario Voss M.D.  3/5/2023 7:36 PM Mountain Time    XR Chest 1 View    Result Date: 3/5/2023  XR CHEST 1 VW Date of Exam: 3/5/2023 7:06 PM EST Indication: SOA triage protocol. Comparison: CT chest 5/18/2020 Findings: Lungs are normally expanded. Severe cardiomegaly. Airspace opacity in lung bases and right middle lobe. No pneumothorax.     Impression: Impression: Multifocal airspace opacity right lung worse than left with cardiomegaly.  Multifocal pneumonia is favored over pulmonary edema. Electronically Signed: Francoise Hightower  3/5/2023 7:47 PM EST  Workstation ID: [id number]    CT Angiogram Chest Pulmonary Embolism    Result Date: 3/5/2023  EXAMINATION:  CTA CHEST, CT ABDOMEN AND PELVIS WITH IV CONTRAST DATE OF EXAM: 3/5/2023 8:44 PM HISTORY: chest pain , abdominal pain and distention TECHNIQUE: CTA of the chest followed by routine CT abdomen and pelvis was performed following the intravenous administration of iodinated contrast. Sagittal, coronal and 3-D reformatted images were provided. CT dose lowering techniques were used, to include: automated exposure control, adjustment for patient size, and or use of iterative reconstruction. 3-D MIP images were performed under concurrent supervision not requiring an independent workstation, in order to better visualize the vasculature. COMPARISON: None FINDINGS: CHEST CTA: Lungs:  Moderate peripheral and lobular septal thickening. There is also minor patchy groundglass in the upper lungs. Bilateral dependent opacities right lung greater than left. There is some calcific densities at the lung bases, possibly prior barium aspiration. There are moderate emphysematous changes in the periphery of the right upper lobe. Pleura: Small bilateral pleural effusions. Mediastinum And Gillian: Top normal size mediastinal lymph nodes. Pulmonary Arteries: Suboptimally opacified. No filling defect. 3-D images corroborate 2-D findings. Cardiovascular: Small pericardial effusion. No thoracic aortic aneurysm or dissection . Minor coronary artery atherosclerotic calcifications. Chest Wall:  Normal. ABDOMEN/PELVIS: Liver: There are a few small cysts. Other subcentimeter hypodensities too small to characterize. Gallbladder/Biliary: Gallbladder lumen is distended. There is suggestion of a focal area of gallbladder wall edema. No radiopaque gallstone. Pancreas: Normal. Spleen: Normal. Adrenal Glands: Normal. Kidneys: Normal. GI Tract: No  bowel dilation. Mesentery/Peritoneum: Trace ascites. Vasculature: Minor atherosclerotic calcifications. Lymph Nodes: Bilateral enlarged inguinal lymph nodes which retains fatty edgar and reniform shape Abdominal Wall: Minor body wall edema. Bladder:  Decompressed Reproductive: Normal. MUSCULOSKELETAL: Degenerative changes in the spine     Impression: 1.  Moderate CHF/volume overload including small bilateral pleural effusions, small pericardial effusion, and pulmonary interstitial edema. In this setting the minor patchy groundglass pulmonary opacities likely represent alveolar edema. Recommend follow-up chest CT in six months to ensure resolution. 2.  Gallbladder luminal distention and suspected focal wall thickening. Recommend further evaluation with ultrasound or HIDA for acute cholecystitis. 3.  Enlarged bilateral inguinal lymph nodes, favored to be reactive given morphology. Correlate for lower extremity infection. 4.  No evidence of pulmonary embolus. 5.  Chronic findings as above. Electronically signed by:  Dario Voss M.D.  3/5/2023 7:36 PM Mountain Time          Assessment & Plan   Assessment & Plan       Acute on chronic congestive heart failure, unspecified heart failure type (HCC)    CKD (chronic kidney disease) stage 3, GFR 30-59 ml/min (HCC)    Essential hypertension    Rheumatoid arthritis (HCC)    COPD (chronic obstructive pulmonary disease) (HCC)    Type 2 diabetes mellitus (HCC)    Anemia    Thrombocytopenia (HCC)    JONAH (obstructive sleep apnea)    Inguinal lymphadenopathy      Miguel Camejo is a 78 y.o. male with a history of CHF, Rheumatoid arthritis, HTN, HLD, T2DM, COPD, JONAH (CPAP compliant), chronic pancytopenia and GERD who presents to [facility] ED for complaint of worsening dyspnea, as well as   worsening bilateral lower extremity edema.      Acute on chronic Congestive Heart Failure  Acute Resp Failure with Hypoxia  -CXR tonight shows multifocal airspace opacity of lungs somewhat  concerning for pneumonia.  -CTA chest today negative for PE. Does show CHF/volume overload with small pleural effusions, small pericardial effusion, and pulm edema. As well as minor patchy groundglass pulm opacities likely alveolar edema.   -Initial received Vanc + Zosyn in the ED for initial conern for pneumonia. Will back off abx.   -Last echo in file from [facility] on 12/7/22 - Normal sized left ventricle. Moderate left ventricular hypertrophy. Visually estimated ejection fraction 65% +/- 5%. Normal left ventricular systolic function. Indeterminate diastolic function.  -Repeat Echo in the am  -proBNP 2,179 here. Baseline unknown  -Received 2mg Bumex in the ED. Caution with further diuresing d/t poor renal function.   -Cardiology consult for the am  -Daily weight. Strict I&Os    Bilateral Inguinal Lymphadenopathy  -Given his complaint of mild abdominal pain and distention, a CT abd/pelvis was obtained which showed some gallbladder distention. RUQ US pending  -CT also showed enlarged bilateral inguinal lymph nodes.   -Given this finding in the absence of any obvious signs of local infectious process, as well as a note of a persistent low grade fever over the last several weeks, there is somewhat a concern for Lymphoma.   -Will ask Hem/Onc to see in the am (he is well known to Dr. Herrera).    Elevated Troponin  -Initial HS Trop elevated at 53  -Possibly chronically elevated 2ry to CKD.   -EKG shows NSR with some non-specific T wave changes.   -Will continue to trend troponin    COPD  JONAH  -Duo Nebs q4  -CPAP with home settings.     HTN  HLD  - Current med list in chart shows Norvasc, Carduria, Lasix, Hydralazine, Losartan, Metoprolol. Nursing staff to confirm home med list provided by patient. Will reorder once accurate med list confirmed.   -Continue statin    CKD  -Creatinine 1.49. This is around baseline for him.  -eGFR 47.7  -Caution with use of diuresis.   -Repeat bmp in the am    T2DM  -Blood glucose  104  -Check A1C  -Fingerstick achs. SSI  -Repeat bmp in the am    Anemia (chronic)  Thrombocytopenia  -Hgb 9.5, Hct 29.4  -platelets 124  -These are decreased from results on 5/2/22  -Followed by Dr. Herrera for chronic Pancytopenia. 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 He has put in a referral to Dr. Perez.        DVT prophylaxis:  SCDS    CODE STATUS:  Full Code  Code Status (Patient has no pulse and is not breathing): CPR (Attempt to Resuscitate)  Medical Interventions (Patient has pulse or is breathing): Full Support      Expected Discharge  TBD      This note has been completed as part of a split-shared workflow.     Signature: Electronically signed by Nayan Brandon PA-C, 03/05/23, 9:59 PM EST      Total APC time spent: 70 minutes      Attending   Admission Attestation       I have performed an independent face-to-face diagnostic evaluation including performing an independent physical examination as documented here.  The documented plan of care above was reviewed and developed with the advanced practice clinician (APC).      Brief Summary Statement:   Miguel Camejo is a 78 y.o. male with past medical history of COPD, type 2 diabetes, obstructive sleep apnea, chronic pancytopenia, GERD, rheumatoid arthritis, CHF, essential hypertension, hyperlipidemia who presents to the ER with complaints of increased shortness of breath.    Patient does have history of COPD and has been using his inhalers for shortness of breath over the past several days without any improvement in his symptoms.  Reports increased fullness to his abdomen and swelling to his lower extremities.  He takes his Lasix intermittently throughout the week.  He recently was undergoing work-up for pancytopenia by Dr. Herrera and was thought to possibly have cirrhosis.  Patient reports that he has had low-grade temperatures of around 100 over the past several weeks.   Does not feel ill and is not having any flulike symptoms.  Patient noted to have bilateral inguinal lymphadenopathy with no recent infection to the lower extremities.  Patient noted to have interstitial infiltrates consistent with pulmonary edema.  Admitted for presumed CHF exacerbation  Remainder of detailed HPI is as noted by APC and has been reviewed and/or edited by me for completeness.    Attending Physical Exam:  Temp:  [99.3 °F (37.4 °C)-99.7 °F (37.6 °C)] 99.3 °F (37.4 °C)  Heart Rate:  [73-87] 73  Resp:  [18-24] 22  BP: (130-161)/(60-76) 161/68  Flow (L/min):  [3] 3    Constitutional: Awake, alert  Eyes: PERRLA, sclerae anicteric, no conjunctival injection  HENT: NCAT, mucous membranes moist  Neck: Supple, no thyromegaly, no lymphadenopathy, trachea midline  Respiratory: crackles bilaterally,mildly labored respirations   Cardiovascular: RRR, no murmurs, rubs, or gallops, palpable pedal pulses bilaterally  Gastrointestinal: Positive bowel sounds, soft, nontender, nondistended,  Musculoskeletal: No bilateral ankle edema, no clubbing or cyanosis to extremities  Psychiatric: Appropriate affect, cooperative  Neurologic: Oriented x 3, strength symmetric in all extremities, Cranial Nerves grossly intact to confrontation, speech clear  Skin: No rashes      Brief Assessment/Plan :  See detailed assessment and plan developed with APC which I have reviewed and/or edited for completeness.            Tushar Tamayo,   03/06/23

## 2023-03-06 NOTE — PLAN OF CARE
Goal Outcome Evaluation:               VSS. Pt ABD bloated after eating- MD aware. KUB ordered. IV bumex given. Surgery consulted- no need for excisional biopsy at this time. Possible IR biopsy to be completed.

## 2023-03-06 NOTE — CASE MANAGEMENT/SOCIAL WORK
Discharge Planning Assessment  [facility]     Patient Name: Miguel Camejo  MRN: [id number]  Today's Date: 3/6/2023    Admit Date: 3/5/2023    Plan: Home   Discharge Needs Assessment     Row Name 03/06/23 1323       Living Environment    People in Home spouse    Name(s) of People in Home Ann Camejo    Current Living Arrangements home    Primary Care Provided by self    Provides Primary Care For no one    Family Caregiver if Needed spouse    Family Caregiver Names Ann Camejo    Quality of Family Relationships helpful;involved;supportive    Able to Return to Prior Arrangements yes       Resource/Environmental Concerns    Resource/Environmental Concerns none       Transition Planning    Patient/Family Anticipates Transition to home    Patient/Family Anticipated Services at Transition none    Transportation Anticipated family or friend will provide       Discharge Needs Assessment    Readmission Within the Last 30 Days no previous admission in last 30 days    Equipment Currently Used at Home cpap;oxygen;glucometer;bp cuff;cane, straight    Concerns to be Addressed denies needs/concerns at this time;discharge planning    Anticipated Changes Related to Illness none    Equipment Needed After Discharge none    Discharge Coordination/Progress Home               Discharge Plan     Row Name 03/06/23 1320       Plan    Plan Home    Patient/Family in Agreement with Plan yes    Plan Comments Per MDR, patient currently on 3LO2NC but only on 2L at baseline.  Spoke with patient and spouse at the bedside for initial encounter for discharge planning. Patient denies the use of HH but has used [facility] after knee surgery.  He has a Straight Cane to use if he goes long distances as well as a Glucometer and Blood Pressure Machine and a CPAP and continuous O2NC at 2L provided through [place].  He reports that he has prescription coverage and his medications are affordable.  Patient indicates that he lives in a split level house  "but enters in to the main floor from entry and only has two steps outside, he goes in the basement if he wants to go to the \"man cave\".  He received the COVID vaccine.  No immediate discharge needs verbalized.  CM following.  Patient plan is to discharge home via car with family to transport.    Final Discharge Disposition Code 01 - home or self-care              Continued Care and Services - Admitted Since 3/5/2023    Coordination has not been started for this encounter.       Expected Discharge Date and Time     Expected Discharge Date Expected Discharge Time    Mar 9, 2023          Demographic Summary     Row Name 03/06/23 1321       General Information    Admission Type inpatient    Arrived From home    Referral Source admission list    Reason for Consult discharge planning    Preferred Language English    General Information Comments Cuong Hairston MD       Contact Information    Permission Granted to Share Info With     Contact Information Obtained for     Contact Information Comments Ann Camejo, spouse  [phone]  Miguel Camejo Jr., son  [phone]               Functional Status     Row Name 03/06/23 1322       Functional Status    Usual Activity Tolerance good    Current Activity Tolerance good       Functional Status, IADL    Medications independent    Meal Preparation assistive equipment    Housekeeping assistive equipment    Laundry assistive equipment    Shopping assistive equipment       Employment/    Employment/ Comments [employer] Replacement               Psychosocial    No documentation.                Abuse/Neglect    No documentation.                Legal    No documentation.                Substance Abuse    No documentation.                Patient Forms    No documentation.                   Yamilet Hou RN    "

## 2023-03-06 NOTE — THERAPY DISCHARGE NOTE
Acute Care - Occupational Therapy Discharge  [facility]    Patient Name: Miguel Camejo  : 1945    MRN: [id number]                              Today's Date: 3/6/2023       Admit Date: 3/5/2023    Visit Dx:     ICD-10-CM ICD-9-CM   1. Acute on chronic congestive heart failure, unspecified heart failure type (Former[facility])  I50.9 428.0   2. Acute on chronic respiratory failure with hypoxia (Formerly [facility])  J96.21 518.84     799.02   3. Peripheral edema  R60.9 782.3   4. Mild renal insufficiency  N28.9 593.9     Patient Active Problem List   Diagnosis   • Anemia   • Acute on chronic congestive heart failure, unspecified heart failure type ([facility]ly [facility])   • Thrombocytopenia ([facility]ly [facility])   • CKD (chronic kidney disease) stage 3, GFR 30-59 ml/min ([facility]ly [facility])   • Essential hypertension   • JONAH (obstructive sleep apnea)   • Rheumatoid arthritis ([facility]ly [facility])   • COPD (chronic obstructive pulmonary disease) ([facility]ly [facility])   • Type 2 diabetes mellitus ([facility])   • Inguinal lymphadenopathy     Past Medical History:   Diagnosis Date   • Arthritis    • Asthma    • Bowel trouble    • CHF (congestive heart failure) ([facility]ly [facility])    • Chondrocalcinosis of right knee    • Colitis    • COPD (chronic obstructive pulmonary disease) ([facility]ly [facility])    • Diabetes mellitus ([facility])    • Esophagitis    • Gout    • H/O bladder problems    • Heart murmur    • Hypertension    • IBS (irritable bowel syndrome)    • JONAH on CPAP    • Primary osteoarthritis of both knees    • Rheumatoid arthritis ([facility])    • Skin problem    • SOB (shortness of breath)      Past Surgical History:   Procedure Laterality Date   • COLONOSCOPY     • KNEE ARTHROSCOPY Left    • PARTIAL KNEE ARTHROPLASTY Left    • SKIN BIOPSY     • STOMACH SURGERY     • UPPER GASTROINTESTINAL ENDOSCOPY     • VASECTOMY        General Information     Row Name 23 1351          OT Time and Intention    Document Type discharge evaluation/summary  -     Mode of Treatment occupational therapy  -     Row Name 23 1359          General Information     Patient Profile Reviewed yes  -     Prior Level of Function independent:;all household mobility;community mobility;gait;transfer;bed mobility;ADL's  -     Existing Precautions/Restrictions fall;oxygen therapy device and L/min  -     Barriers to Rehab medically complex  -     Row Name 03/06/23 1351          Living Environment    People in Home spouse  -     Row Name 03/06/23 1351          Home Main Entrance    Number of Stairs, Main Entrance three  -HM     Stair Railings, Main Entrance railings on both sides of stairs  -     Row Name 03/06/23 1351          Stairs Within Home, Primary    Number of Stairs, Within Home, Primary none  -HM     Stair Railings, Within Home, Primary none  -HM     Stairs Comment, Within Home, Primary Pt does have stairs in home however bedroom and bathroom are on first floor.  -     Row Name 03/06/23 1351          Cognition    Orientation Status (Cognition) oriented x 4  -     Row Name 03/06/23 1351          Safety Issues, Functional Mobility    Safety Issues Affecting Function (Mobility) safety precaution awareness  -     Impairments Affecting Function (Mobility) endurance/activity tolerance  -           User Key  (r) = Recorded By, (t) = Taken By, (c) = Cosigned By    Initials Name Provider Type     Elmira Matthews OT Occupational Therapist               Mobility/ADL's     Row Name 03/06/23 1353          Bed Mobility    Bed Mobility scooting/bridging;supine-sit  -     Scooting/Bridging [place] (Bed Mobility) modified independence  -     Supine-Sit [place] (Bed Mobility) modified independence  -     Assistive Device (Bed Mobility) head of bed elevated  -     Row Name 03/06/23 1353          Transfers    Transfers sit-stand transfer  -     Row Name 03/06/23 1353          Sit-Stand Transfer    Sit-Stand [place] (Transfers) supervision  -     Assistive Device (Sit-Stand Transfers) other (see comments)  none  -     Row Name 03/06/23 1353           Functional Mobility    Functional Mobility- Ind. Level supervision required  -     Functional Mobility- Device other (see comments)  none  -     Row Name 03/06/23 1353          Activities of Daily Living    BADL Assessment/Intervention lower body dressing;toileting  -     Row Name 03/06/23 1353          Lower Body Dressing Assessment/Training    [place] Level (Lower Body Dressing) doff;don;socks;independent  -     Position (Lower Body Dressing) edge of bed sitting  -     Row Name 03/06/23 1353          Toileting Assessment/Training    [place] Level (Toileting) adjust/manage clothing;other (see comments);independent  Use of urinal  -     Assistive Devices (Toileting) urinal  -     Position (Toileting) unsupported standing  -           User Key  (r) = Recorded By, (t) = Taken By, (c) = Cosigned By    Initials Name Provider Type     Elmira Matthews OT Occupational Therapist               Obj/Interventions     Row Name 03/06/23 1355          Sensory Assessment (Somatosensory)    Sensory Assessment (Somatosensory) sensation intact  -     Row Name 03/06/23 1355          Vision Assessment/Intervention    Visual Impairment/Limitations WFL  -     Row Name 03/06/23 1355          Range of Motion Comprehensive    General Range of Motion no range of motion deficits identified  -     Comment, General Range of Motion BUE WNL  -     Row Name 03/06/23 1355          Strength Comprehensive (MMT)    General Manual Muscle Testing (MMT) Assessment no strength deficits identified  -     Comment, General Manual Muscle Testing (MMT) Assessment B UE 5/5  -     Row Name 03/06/23 1355          Motor Skills    Motor Skills functional endurance;coordination  -     Coordination WNL  -     Functional Endurance decreased  -     Row Name 03/06/23 1355          Balance    Balance Assessment sitting static balance;sitting dynamic balance;standing static balance;standing dynamic balance  -      Static Sitting Balance independent  -     Dynamic Sitting Balance independent  -     Position, Sitting Balance unsupported;sitting edge of bed  -     Static Standing Balance supervision  -     Dynamic Standing Balance supervision  -     Position/Device Used, Standing Balance unsupported  -     Balance Interventions sitting;standing;occupation based/functional task  -           User Key  (r) = Recorded By, (t) = Taken By, (c) = Cosigned By    Initials Name Provider Type     Elmira Matthews, OT Occupational Therapist               Goals/Plan    No documentation.                Clinical Impression     Row Name 03/06/23 3498          Pain Assessment    Pretreatment Pain Rating 0/10 - no pain  -     Posttreatment Pain Rating 0/10 - no pain  -     Row Name 03/06/23 135          Plan of Care Review    Plan of Care Reviewed With patient;spouse  -     Progress no change  -     Outcome Evaluation OT eval complete. Pt presents at baseline function with all transfers, functional mobility, and ADLS. No deficits that require skilled OT services. Recommend d/c home with family.  -     Row Name 03/06/23 3822          Therapy Assessment/Plan (OT)    Patient/Family Therapy Goal Statement (OT) Pt would like to improve and return home.  -     Rehab Potential (OT) good, to achieve stated therapy goals  -     Criteria for Skilled Therapeutic Interventions Met (OT) yes;skilled treatment is necessary  -     Therapy Frequency (OT) daily  -     Row Name 03/06/23 0591          Therapy Plan Review/Discharge Plan (OT)    Anticipated Discharge Disposition (OT) home with assist  -     Row Name 03/06/23 9517          Vital Signs    Pre Systolic BP Rehab 135  -HM     Pre Treatment Diastolic BP 79  -HM     Post Systolic BP Rehab 153  -HM     Post Treatment Diastolic BP 65  -HM     O2 Delivery Pre Treatment supplemental O2  -HM     O2 Delivery Intra Treatment supplemental O2  -HM     O2 Delivery Post  Treatment supplemental O2  -HM     Pre Patient Position Supine  -HM     Intra Patient Position Standing  -HM     Post Patient Position Sitting  -HM     Row Name 03/06/23 1359          Positioning and Restraints    Pre-Treatment Position in bed  -HM     Post Treatment Position chair  -HM     In Chair notified nsg;reclined;call light within reach;encouraged to call for assist;with family/caregiver  No exit alarm box in room. RN aware.  -HM           User Key  (r) = Recorded By, (t) = Taken By, (c) = Cosigned By    Initials Name Provider Type     Elmira Matthews, OT Occupational Therapist               Outcome Measures     Row Name 03/06/23 1408          How much help from another is currently needed...    Putting on and taking off regular lower body clothing? 4  -HM     Bathing (including washing, rinsing, and drying) 4  -HM     Toileting (which includes using toilet bed pan or urinal) 4  -HM     Putting on and taking off regular upper body clothing 4  -HM     Taking care of personal grooming (such as brushing teeth) 4  -HM     Eating meals 4  -     AM-PAC 6 Clicks Score (OT) 24  -HM     Row Name 03/06/23 1357          How much help from another person do you currently need...    Turning from your back to your side while in flat bed without using bedrails? 4  -ES     Moving from lying on back to sitting on the side of a flat bed without bedrails? 4  -ES     Moving to and from a bed to a chair (including a wheelchair)? 4  -ES     Standing up from a chair using your arms (e.g., wheelchair, bedside chair)? 4  -ES     Climbing 3-5 steps with a railing? 3  -ES     To walk in hospital room? 4  -ES     AM-PAC 6 Clicks Score (PT) 23  -ES     Highest level of mobility 7 --> Walked 25 feet or more  -ES     Row Name 03/06/23 1408 03/06/23 1357       Functional Assessment    Outcome Measure Options AM-PAC 6 Clicks Daily Activity (OT)  - AM-PAC 6 Clicks Basic Mobility (PT)  -ES          User Key  (r) = Recorded By, (t) =  Taken By, (c) = Cosigned By    Initials Name Provider Type     Elmira Matthews OT Occupational Therapist    Cinda Barnes PT Physical Therapist              Occupational Therapy Education     Title: PT OT SLP Therapies (In Progress)     Topic: Occupational Therapy (In Progress)     Point: ADL training (Done)     Description:   Instruct learner(s) on proper safety adaptation and remediation techniques during self care or transfers.   Instruct in proper use of assistive devices.              Learning Progress Summary           Patient Acceptance, E,D,TB, VU by  at 3/6/2023 1409                   Point: Home exercise program (Not Started)     Description:   Instruct learner(s) on appropriate technique for monitoring, assisting and/or progressing therapeutic exercises/activities.              Learner Progress:  Not documented in this visit.          Point: Precautions (Done)     Description:   Instruct learner(s) on prescribed precautions during self-care and functional transfers.              Learning Progress Summary           Patient Acceptance, E,D,TB, VU by  at 3/6/2023 1409                   Point: Body mechanics (Done)     Description:   Instruct learner(s) on proper positioning and spine alignment during self-care, functional mobility activities and/or exercises.              Learning Progress Summary           Patient Acceptance, E,D,TB, VU by  at 3/6/2023 1409                               User Key     Initials Effective Dates Name Provider Type Discipline     10/25/22 -  Elmira Matthews OT Occupational Therapist OT              OT Recommendation and Plan  Therapy Frequency (OT): daily  Plan of Care Review  Plan of Care Reviewed With: patient, spouse  Progress: no change  Outcome Evaluation: OT eval complete. Pt presents at baseline function with all transfers, functional mobility, and ADLS. No deficits that require skilled OT services. Recommend d/c home with family.  Plan of Care  Reviewed With: patient, spouse  Outcome Evaluation: OT eval complete. Pt presents at baseline function with all transfers, functional mobility, and ADLS. No deficits that require skilled OT services. Recommend d/c home with family.     Time Calculation:    Time Calculation- OT     Row Name 03/06/23 1318             Time Calculation- OT    OT Start Time 1318  -HM      OT Received On 03/06/23  -HM      OT Goal Re-Cert Due Date 03/16/23  -HM         Untimed Charges    OT Eval/Re-eval Minutes 51  -HM         Total Minutes    Untimed Charges Total Minutes 51  -HM       Total Minutes 51  -HM            User Key  (r) = Recorded By, (t) = Taken By, (c) = Cosigned By    Initials Name Provider Type     Elmira Matthews OT Occupational Therapist              Therapy Charges for Today     Code Description Service Date Service Provider Modifiers Qty    [id number] HC OT EVAL LOW COMPLEXITY 4 3/6/2023 Elmira Matthews OT GO 1             OT Discharge Summary  Anticipated Discharge Disposition (OT): home with assist    Elmira Matthews OT  3/6/2023

## 2023-03-06 NOTE — CONSULTS
[facility] Cardiology  Initial Consult Note      Patient Identification:  Miguel Camejo        78 y.o.        male  1945  [id number]       Date of Consultation:  03/06/23    Reason for Consultation:  CHF    PCP: Cuong Hairston MD  Primary cardiologist: Shivam Lyon MD ([place])  Referring physician: Paul Castro MD    History of Present Illness:     Miguel Camejo is a 78 y.o. with a history of COPD on 2 L home oxygen, JONAH on CPAP, hypertension, pericardial effusion described as idiopathic, history of venous hypertension and lymphedema with compression stockings, HFpEF who presented to the ED last evening with shortness of breath and cough and found to have imaging consistent with pulmonary edema.  Reports that he had also been noticing having more dyspnea with activity such as bending down to tie his shoes.  He does think he has had about a 10 pound weight gain since his last visit with Dr. Lyon on February 7. At his last office visit his antihypertensive medications were adjusted he was started on empagliflozin.     Past History:  Past Medical History:   Diagnosis Date   • Arthritis    • Asthma    • Bowel trouble    • CHF (congestive heart failure) ([facility])    • Chondrocalcinosis of right knee    • Colitis    • COPD (chronic obstructive pulmonary disease) ([facility])    • Diabetes mellitus ([facility])    • Esophagitis    • Gout    • H/O bladder problems    • Heart murmur    • Hypertension    • IBS (irritable bowel syndrome)    • JONAH on CPAP    • Primary osteoarthritis of both knees    • Rheumatoid arthritis (HCC)    • Skin problem    • SOB (shortness of breath)      Past Surgical History:   Procedure Laterality Date   • COLONOSCOPY     • KNEE ARTHROSCOPY Left    • PARTIAL KNEE ARTHROPLASTY Left    • SKIN BIOPSY     • STOMACH SURGERY     • UPPER GASTROINTESTINAL ENDOSCOPY     • VASECTOMY       Allergies   Allergen Reactions   • Lisinopril Swelling   • Benazepril Swelling      Social History     Socioeconomic History   • Marital status:    Tobacco Use   • Smoking status: Former     Packs/day: 2.00     Years: 36.00     Pack years: 72.00     Types: Cigarettes   • Smokeless tobacco: Never   Vaping Use   • Vaping Use: Never used   Substance and Sexual Activity   • Alcohol use: Yes     Comment: occasional   • Drug use: No   • Sexual activity: Defer     Family History   Problem Relation Age of Onset   • Hypertension Mother    • Cancer Mother    • Rheum arthritis Mother    • Osteoarthritis Mother    • Hypertension Father    • Heart attack Father    • Heart disease Father    • Hypertension Other    • Heart attack Other    • Heart disease Other    • Cancer Other    • Stroke Other    • BRCA 1/2 Neg Hx    • Breast cancer Neg Hx    • Ovarian cancer Neg Hx      Medications:  Medications Prior to Admission   Medication Sig Dispense Refill Last Dose   • allopurinol (ZYLOPRIM) 300 MG tablet Take  by mouth Take As Directed.   Past Week   • amLODIPine (NORVASC) 10 MG tablet    3/5/2023   • aspirin 81 MG EC tablet Take 1 tablet by mouth Daily.   3/6/2023   • atorvastatin (LIPITOR) 40 MG tablet Take  by mouth Take As Directed.   3/5/2023   • Baclofen 5 MG tablet Take 5 mg by mouth 3 (Three) Times a Day.   3/6/2023   • Breo Ellipta 100-25 MCG/INH inhaler    3/6/2023   • cetirizine (zyrTEC) 10 MG tablet Take 1 tablet by mouth Daily.   3/5/2023   • doxazosin (CARDURA) 4 MG tablet Take 2 tablets by mouth Every Night.   3/5/2023   • famotidine (PEPCID) 20 MG tablet Take 1 tablet by mouth 2 (Two) Times a Day.   Past Week   • ferrous sulfate 325 (65 Fe) MG tablet Take 1 tablet by mouth Daily With Breakfast. OTC   3/5/2023   • finasteride (PROSCAR) 5 MG tablet Take 1 tablet by mouth Daily.   3/5/2023   • furosemide (LASIX) 40 MG tablet Take 1 tablet by mouth Take As Directed. 1 - 2 tablets a week   3/5/2023   • hydrALAZINE (APRESOLINE) 25 MG tablet Take 2 tablets by mouth 2 (Two) Times a Day.   3/6/2023    • Jardiance 10 MG tablet tablet Take 1 tablet by mouth Daily.   3/6/2023   • losartan (COZAAR) 100 MG tablet Take 1 tablet by mouth Daily.   3/6/2023   • metoprolol succinate XL (TOPROL-XL) 100 MG 24 hr tablet Take 1 tablet by mouth 2 (Two) Times a Day.   3/6/2023   • minoxidil (LONITEN) 10 MG tablet Take 1 tablet by mouth 4 (Four) Times a Day.   3/6/2023   • omeprazole (priLOSEC) 40 MG capsule Take 1 capsule by mouth Daily With Breakfast, Lunch & Dinner.   3/5/2023   • oxybutynin (DITROPAN) 5 MG tablet Take 1 tablet by mouth 2 (Two) Times a Day.   Past Week   • potassium chloride (K-DUR,KLOR-CON) 20 MEQ CR tablet Take 1 tablet by mouth Daily.   3/6/2023   • tiotropium (SPIRIVA) 18 MCG per inhalation capsule Place 1 capsule into inhaler and inhale Every Morning.   3/5/2023   • vitamin C (ASCORBIC ACID) 250 MG tablet Take 1 tablet by mouth 2 (Two) Times a Day. OTC   3/5/2023   • albuterol sulfate  (90 Base) MCG/ACT inhaler Inhale Take As Directed.   Unknown   • COLCRYS 0.6 MG tablet Take 1 tablet by mouth As Needed.   Unknown   • folic acid (FOLVITE) 1 MG tablet Take 400 tablets by mouth Daily.   Unknown   • methylcellulose, Laxative, (CITRUCEL) 500 MG tablet tablet Take 1 tablet by mouth 2 (Two) Times a Day.   Unknown   • Probiotic Product (VISBIOME HIGH POTENCY PO) Take 112.5 mg by mouth 2 (Two) Times a Day. OTC   Unknown     Current medications:  insulin lispro, 0-9 Units, Subcutaneous, TID AC  ipratropium-albuterol, 3 mL, Nebulization, 4x Daily - RT  pharmacy consult - MTM, , Does not apply, BID  sodium chloride, 10 mL, Intravenous, Q12H      Current IV drips:       Review of Systems   Constitutional: Negative for fever and malaise/fatigue.   Cardiovascular: Positive for dyspnea on exertion and leg swelling. Negative for chest pain, near-syncope, orthopnea, palpitations and syncope.   Respiratory: Positive for shortness of breath and sputum production.      Physical exam:  Temp:  [98 °F (36.7 °C)-99.7 °F  (37.6 °C)] 98 °F (36.7 °C)  Heart Rate:  [63-87] 77  Resp:  [18-24] 20  BP: (130-161)/(60-79) 135/79  Flow (L/min):  [3] 3  Body mass index is 36.65 kg/m².    Gen: well developed, sitting up in chair  HEENT: MMM, sclerae anicteric, conjunctivae normal  CV: regular rate, regular rhythm, 2 out of 6 systolic ejection murmur, normal S1, S2. 2+ radial and DP pulses  Pulm: Nasal cannula oxygen, normal work of breathing, faint basilar rales  Abd: soft, nontender, nondistended, +bowel sounds  Ext: normal bulk for age, normal tone, 1+ bilateral lower extremity edema  Neuro: alert, oriented, face symmetrical, moving all extremities well  Psych: normal mood, appropriate affect    Cardiac Testing:    ECG  (personally reviewed) normal sinus rhythm, no acute ischemic changes    Telemetry:  (personally reviewed) sinus rhythm, sinus bradycardia, no sustained arrhythmia seen    ECHO:     3/6/23 - Transthoracic Echo Complete  •  Left ventricular systolic function is hyperdynamic (EF > 70%). Calculated left ventricular EF = 74%  •  Left ventricular diastolic function is consistent with (grade I) impaired relaxation.  •  There is a small (<1cm) pericardial effusion.    12/7/22 - TTE   Normal sized left ventricle. Moderate left ventricular hypertrophy.    Visually estimated ejection fraction 65% +/- 5%. Normal left ventricular    systolic function. Indeterminate diastolic function.    No significant valvular heart disease.    Moderate pericardial effusion 1.3 cm. No tamponade.     Lab Review:    Lab Results   Component Value Date    WBC 3.04 (L) 03/06/2023    HGB 9.6 (L) 03/06/2023    HCT 29.8 (L) 03/06/2023    MCV 92.3 03/06/2023     03/06/2023     Lab Results   Component Value Date    GLUCOSE 121 (H) 03/06/2023    BUN 32 (H) 03/06/2023    CREATININE 1.69 (H) 03/06/2023    EGFRIFAFRI 65 11/16/2021    BCR 18.9 03/06/2023    K 5.1 03/06/2023    CO2 25.0 03/06/2023    CALCIUM 8.9 03/06/2023    PROTENTOTREF 6.4 05/02/2022    ALBUMIN  3.8 03/05/2023    LABIL2 1.1 05/02/2022    AST 19 03/05/2023    ALT 15 03/05/2023     Lab Results   Component Value Date    TROPONINT 48 (H) 03/06/2023    TROPONINT 49 (H) 03/06/2023    TROPONINT 53 (C) 03/05/2023     Lab Results   Component Value Date    PROBNP 2,179.0 (H) 03/05/2023     Lab Results   Component Value Date    HGBA1C 5.10 03/06/2023      Lab Results   Component Value Date    CHOL 130 03/06/2023    CHLPL 134 12/08/2014    TRIG 59 03/06/2023    HDL 37 (L) 03/06/2023    LDL 80 03/06/2023     Imaging:  All pertinent imaging studies were personally reviewed.    Assessment & Plan    Acute on chronic congestive heart failure, unspecified heart failure type ([facility])    Anemia    Thrombocytopenia (HCC)    CKD (chronic kidney disease) stage 3, GFR 30-59 ml/min ([facility])    Essential hypertension    JONAH (obstructive sleep apnea)    Rheumatoid arthritis ([facility])    COPD (chronic obstructive pulmonary disease) ([facility])    Type 2 diabetes mellitus ([facility])    Inguinal lymphadenopathy    Acute on chronic heart failure with preserved ejection fraction   - We will redosed with IV bumetanide this afternoon   - Follow BMP, wean oxygen as able   - Continue empagliflozin    Chronic pericardial effusion, idiopathic   - Small on echocardiogram today    Troponin elevation   - No symptoms of ACS and ECG with no ischemic changes, suspicious for demand ischemia in the setting of heart failure as well as chronic kidney disease   - Continue home aspirin and beta-blocker, statin    Hypertension   - Home meds reordered, will hold ARB pending renal function tomorrow    Thank you for allowing us to share in the care of Miguel Wells MD  03/06/23   13:51 EST

## 2023-03-06 NOTE — PROGRESS NOTES
[facility]  PROGRESS NOTE    Patient Name: Miguel Camejo  : 1945  MRN: [id number]    Date of Admission: 3/5/2023  Primary Care Physician: Cuong Hairston MD    Subjective   Subjective     CC:   Presented to the ER on  with shortness of breath    HPI:   Brought in by EMS for shortness of breath that has been worsening over several days.  Wife in room today.   Reports of abdominal distention and fullness after eating    ROS:    Gen- No fevers, chills  CV- No chest pain, palpitations  Resp- No cough, dyspnea  GI- No N/V/D, abd pain    Objective   Objective     Vital Signs:   Temp:  [98 °F (36.7 °C)-99.3 °F (37.4 °C)] 98.8 °F (37.1 °C)  Heart Rate:  [63-80] 74  Resp:  [18-24] 18  BP: (133-161)/(62-79) 133/64  Flow (L/min):  [2-3] 2     Physical Exam:    Constitutional: No acute distress, awake, alert  HENT: NCAT, mucous membranes moist  Respiratory: Clear to auscultation bilaterally, respiratory effort normal   Cardiovascular: RRR, S1-S2  Gastrointestinal: Positive bowel sounds, soft, nontender, distended appearing abdomen  Musculoskeletal: No bilateral ankle edema  Psychiatric: Appropriate affect, cooperative  Skin: No rashes    Results Reviewed:  LAB RESULTS:      Lab 23   WBC 3.04*  --  3.85   HEMOGLOBIN 9.6*  --  9.5*   HEMATOCRIT 29.8*  --  29.4*   PLATELETS 146  --  124*   NEUTROS ABS 2.38  --  2.37   IMMATURE GRANS (ABS) 0.01  --  0.02   LYMPHS ABS 0.52*  --  0.89   MONOS ABS 0.11  --  0.39   EOS ABS 0.00  --  0.14   MCV 92.3  --  93.0   PROCALCITONIN  --   --  0.07   LACTATE  --  0.9  --          Lab 23  0723   SODIUM 143 145   POTASSIUM 5.1 4.6   CHLORIDE 107 111*   CO2 25.0 27.0   ANION GAP 11.0 7.0   BUN 32* 26*   CREATININE 1.69* 1.49*   EGFR 41.0* 47.7*   GLUCOSE 121* 104*   CALCIUM 8.9 8.4*   HEMOGLOBIN A1C 5.10  --          Lab 23   TOTAL PROTEIN 6.8   ALBUMIN 3.8    GLOBULIN 3.0   ALT (SGPT) 15   AST (SGOT) 19   BILIRUBIN 0.5   ALK PHOS 80         Lab 03/06/23  0727 03/06/23  0021 03/05/23 1917   PROBNP  --   --  2,179.0*   HSTROP T 48* 49* 53*         Lab 03/06/23 0727   CHOLESTEROL 130   LDL CHOL 80   HDL CHOL 37*   TRIGLYCERIDES 59             Lab 03/05/23 2039   PH, ARTERIAL 7.315*   PCO2, ARTERIAL 53.2*   PO2 ART 74.8*   FIO2 32   HCO3 ART 27.1*   BASE EXCESS ART 0.3   CARBOXYHEMOGLOBIN 1.8     Brief Urine Lab Results     None          Microbiology Results Abnormal     Procedure Component Value - Date/Time    Blood Culture - Blood, Arm, Left [[id number]]  (Normal) Collected: 03/05/23 1942    Lab Status: Preliminary result Specimen: Blood from Arm, Left Updated: 03/06/23 2000     Blood Culture No growth at 24 hours    Blood Culture - Blood, Arm, Right [[id number]]  (Normal) Collected: 03/05/23 1933    Lab Status: Preliminary result Specimen: Blood from Arm, Right Updated: 03/06/23 2000     Blood Culture No growth at 24 hours    COVID PRE-OP / PRE-PROCEDURE SCREENING ORDER (NO ISOLATION) - Swab, Nasopharynx [[id number]]  (Normal) Collected: 03/05/23 1934    Lab Status: Final result Specimen: Swab from Nasopharynx Updated: 03/05/23 2002    Narrative:      The following orders were created for panel order COVID PRE-OP / PRE-PROCEDURE SCREENING ORDER (NO ISOLATION) - Swab, Nasopharynx.  Procedure                               Abnormality         Status                     ---------                               -----------         ------                     COVID-19 and FLU A/B PCR...[[id number]]  Normal              Final result                 Please view results for these tests on the individual orders.    COVID-19 and FLU A/B PCR - Swab, Nasopharynx [[id number]]  (Normal) Collected: 03/05/23 1934    Lab Status: Final result Specimen: Swab from Nasopharynx Updated: 03/05/23 2002     COVID19 Not Detected     Influenza A PCR Not Detected     Influenza B PCR Not Detected     Narrative:      Fact sheet for providers: https://www.fda.gov/media/091528/download    Fact sheet for patients: https://www.fda.gov/media/592570/download    Test performed by PCR.          Adult Transthoracic Echo Complete W/ Cont if Necessary Per Protocol    Result Date: 3/6/2023  •  Left ventricular systolic function is hyperdynamic (EF > 70%). Calculated left ventricular EF = 74% •  Left ventricular diastolic function is consistent with (grade I) impaired relaxation. •  There is a small (<1cm) pericardial effusion.     CT Abdomen Pelvis With Contrast    Result Date: 3/5/2023  EXAMINATION:  CTA CHEST, CT ABDOMEN AND PELVIS WITH IV CONTRAST DATE OF EXAM: 3/5/2023 8:44 PM HISTORY: chest pain , abdominal pain and distention TECHNIQUE: CTA of the chest followed by routine CT abdomen and pelvis was performed following the intravenous administration of iodinated contrast. Sagittal, coronal and 3-D reformatted images were provided. CT dose lowering techniques were used, to include: automated exposure control, adjustment for patient size, and or use of iterative reconstruction. 3-D MIP images were performed under concurrent supervision not requiring an independent workstation, in order to better visualize the vasculature. COMPARISON: None FINDINGS: CHEST CTA: Lungs:  Moderate peripheral and lobular septal thickening. There is also minor patchy groundglass in the upper lungs. Bilateral dependent opacities right lung greater than left. There is some calcific densities at the lung bases, possibly prior barium aspiration. There are moderate emphysematous changes in the periphery of the right upper lobe. Pleura: Small bilateral pleural effusions. Mediastinum And Gillian: Top normal size mediastinal lymph nodes. Pulmonary Arteries: Suboptimally opacified. No filling defect. 3-D images corroborate 2-D findings. Cardiovascular: Small pericardial effusion. No thoracic aortic aneurysm or dissection . Minor coronary artery  atherosclerotic calcifications. Chest Wall:  Normal. ABDOMEN/PELVIS: Liver: There are a few small cysts. Other subcentimeter hypodensities too small to characterize. Gallbladder/Biliary: Gallbladder lumen is distended. There is suggestion of a focal area of gallbladder wall edema. No radiopaque gallstone. Pancreas: Normal. Spleen: Normal. Adrenal Glands: Normal. Kidneys: Normal. GI Tract: No bowel dilation. Mesentery/Peritoneum: Trace ascites. Vasculature: Minor atherosclerotic calcifications. Lymph Nodes: Bilateral enlarged inguinal lymph nodes which retains fatty edgar and reniform shape Abdominal Wall: Minor body wall edema. Bladder:  Decompressed Reproductive: Normal. MUSCULOSKELETAL: Degenerative changes in the spine     Impression: 1.  Moderate CHF/volume overload including small bilateral pleural effusions, small pericardial effusion, and pulmonary interstitial edema. In this setting the minor patchy groundglass pulmonary opacities likely represent alveolar edema. Recommend follow-up chest CT in six months to ensure resolution. 2.  Gallbladder luminal distention and suspected focal wall thickening. Recommend further evaluation with ultrasound or HIDA for acute cholecystitis. 3.  Enlarged bilateral inguinal lymph nodes, favored to be reactive given morphology. Correlate for lower extremity infection. 4.  No evidence of pulmonary embolus. 5.  Chronic findings as above. Electronically signed by:  Dario Voss M.D.  3/5/2023 7:36 PM Mountain Time    US Gallbladder    Result Date: 3/6/2023  US GALLBLADDER Date of Exam: 3/6/2023 9:07 AM EST Indication: Abdominal swelling, gallbladder wall thickening on CT. Comparison: CT abdomen 3/5/2023 Technique: Grayscale and color Doppler ultrasound evaluation of the right upper quadrant was performed. Findings: Limited images of the pancreas are unremarkable. Liver parenchyma is heterogeneous. There are multiple hepatic cysts. No suspicious solid appearing liver lesion  identified. Hepatic vasculature is within normal limits. Gallbladder is within normal limits in size. No gallstones are seen. There is no gallbladder wall thickening or pericholecystic fluid. Common hepatic duct is within normal limits measuring 3 mm The right kidney measures 10.3 cm in [id number] length. There is normal thickness and normal echogenicity of the renal parenchyma. There is no hydronephrosis. Small right pleural effusion is noted.     Impression: Impression: 1. Gallbladder appears within normal limits. No gall but are well thickening or pericholecystic fluid. No gallstones identified. No biliary tract obstruction. 2. Small right pleural effusion 3. Multiple hepatic cyst Electronically Signed: Sunil Block  3/6/2023 9:28 AM EST  Workstation ID: [id number]    XR Chest 1 View    Result Date: 3/5/2023  XR CHEST 1 VW Date of Exam: 3/5/2023 7:06 PM EST Indication: SOA triage protocol. Comparison: CT chest 5/18/2020 Findings: Lungs are normally expanded. Severe cardiomegaly. Airspace opacity in lung bases and right middle lobe. No pneumothorax.     Impression: Impression: Multifocal airspace opacity right lung worse than left with cardiomegaly. Multifocal pneumonia is favored over pulmonary edema. Electronically Signed: Francoise Hightower  3/5/2023 7:47 PM EST  Workstation ID: [id number]    CT Angiogram Chest Pulmonary Embolism    Result Date: 3/5/2023  EXAMINATION:  CTA CHEST, CT ABDOMEN AND PELVIS WITH IV CONTRAST DATE OF EXAM: 3/5/2023 8:44 PM HISTORY: chest pain , abdominal pain and distention TECHNIQUE: CTA of the chest followed by routine CT abdomen and pelvis was performed following the intravenous administration of iodinated contrast. Sagittal, coronal and 3-D reformatted images were provided. CT dose lowering techniques were used, to include: automated exposure control, adjustment for patient size, and or use of iterative reconstruction. 3-D MIP images were performed under concurrent supervision not requiring  an independent workstation, in order to better visualize the vasculature. COMPARISON: None FINDINGS: CHEST CTA: Lungs:  Moderate peripheral and lobular septal thickening. There is also minor patchy groundglass in the upper lungs. Bilateral dependent opacities right lung greater than left. There is some calcific densities at the lung bases, possibly prior barium aspiration. There are moderate emphysematous changes in the periphery of the right upper lobe. Pleura: Small bilateral pleural effusions. Mediastinum And Gillian: Top normal size mediastinal lymph nodes. Pulmonary Arteries: Suboptimally opacified. No filling defect. 3-D images corroborate 2-D findings. Cardiovascular: Small pericardial effusion. No thoracic aortic aneurysm or dissection . Minor coronary artery atherosclerotic calcifications. Chest Wall:  Normal. ABDOMEN/PELVIS: Liver: There are a few small cysts. Other subcentimeter hypodensities too small to characterize. Gallbladder/Biliary: Gallbladder lumen is distended. There is suggestion of a focal area of gallbladder wall edema. No radiopaque gallstone. Pancreas: Normal. Spleen: Normal. Adrenal Glands: Normal. Kidneys: Normal. GI Tract: No bowel dilation. Mesentery/Peritoneum: Trace ascites. Vasculature: Minor atherosclerotic calcifications. Lymph Nodes: Bilateral enlarged inguinal lymph nodes which retains fatty gillian and reniform shape Abdominal Wall: Minor body wall edema. Bladder:  Decompressed Reproductive: Normal. MUSCULOSKELETAL: Degenerative changes in the spine     Impression: 1.  Moderate CHF/volume overload including small bilateral pleural effusions, small pericardial effusion, and pulmonary interstitial edema. In this setting the minor patchy groundglass pulmonary opacities likely represent alveolar edema. Recommend follow-up chest CT in six months to ensure resolution. 2.  Gallbladder luminal distention and suspected focal wall thickening. Recommend further evaluation with ultrasound or HIDA  for acute cholecystitis. 3.  Enlarged bilateral inguinal lymph nodes, favored to be reactive given morphology. Correlate for lower extremity infection. 4.  No evidence of pulmonary embolus. 5.  Chronic findings as above. Electronically signed by:  Dario Voss M.D.  3/5/2023 7:36 PM Mountain Time    XR Abdomen KUB    Result Date: 3/6/2023  XR ABDOMEN KUB Date of Exam: 3/6/2023 3:50 PM EST Indication: abdominal bloating pain Comparison: CT abdomen and pelvis 3/5/2023 Findings: There is mild gaseous distention of small bowel and colon. No abnormal calcifications are seen. Included lung bases are grossly clear.     Impression: Impression: Mild gaseous distention of small bowel and colon in a pattern suggestive of ileus. Electronically Signed: Dee Louis  3/6/2023 4:51 PM EST  Workstation ID: [id number]      Results for orders placed during the hospital encounter of 03/05/23    Adult Transthoracic Echo Complete W/ Cont if Necessary Per Protocol    Interpretation Summary  •  Left ventricular systolic function is hyperdynamic (EF > 70%). Calculated left ventricular EF = 74%  •  Left ventricular diastolic function is consistent with (grade I) impaired relaxation.  •  There is a small (<1cm) pericardial effusion.      Current medications:  Scheduled Meds:amLODIPine, 10 mg, Oral, Q24H  aspirin, 81 mg, Oral, Daily  atorvastatin, 80 mg, Oral, Nightly  [START ON 3/7/2023] empagliflozin, 10 mg, Oral, Daily  [START ON 3/7/2023] hydrALAZINE, 50 mg, Oral, BID  insulin lispro, 0-9 Units, Subcutaneous, TID AC  ipratropium-albuterol, 3 mL, Nebulization, 4x Daily - RT  [START ON 3/7/2023] metoclopramide, 5 mg, Oral, TID AC  metoprolol succinate XL, 100 mg, Oral, BID  pharmacy consult - MTM, , Does not apply, BID  sodium chloride, 10 mL, Intravenous, Q12H  terazosin, 5 mg, Oral, Q12H      Continuous Infusions:   PRN Meds:.•  acetaminophen  •  dextrose  •  dextrose  •  glucagon (human recombinant)  •  melatonin  •  ondansetron **OR**  ondansetron  •  sodium chloride  •  sodium chloride  •  sodium chloride    Assessment & Plan   Assessment & Plan     Active Hospital Problems    Diagnosis  POA   • **Acute on chronic congestive heart failure, unspecified heart failure type (HCC) [I50.9]  Yes   • Thrombocytopenia ([facility]) [D69.6]  Yes   • CKD (chronic kidney disease) stage 3, GFR 30-59 ml/min ([facility]) [N18.30]  Yes   • Essential hypertension [I10]  Yes   • JONAH (obstructive sleep apnea) [G47.33]  Yes   • Rheumatoid arthritis (HCC) [M06.9]  Yes   • COPD (chronic obstructive pulmonary disease) ([facility]) [J44.9]  Yes   • Type 2 diabetes mellitus ([facility]) [E11.9]  Yes   • Inguinal lymphadenopathy [R59.0]  Yes   • Anemia [D64.9]  Yes      Resolved Hospital Problems   No resolved problems to display.        Brief Hospital Course to date:  Miguel Camejo is a 78 y.o. male with history of rheumatoid arthritis, hypertension, hyperlipidemia, diabetes, sleep apnea, obesity, pancytopenia, GERD who presented to the ER with worsening dyspnea and bilateral lower extremity edema.   He reports dyspnea on exertion.   He sleeps in a recliner most of the time...    Dyspnea on exertion  Lower extremity swelling  -Concern would be for right heart dysfunction from sleep apnea  -Echocardiogram  -COPD/emphysema -inhalers for his shortness of breath over the past several days have not improved his symptoms  -Possible mild volume - possible mild CHF but has normal EF  Acute on chronic heart failure with preserved ejection fraction  -Bumex given this afternoon  -Wean oxygen as able  -Holding ARB pending renal function  Concern for ileus  -Trial of Reglan  Gallbladder distention and thickening  -Ultrasound gallbladder  -GI symptoms after eating including bloating and fullness  -General surgery evaluation  Pericardial effusion  -Small pericardial effusion on echo  Sleep apnea  - Order CPAP machine for sleep and as needed  Troponin leak  -Has a flat troponin trend  Obesity  -BMI greater  than 30  Hypertension  -Antihypertensives as tolerated  Diabetes  -Hemoglobin A1c appears to be tightly controlled  Iron deficiency anemia  -He appears to be on aspirin and ascorbic acid  Inguinal lymphadenopathy  -Plan for IR guided biopsy    Ultrasound gallbladder - symptoms reported today that may be consistent with gallbladder disease    Expected Discharge Location and Transportation: Home  Expected Discharge   Expected Discharge Date and Time     Expected Discharge Date Expected Discharge Time    Mar 10, 2023            DVT prophylaxis:  Mechanical DVT prophylaxis orders are present.     AM-PAC 6 Clicks Score (PT): 23 (03/06/23 4827)    CODE STATUS:   Code Status and Medical Interventions:   Ordered at: 03/05/23 7954     Code Status (Patient has no pulse and is not breathing):    CPR (Attempt to Resuscitate)     Medical Interventions (Patient has pulse or is breathing):    Full Support       Paul Castro MD  03/06/23

## 2023-03-07 ENCOUNTER — APPOINTMENT (OUTPATIENT)
Dept: NUCLEAR MEDICINE | Facility: HOSPITAL | Age: 78
End: 2023-03-07
Payer: MEDICARE

## 2023-03-07 ENCOUNTER — APPOINTMENT (OUTPATIENT)
Dept: ULTRASOUND IMAGING | Facility: HOSPITAL | Age: 78
End: 2023-03-07
Payer: MEDICARE

## 2023-03-07 LAB
ALBUMIN SERPL-MCNC: 3.4 G/DL (ref 3.5–5.2)
ALBUMIN/GLOB SERPL: 1.5 G/DL
ALP SERPL-CCNC: 63 U/L (ref 39–117)
ALT SERPL W P-5'-P-CCNC: 13 U/L (ref 1–41)
ANION GAP SERPL CALCULATED.3IONS-SCNC: 7 MMOL/L (ref 5–15)
AST SERPL-CCNC: 16 U/L (ref 1–40)
BASOPHILS # BLD AUTO: 0.02 10*3/MM3 (ref 0–0.2)
BASOPHILS NFR BLD AUTO: 0.5 % (ref 0–1.5)
BILIRUB SERPL-MCNC: 0.4 MG/DL (ref 0–1.2)
BUN SERPL-MCNC: 37 MG/DL (ref 8–23)
BUN/CREAT SERPL: 22.8 (ref 7–25)
CALCIUM SPEC-SCNC: 8.1 MG/DL (ref 8.6–10.5)
CHLORIDE SERPL-SCNC: 108 MMOL/L (ref 98–107)
CO2 SERPL-SCNC: 28 MMOL/L (ref 22–29)
CREAT SERPL-MCNC: 1.62 MG/DL (ref 0.76–1.27)
DEPRECATED RDW RBC AUTO: 43.9 FL (ref 37–54)
EGFRCR SERPLBLD CKD-EPI 2021: 43.2 ML/MIN/1.73
EOSINOPHIL # BLD AUTO: 0 10*3/MM3 (ref 0–0.4)
EOSINOPHIL NFR BLD AUTO: 0 % (ref 0.3–6.2)
ERYTHROCYTE [DISTWIDTH] IN BLOOD BY AUTOMATED COUNT: 13.3 % (ref 12.3–15.4)
GLOBULIN UR ELPH-MCNC: 2.3 GM/DL
GLUCOSE BLDC GLUCOMTR-MCNC: 103 MG/DL (ref 70–130)
GLUCOSE SERPL-MCNC: 107 MG/DL (ref 65–99)
HCT VFR BLD AUTO: 25.5 % (ref 37.5–51)
HGB BLD-MCNC: 8.4 G/DL (ref 13–17.7)
IMM GRANULOCYTES # BLD AUTO: 0.02 10*3/MM3 (ref 0–0.05)
IMM GRANULOCYTES NFR BLD AUTO: 0.5 % (ref 0–0.5)
LYMPHOCYTES # BLD AUTO: 0.75 10*3/MM3 (ref 0.7–3.1)
LYMPHOCYTES NFR BLD AUTO: 18.2 % (ref 19.6–45.3)
MCH RBC QN AUTO: 30 PG (ref 26.6–33)
MCHC RBC AUTO-ENTMCNC: 32.9 G/DL (ref 31.5–35.7)
MCV RBC AUTO: 91.1 FL (ref 79–97)
MONOCYTES # BLD AUTO: 0.51 10*3/MM3 (ref 0.1–0.9)
MONOCYTES NFR BLD AUTO: 12.3 % (ref 5–12)
NEUTROPHILS NFR BLD AUTO: 2.83 10*3/MM3 (ref 1.7–7)
NEUTROPHILS NFR BLD AUTO: 68.5 % (ref 42.7–76)
NRBC BLD AUTO-RTO: 0 /100 WBC (ref 0–0.2)
PLATELET # BLD AUTO: 142 10*3/MM3 (ref 140–450)
PMV BLD AUTO: 10.3 FL (ref 6–12)
POTASSIUM SERPL-SCNC: 4.9 MMOL/L (ref 3.5–5.2)
PROT SERPL-MCNC: 5.7 G/DL (ref 6–8.5)
RBC # BLD AUTO: 2.8 10*6/MM3 (ref 4.14–5.8)
SODIUM SERPL-SCNC: 143 MMOL/L (ref 136–145)
WBC NRBC COR # BLD: 4.13 10*3/MM3 (ref 3.4–10.8)

## 2023-03-07 PROCEDURE — 85025 COMPLETE CBC W/AUTO DIFF WBC: CPT | Performed by: PHYSICIAN ASSISTANT

## 2023-03-07 PROCEDURE — 94664 DEMO&/EVAL PT USE INHALER: CPT

## 2023-03-07 PROCEDURE — 96375 TX/PRO/DX INJ NEW DRUG ADDON: CPT

## 2023-03-07 PROCEDURE — 94799 UNLISTED PULMONARY SVC/PX: CPT

## 2023-03-07 PROCEDURE — 78227 HEPATOBIL SYST IMAGE W/DRUG: CPT

## 2023-03-07 PROCEDURE — 82962 GLUCOSE BLOOD TEST: CPT

## 2023-03-07 PROCEDURE — 94761 N-INVAS EAR/PLS OXIMETRY MLT: CPT

## 2023-03-07 PROCEDURE — 99232 SBSQ HOSP IP/OBS MODERATE 35: CPT | Performed by: INTERNAL MEDICINE

## 2023-03-07 PROCEDURE — 25010000002 SINCALIDE PER 5 MCG: Performed by: HOSPITALIST

## 2023-03-07 PROCEDURE — 0 TECHNETIUM TC 99M MEBROFENIN KIT: Performed by: HOSPITALIST

## 2023-03-07 PROCEDURE — 76942 ECHO GUIDE FOR BIOPSY: CPT

## 2023-03-07 PROCEDURE — 88307 TISSUE EXAM BY PATHOLOGIST: CPT | Performed by: HOSPITALIST

## 2023-03-07 PROCEDURE — 99232 SBSQ HOSP IP/OBS MODERATE 35: CPT | Performed by: NURSE PRACTITIONER

## 2023-03-07 PROCEDURE — A9537 TC99M MEBROFENIN: HCPCS | Performed by: HOSPITALIST

## 2023-03-07 PROCEDURE — 80053 COMPREHEN METABOLIC PANEL: CPT | Performed by: HOSPITALIST

## 2023-03-07 PROCEDURE — 99232 SBSQ HOSP IP/OBS MODERATE 35: CPT | Performed by: HOSPITALIST

## 2023-03-07 RX ORDER — FUROSEMIDE 40 MG/1
40 TABLET ORAL DAILY
Status: DISCONTINUED | OUTPATIENT
Start: 2023-03-08 | End: 2023-03-08 | Stop reason: HOSPADM

## 2023-03-07 RX ORDER — LIDOCAINE HYDROCHLORIDE 10 MG/ML
10 INJECTION, SOLUTION EPIDURAL; INFILTRATION; INTRACAUDAL; PERINEURAL ONCE
Status: COMPLETED | OUTPATIENT
Start: 2023-03-07 | End: 2023-03-07

## 2023-03-07 RX ORDER — KIT FOR THE PREPARATION OF TECHNETIUM TC 99M MEBROFENIN 45 MG/10ML
1 INJECTION, POWDER, LYOPHILIZED, FOR SOLUTION INTRAVENOUS
Status: COMPLETED | OUTPATIENT
Start: 2023-03-07 | End: 2023-03-07

## 2023-03-07 RX ADMIN — METOPROLOL SUCCINATE 100 MG: 50 TABLET, EXTENDED RELEASE ORAL at 21:02

## 2023-03-07 RX ADMIN — LIDOCAINE HYDROCHLORIDE 10 ML: 10 INJECTION, SOLUTION EPIDURAL; INFILTRATION; INTRACAUDAL; PERINEURAL at 08:20

## 2023-03-07 RX ADMIN — HYDRALAZINE HYDROCHLORIDE 50 MG: 50 TABLET, FILM COATED ORAL at 21:03

## 2023-03-07 RX ADMIN — MEBROFENIN 1 DOSE: 45 INJECTION, POWDER, LYOPHILIZED, FOR SOLUTION INTRAVENOUS at 12:00

## 2023-03-07 RX ADMIN — ASPIRIN 81 MG: 81 TABLET, COATED ORAL at 10:11

## 2023-03-07 RX ADMIN — SINCALIDE 2.2 MCG: 5 INJECTION, POWDER, LYOPHILIZED, FOR SOLUTION INTRAVENOUS at 13:10

## 2023-03-07 RX ADMIN — METOCLOPRAMIDE 5 MG: 10 TABLET ORAL at 10:10

## 2023-03-07 RX ADMIN — TERAZOSIN HYDROCHLORIDE 5 MG: 5 CAPSULE ORAL at 10:10

## 2023-03-07 RX ADMIN — IPRATROPIUM BROMIDE AND ALBUTEROL SULFATE 3 ML: 2.5; .5 SOLUTION RESPIRATORY (INHALATION) at 07:37

## 2023-03-07 RX ADMIN — Medication 10 ML: at 21:10

## 2023-03-07 RX ADMIN — Medication 10 ML: at 10:12

## 2023-03-07 RX ADMIN — ATORVASTATIN CALCIUM 80 MG: 40 TABLET, FILM COATED ORAL at 21:03

## 2023-03-07 RX ADMIN — EMPAGLIFLOZIN 10 MG: 10 TABLET, FILM COATED ORAL at 10:11

## 2023-03-07 RX ADMIN — TERAZOSIN HYDROCHLORIDE 5 MG: 5 CAPSULE ORAL at 21:03

## 2023-03-07 RX ADMIN — AMLODIPINE BESYLATE 10 MG: 10 TABLET ORAL at 10:10

## 2023-03-07 RX ADMIN — IPRATROPIUM BROMIDE AND ALBUTEROL SULFATE 3 ML: 2.5; .5 SOLUTION RESPIRATORY (INHALATION) at 19:32

## 2023-03-07 RX ADMIN — METOPROLOL SUCCINATE 100 MG: 50 TABLET, EXTENDED RELEASE ORAL at 10:10

## 2023-03-07 RX ADMIN — Medication 5 MG: at 21:03

## 2023-03-07 RX ADMIN — METOCLOPRAMIDE 5 MG: 10 TABLET ORAL at 17:37

## 2023-03-07 NOTE — PROGRESS NOTES
[facility] Medicine Services  PROGRESS NOTE    Patient Name: Miguel Camejo  : 1945  MRN: [id number]    Date of Admission: 3/5/2023  Primary Care Physician: Cuong Hairston MD    Subjective   Subjective     CC:   Abdominal Distention / Bloating    HPI:   Biliary dyskinesia on HIDA.  Some better but still quite bloated.  Abdomen seems full and tympanic    ROS:    Gen - no chills, no fevers  CV- No chest pain, palpitations  Resp- No cough, dyspnea  GI- No N/V/D, abd pain    Objective   Objective     Vital Signs:   Temp:  [98.1 °F (36.7 °C)-98.8 °F (37.1 °C)] 98.2 °F (36.8 °C)  Heart Rate:  [59-78] 68  Resp:  [16-18] 16  BP: (121-144)/(61-74) 124/62  Flow (L/min):  [2] 2     Physical Exam:    Constitutional: No acute distress, awake, alert  HENT: NCAT, mucous membranes moist  Respiratory: Clear to auscultation bilaterally, respiratory effort normal   Cardiovascular: RRR, S1-S2  Gastrointestinal: Positive bowel sounds, firm, nontender, distended appearing abdomen  Musculoskeletal: No bilateral ankle edema  Psychiatric: Appropriate affect, cooperative  Skin: dry, + skin tenting    Results Reviewed:  LAB RESULTS:      Lab 23   WBC 4.13 3.04*  --  3.85   HEMOGLOBIN 8.4* 9.6*  --  9.5*   HEMATOCRIT 25.5* 29.8*  --  29.4*   PLATELETS 142 146  --  124*   NEUTROS ABS 2.83 2.38  --  2.37   IMMATURE GRANS (ABS) 0.02 0.01  --  0.02   LYMPHS ABS 0.75 0.52*  --  0.89   MONOS ABS 0.51 0.11  --  0.39   EOS ABS 0.00 0.00  --  0.14   MCV 91.1 92.3  --  93.0   PROCALCITONIN  --   --   --  0.07   LACTATE  --   --  0.9  --          Lab 23   SODIUM 143 143 145   POTASSIUM 4.9 5.1 4.6   CHLORIDE 108* 107 111*   CO2 28.0 25.0 27.0   ANION GAP 7.0 11.0 7.0   BUN 37* 32* 26*   CREATININE 1.62* 1.69* 1.49*   EGFR 43.2* 41.0* 47.7*   GLUCOSE 107* 121* 104*   CALCIUM 8.1* 8.9 8.4*   HEMOGLOBIN A1C  --  5.10   --          Lab 03/07/23 0619 03/05/23 1917   TOTAL PROTEIN 5.7* 6.8   ALBUMIN 3.4* 3.8   GLOBULIN 2.3 3.0   ALT (SGPT) 13 15   AST (SGOT) 16 19   BILIRUBIN 0.4 0.5   ALK PHOS 63 80         Lab 03/06/23  0727 03/06/23  0021 03/05/23 1917   PROBNP  --   --  2,179.0*   HSTROP T 48* 49* 53*         Lab 03/06/23 0727   CHOLESTEROL 130   LDL CHOL 80   HDL CHOL 37*   TRIGLYCERIDES 59             Lab 03/05/23 2039   PH, ARTERIAL 7.315*   PCO2, ARTERIAL 53.2*   PO2 ART 74.8*   FIO2 32   HCO3 ART 27.1*   BASE EXCESS ART 0.3   CARBOXYHEMOGLOBIN 1.8     Brief Urine Lab Results     None          Microbiology Results Abnormal     Procedure Component Value - Date/Time    Blood Culture - Blood, Arm, Left [[id number]]  (Normal) Collected: 03/05/23 1942    Lab Status: Preliminary result Specimen: Blood from Arm, Left Updated: 03/06/23 2000     Blood Culture No growth at 24 hours    Blood Culture - Blood, Arm, Right [[id number]]  (Normal) Collected: 03/05/23 1933    Lab Status: Preliminary result Specimen: Blood from Arm, Right Updated: 03/06/23 2000     Blood Culture No growth at 24 hours    COVID PRE-OP / PRE-PROCEDURE SCREENING ORDER (NO ISOLATION) - Swab, Nasopharynx [[id number]]  (Normal) Collected: 03/05/23 1934    Lab Status: Final result Specimen: Swab from Nasopharynx Updated: 03/05/23 2002    Narrative:      The following orders were created for panel order COVID PRE-OP / PRE-PROCEDURE SCREENING ORDER (NO ISOLATION) - Swab, Nasopharynx.  Procedure                               Abnormality         Status                     ---------                               -----------         ------                     COVID-19 and FLU A/B PCR...[[id number]]  Normal              Final result                 Please view results for these tests on the individual orders.    COVID-19 and FLU A/B PCR - Swab, Nasopharynx [[id number]]  (Normal) Collected: 03/05/23 1934    Lab Status: Final result Specimen: Swab from Nasopharynx Updated:  03/05/23 2002     COVID19 Not Detected     Influenza A PCR Not Detected     Influenza B PCR Not Detected    Narrative:      Fact sheet for providers: https://www.fda.gov/media/863551/download    Fact sheet for patients: https://www.fda.gov/media/439725/download    Test performed by PCR.          Adult Transthoracic Echo Complete W/ Cont if Necessary Per Protocol    Result Date: 3/6/2023  •  Left ventricular systolic function is hyperdynamic (EF > 70%). Calculated left ventricular EF = 74% •  Left ventricular diastolic function is consistent with (grade I) impaired relaxation. •  There is a small (<1cm) pericardial effusion.     CT Abdomen Pelvis With Contrast    Result Date: 3/5/2023  EXAMINATION:  CTA CHEST, CT ABDOMEN AND PELVIS WITH IV CONTRAST DATE OF EXAM: 3/5/2023 8:44 PM HISTORY: chest pain , abdominal pain and distention TECHNIQUE: CTA of the chest followed by routine CT abdomen and pelvis was performed following the intravenous administration of iodinated contrast. Sagittal, coronal and 3-D reformatted images were provided. CT dose lowering techniques were used, to include: automated exposure control, adjustment for patient size, and or use of iterative reconstruction. 3-D MIP images were performed under concurrent supervision not requiring an independent workstation, in order to better visualize the vasculature. COMPARISON: None FINDINGS: CHEST CTA: Lungs:  Moderate peripheral and lobular septal thickening. There is also minor patchy groundglass in the upper lungs. Bilateral dependent opacities right lung greater than left. There is some calcific densities at the lung bases, possibly prior barium aspiration. There are moderate emphysematous changes in the periphery of the right upper lobe. Pleura: Small bilateral pleural effusions. Mediastinum And Gillian: Top normal size mediastinal lymph nodes. Pulmonary Arteries: Suboptimally opacified. No filling defect. 3-D images corroborate 2-D findings.  Cardiovascular: Small pericardial effusion. No thoracic aortic aneurysm or dissection . Minor coronary artery atherosclerotic calcifications. Chest Wall:  Normal. ABDOMEN/PELVIS: Liver: There are a few small cysts. Other subcentimeter hypodensities too small to characterize. Gallbladder/Biliary: Gallbladder lumen is distended. There is suggestion of a focal area of gallbladder wall edema. No radiopaque gallstone. Pancreas: Normal. Spleen: Normal. Adrenal Glands: Normal. Kidneys: Normal. GI Tract: No bowel dilation. Mesentery/Peritoneum: Trace ascites. Vasculature: Minor atherosclerotic calcifications. Lymph Nodes: Bilateral enlarged inguinal lymph nodes which retains fatty edgar and reniform shape Abdominal Wall: Minor body wall edema. Bladder:  Decompressed Reproductive: Normal. MUSCULOSKELETAL: Degenerative changes in the spine     Impression: 1.  Moderate CHF/volume overload including small bilateral pleural effusions, small pericardial effusion, and pulmonary interstitial edema. In this setting the minor patchy groundglass pulmonary opacities likely represent alveolar edema. Recommend follow-up chest CT in six months to ensure resolution. 2.  Gallbladder luminal distention and suspected focal wall thickening. Recommend further evaluation with ultrasound or HIDA for acute cholecystitis. 3.  Enlarged bilateral inguinal lymph nodes, favored to be reactive given morphology. Correlate for lower extremity infection. 4.  No evidence of pulmonary embolus. 5.  Chronic findings as above. Electronically signed by:  Dario Voss M.D.  3/5/2023 7:36 PM Mountain Time    US Gallbladder    Result Date: 3/6/2023  US GALLBLADDER Date of Exam: 3/6/2023 9:07 AM EST Indication: Abdominal swelling, gallbladder wall thickening on CT. Comparison: CT abdomen 3/5/2023 Technique: Grayscale and color Doppler ultrasound evaluation of the right upper quadrant was performed. Findings: Limited images of the pancreas are unremarkable. Liver  parenchyma is heterogeneous. There are multiple hepatic cysts. No suspicious solid appearing liver lesion identified. Hepatic vasculature is within normal limits. Gallbladder is within normal limits in size. No gallstones are seen. There is no gallbladder wall thickening or pericholecystic fluid. Common hepatic duct is within normal limits measuring 3 mm The right kidney measures 10.3 cm in [id number] length. There is normal thickness and normal echogenicity of the renal parenchyma. There is no hydronephrosis. Small right pleural effusion is noted.     Impression: Impression: 1. Gallbladder appears within normal limits. No gall but are well thickening or pericholecystic fluid. No gallstones identified. No biliary tract obstruction. 2. Small right pleural effusion 3. Multiple hepatic cyst Electronically Signed: Sunil Block  3/6/2023 9:28 AM EST  Workstation ID: [id number]    NM HIDA SCAN WITH PHARMACOLOGICAL INTERVENTION    Result Date: 3/7/2023  DATE OF EXAM: 3/7/2023 12:07 PM EST PROCEDURE: NM HIDA SCAN WITH PHARMACOLOGICAL INTERVENTION INDICATIONS: concern for gallbladder disease COMPARISON: Gallbladder ultrasound 3/6/2023. TECHNIQUE: The patient received 7.30 mCi of mebrofenin intravenously and images were obtained of the abdomen in the anterior projection through 60 minutes. Patient was administered 2.2mcg of Kinevac to assess gallbladder emptying. Counts were obtained over the gallbladder to calculate the ejection fraction. FINDINGS: Normal radiopharmaceutical uptake in the liver. Gallbladder is visualized within the first 20 minutes of imaging, indicating patency of the cystic duct. No scintigraphic evidence of acute or chronic cholecystitis. Progression of radiopharmaceutical into the small bowel indicates patency of the common bile duct. The calculated gallbladder ejection fraction is abnormally low at 19%.     Impression: 1. No scintigraphic evidence of acute or chronic cholecystitis. 2. Abnormally low  gallbladder ejection fraction, 19%, which may represent changes of biliary dyskinesia. Electronically Signed: Mary Jones  3/7/2023 2:23 PM EST  Workstation ID: [id number]    XR Chest 1 View    Result Date: 3/5/2023  XR CHEST 1 VW Date of Exam: 3/5/2023 7:06 PM EST Indication: SOA triage protocol. Comparison: CT chest 5/18/2020 Findings: Lungs are normally expanded. Severe cardiomegaly. Airspace opacity in lung bases and right middle lobe. No pneumothorax.     Impression: Impression: Multifocal airspace opacity right lung worse than left with cardiomegaly. Multifocal pneumonia is favored over pulmonary edema. Electronically Signed: Francoise Hightower  3/5/2023 7:47 PM EST  Workstation ID: [id number]    CT Angiogram Chest Pulmonary Embolism    Result Date: 3/5/2023  EXAMINATION:  CTA CHEST, CT ABDOMEN AND PELVIS WITH IV CONTRAST DATE OF EXAM: 3/5/2023 8:44 PM HISTORY: chest pain , abdominal pain and distention TECHNIQUE: CTA of the chest followed by routine CT abdomen and pelvis was performed following the intravenous administration of iodinated contrast. Sagittal, coronal and 3-D reformatted images were provided. CT dose lowering techniques were used, to include: automated exposure control, adjustment for patient size, and or use of iterative reconstruction. 3-D MIP images were performed under concurrent supervision not requiring an independent workstation, in order to better visualize the vasculature. COMPARISON: None FINDINGS: CHEST CTA: Lungs:  Moderate peripheral and lobular septal thickening. There is also minor patchy groundglass in the upper lungs. Bilateral dependent opacities right lung greater than left. There is some calcific densities at the lung bases, possibly prior barium aspiration. There are moderate emphysematous changes in the periphery of the right upper lobe. Pleura: Small bilateral pleural effusions. Mediastinum And Gillian: Top normal size mediastinal lymph nodes. Pulmonary Arteries: Suboptimally  opacified. No filling defect. 3-D images corroborate 2-D findings. Cardiovascular: Small pericardial effusion. No thoracic aortic aneurysm or dissection . Minor coronary artery atherosclerotic calcifications. Chest Wall:  Normal. ABDOMEN/PELVIS: Liver: There are a few small cysts. Other subcentimeter hypodensities too small to characterize. Gallbladder/Biliary: Gallbladder lumen is distended. There is suggestion of a focal area of gallbladder wall edema. No radiopaque gallstone. Pancreas: Normal. Spleen: Normal. Adrenal Glands: Normal. Kidneys: Normal. GI Tract: No bowel dilation. Mesentery/Peritoneum: Trace ascites. Vasculature: Minor atherosclerotic calcifications. Lymph Nodes: Bilateral enlarged inguinal lymph nodes which retains fatty edgar and reniform shape Abdominal Wall: Minor body wall edema. Bladder:  Decompressed Reproductive: Normal. MUSCULOSKELETAL: Degenerative changes in the spine     Impression: 1.  Moderate CHF/volume overload including small bilateral pleural effusions, small pericardial effusion, and pulmonary interstitial edema. In this setting the minor patchy groundglass pulmonary opacities likely represent alveolar edema. Recommend follow-up chest CT in six months to ensure resolution. 2.  Gallbladder luminal distention and suspected focal wall thickening. Recommend further evaluation with ultrasound or HIDA for acute cholecystitis. 3.  Enlarged bilateral inguinal lymph nodes, favored to be reactive given morphology. Correlate for lower extremity infection. 4.  No evidence of pulmonary embolus. 5.  Chronic findings as above. Electronically signed by:  Dario Voss M.D.  3/5/2023 7:36 PM Mountain Time    US Guided Lymph Node Biopsy    Result Date: 3/7/2023  US GUIDED LYMPH NODE BIOPSY                                                         History: recommended by Oncology    : Daniel Gallagher MD.                                                                              Modality: Ultrasound                                                                                                      No Sedation was used.          Anesthesia: Lidocaine local infiltration. Estimated blood loss:  < 5 cc.         Technique: A thorough discussion of the risks, benefits, and alternatives of the procedure, blood transfusion if needed, and if applicable, moderate sedation was carried out with the patient or the patient's next of kin. Any questions were answered. They verbalized  understanding. A written informed consent was then signed.  A timeout was performed prior to starting the procedure. The procedure room personnel used personal protective equipment. The operators used sterile gowns and gloves in addition. The surgical site was prepped with chlorhexidine gluconate and draped in the maximal sterile fashion. A preliminary ultrasonography was performed to assess the target and determine a safe access site. It showed pathologic left inguinal lymph node.. Pertinent ultrasound images were secured to the PACS for documentation. A left inguinal crease access site was selected and sterilely prepped and draped. A dermatotomy was performed. Using aseptic precautions, under real-time ultrasonographic guidance, the target lesion was accessed with a 17-gauge guide. Using an 18-gauge coaxial biopsy device, multiple Core biopsies of the targeted tissue were performed. The specimen was submitted in formalin for further processing. At the end of the procedure, an aseptic dressing applied. The patient tolerated the procedure well. After recovery, the patient was discharged from the department in stable condition.           Complications: None immediate. Cores: Number of Cores if obtained: Not applicable Specimen: The specimen was labeled and sent to the lab in appropriate carriers & containers if such was requested by the ordering provider.                                                                          Impression: Impression:                                                              Successful ultrasound-guided core biopsy of a pathologic left inguinal lymph node. Thank you for the opportunity to assist in the care of your patient. Electronically Signed: Daniel Hiltonqi  3/7/2023 5:10 PM EST  Workstation ID: [id number]    XR Abdomen KUB    Result Date: 3/6/2023  XR ABDOMEN KUB Date of Exam: 3/6/2023 3:50 PM EST Indication: abdominal bloating pain Comparison: CT abdomen and pelvis 3/5/2023 Findings: There is mild gaseous distention of small bowel and colon. No abnormal calcifications are seen. Included lung bases are grossly clear.     Impression: Impression: Mild gaseous distention of small bowel and colon in a pattern suggestive of ileus. Electronically Signed: Dee Louis  3/6/2023 4:51 PM EST  Workstation ID: [id number]      Results for orders placed during the hospital encounter of 03/05/23    Adult Transthoracic Echo Complete W/ Cont if Necessary Per Protocol    Interpretation Summary  •  Left ventricular systolic function is hyperdynamic (EF > 70%). Calculated left ventricular EF = 74%  •  Left ventricular diastolic function is consistent with (grade I) impaired relaxation.  •  There is a small (<1cm) pericardial effusion.      Current medications:  Scheduled Meds:amLODIPine, 10 mg, Oral, Q24H  aspirin, 81 mg, Oral, Daily  atorvastatin, 80 mg, Oral, Nightly  empagliflozin, 10 mg, Oral, Daily  hydrALAZINE, 50 mg, Oral, BID  insulin lispro, 0-9 Units, Subcutaneous, TID AC  ipratropium-albuterol, 3 mL, Nebulization, Q6H While Awake - RT  metoclopramide, 5 mg, Oral, TID AC  metoprolol succinate XL, 100 mg, Oral, BID  pharmacy consult - MTM, , Does not apply, BID  sodium chloride, 10 mL, Intravenous, Q12H  terazosin, 5 mg, Oral, Q12H      Continuous Infusions:   PRN Meds:.•  acetaminophen  •  dextrose  •  dextrose  •  glucagon (human recombinant)  •  melatonin  •  ondansetron **OR** ondansetron  •  sodium  chloride  •  sodium chloride  •  sodium chloride    Assessment & Plan   Assessment & Plan     Active Hospital Problems    Diagnosis  POA   • **Acute on chronic congestive heart failure, unspecified heart failure type ([facility]) [I50.9]  Yes   • Thrombocytopenia ([facility]) [D69.6]  Yes   • CKD (chronic kidney disease) stage 3, GFR 30-59 ml/min ([facility]) [N18.30]  Yes   • Essential hypertension [I10]  Yes   • JONAH (obstructive sleep apnea) [G47.33]  Yes   • Rheumatoid arthritis (HCC) [M06.9]  Yes   • COPD (chronic obstructive pulmonary disease) ([facility]) [J44.9]  Yes   • Type 2 diabetes mellitus ([facility]) [E11.9]  Yes   • Inguinal lymphadenopathy [R59.0]  Yes   • Anemia [D64.9]  Yes      Resolved Hospital Problems   No resolved problems to display.        Brief Hospital Course to date:  Miguel Camejo is a 78 y.o. male with history of rheumatoid arthritis, hypertension, hyperlipidemia, diabetes, sleep apnea, obesity, pancytopenia, GERD who presented to the ER with worsening dyspnea and bilateral lower extremity edema.   He reports dyspnea on exertion.   He sleeps in a recliner most of the time...    Dyspnea on exertion  Lower extremity swelling  -Concern would be for right heart dysfunction from sleep apnea  -Echocardiogram  -COPD/emphysema -inhalers for his shortness of breath over the past several days have not improved his symptoms  -Possible mild volume - possible mild CHF but has normal EF  Acute on chronic heart failure with preserved ejection fraction  -Bumex given this afternoon  -Wean oxygen as able  -Holding ARB pending renal function  Concern for ileus  -Trial of Reglan  -seen by Surgery  - not improving - will ask for help from GI  Gallbladder distention and thickening  -Ultrasound gallbladder  -GI symptoms after eating including bloating and fullness  - consultation GI  Pericardial effusion  -Small pericardial effusion on echo  Sleep apnea  - Order CPAP machine for sleep and as needed  - sleeps in recliner most of the  time  Troponin leak  -Has a flat troponin trend  Obesity  -BMI greater than 30  Hypertension  -Antihypertensives as tolerated  Diabetes  -Hemoglobin A1c appears to be tightly controlled  Iron deficiency anemia  -He appears to be on aspirin and ascorbic acid  Inguinal lymphadenopathy  -IR guided biopsy - successful Left Inguinal LN biopsy - path pending    Biliary Dyskinesia and Abdominal Bloating  GI consultation    Expected Discharge Location and Transportation: Home  Expected Discharge   Expected Discharge Date and Time     Expected Discharge Date Expected Discharge Time    Mar 10, 2023            DVT prophylaxis:  Mechanical DVT prophylaxis orders are present.     AM-PAC 6 Clicks Score (PT): 22 (03/07/23 1011)    CODE STATUS:   Code Status and Medical Interventions:   Ordered at: 03/05/23 2307     Code Status (Patient has no pulse and is not breathing):    CPR (Attempt to Resuscitate)     Medical Interventions (Patient has pulse or is breathing):    Full Support       Paul Castro MD  03/07/23

## 2023-03-07 NOTE — PROGRESS NOTES
HEMATOLOGY/ONCOLOGY PROGRESS NOTE    Subjective      CC: Breathing is better    SUBJECTIVE: Resting in bedside.  Had biopsy of left inguinal lymph node this morning.  Tolerated procedure well.  States breathing has improved and feels it is at his baseline.  Still having abdominal distention and fullness.  Denies nausea or vomiting.  Scheduled for HIDA scan today.      Past Medical History, Past Surgical History, Social History, Family History have been reviewed and are without significant changes except as mentioned.    Medications:  The current medication list was reviewed in the EMR    ALLERGIES:   Allergies   Allergen Reactions   • Lisinopril Swelling   • Benazepril Swelling     ROS:  A comprehensive 14 point review of systems was performed and was negative except as mentioned.    Objective      Vitals:    03/07/23 0737 03/07/23 0826 03/07/23 0844 03/07/23 1011   BP:  144/69 141/69 127/74   BP Location:       Patient Position:       Pulse:  62 65    Resp: 18 18 18    Temp:       TempSrc:       SpO2:  100% 98%    Weight:       Height:         General: well appearing, in no acute distress  HEENT: sclerae anicteric, oropharynx clear  Cardiovascular: regular rate and rhythm, no murmurs  Lungs: clear to auscultation bilaterally, respirations even and unlabored on 2 liters NC  Abdomen: soft, nontender, nondistended.  No palpable organomegaly  Extremities: no lower extremity edema  Skin: no rashes, lesions, bruising, or petechiae  Neuro: Alert and oriented x 3. Moves all extremities.    RECENT LABS:    Results from last 7 days   Lab Units 03/07/23  0619 03/06/23 0727 03/05/23 1917   WBC 10*3/mm3 4.13 3.04* 3.85   HEMOGLOBIN g/dL 8.4* 9.6* 9.5*   PLATELETS 10*3/mm3 142 146 124*     Results from last 7 days   Lab Units 03/07/23  0619 03/06/23 0727 03/05/23 1917   SODIUM mmol/L 143 143 145   POTASSIUM mmol/L 4.9 5.1 4.6   CO2 mmol/L 28.0 25.0 27.0   BUN mg/dL 37* 32* 26*   CREATININE mg/dL 1.62* 1.69* 1.49*   GLUCOSE  mg/dL 107* 121* 104*     Results from last 7 days   Lab Units 03/07/23  0619 03/05/23  1917   AST (SGOT) U/L 16 19   ALT (SGPT) U/L 13 15   BILIRUBIN mg/dL 0.4 0.5   ALK PHOS U/L 63 80     Results from last 7 days   Lab Units 03/05/23 2039   PH, ARTERIAL pH units 7.315*   PCO2, ARTERIAL mm Hg 53.2*   PO2 ART mm Hg 74.8*     CT Abdomen Pelvis With Contrast    Result Date: 3/5/2023  1.  Moderate CHF/volume overload including small bilateral pleural effusions, small pericardial effusion, and pulmonary interstitial edema. In this setting the minor patchy groundglass pulmonary opacities likely represent alveolar edema. Recommend follow-up chest CT in six months to ensure resolution. 2.  Gallbladder luminal distention and suspected focal wall thickening. Recommend further evaluation with ultrasound or HIDA for acute cholecystitis. 3.  Enlarged bilateral inguinal lymph nodes, favored to be reactive given morphology. Correlate for lower extremity infection. 4.  No evidence of pulmonary embolus. 5.  Chronic findings as above. Electronically signed by:  Dario Voss M.D.  3/5/2023 7:36 PM Mountain Time    US Gallbladder    Result Date: 3/6/2023  Impression: 1. Gallbladder appears within normal limits. No gall but are well thickening or pericholecystic fluid. No gallstones identified. No biliary tract obstruction. 2. Small right pleural effusion 3. Multiple hepatic cyst Electronically Signed: Sunil Block  3/6/2023 9:28 AM EST  Workstation ID: [id number]    XR Chest 1 View    Result Date: 3/5/2023  Impression: Multifocal airspace opacity right lung worse than left with cardiomegaly. Multifocal pneumonia is favored over pulmonary edema. Electronically Signed: Francoise Hightower  3/5/2023 7:47 PM EST  Workstation ID: [id number]    CT Angiogram Chest Pulmonary Embolism    Result Date: 3/5/2023  1.  Moderate CHF/volume overload including small bilateral pleural effusions, small pericardial effusion, and pulmonary interstitial edema. In  this setting the minor patchy groundglass pulmonary opacities likely represent alveolar edema. Recommend follow-up chest CT in six months to ensure resolution. 2.  Gallbladder luminal distention and suspected focal wall thickening. Recommend further evaluation with ultrasound or HIDA for acute cholecystitis. 3.  Enlarged bilateral inguinal lymph nodes, favored to be reactive given morphology. Correlate for lower extremity infection. 4.  No evidence of pulmonary embolus. 5.  Chronic findings as above. Electronically signed by:  Dario Voss M.D.  3/5/2023 7:36 PM Mountain Time    XR Abdomen KUB    Result Date: 3/6/2023  Impression: Mild gaseous distention of small bowel and colon in a pattern suggestive of ileus. Electronically Signed: Dee Louis  3/6/2023 4:51 PM EST  Workstation ID: [id number]      Assessment   ASSESSMENT & PLAN:  1.  Pancytopenia with combination of anemia renal disease, possible sequestration with the hepatomegaly/portal vein dilation/SMV dilation on February 2020 liver spleen ultrasound suggesting portal hypertension and possible contribution from the gastric vascular ectasias on EGD  elevation of serum kappa and lambda light chains and gastric angioectasias on 7/17/2020 EGD  2.  Chronic low back pain   3.  Chronic kidney disease  4.  Type 2 diabetes  5.  Hyperlipidemia  6.  Hypertension  7.  Hypogonadism  8.  Depression  9.  COPD  10.  Obstructive sleep apnea  11.  Reflux   12.  Gout  13.  osteoarthritis  14.  BPH with ED  15.  BMI 36  16.  Putative history of rheumatoid arthritis  17.  Chronic granulomatous lung nodules on CT chest screening with routine follow-up recommended  18.  Scant elevation of serum light chains without intact monoclonal gammopathy and no plasma cell dyscrasia on bone marrow biopsy August 2020      -3/6/2023 [facility] inpatient hematology oncology consult: Since last I saw the patient and before he could get back to Dr. Perez for further management of his pancytopenia  and gastric antral vascular ectasia, he was admitted with increasing shortness of breath and cough with increasing abdominal girth and pulmonary edema.  On evaluation of this inguinal nodes were found.  I was asked to consult regarding this.  Ultimately need to know what these nodes are and whether the balanced elevation of his light chains could be related to underlying lymphoma which is a possibility albeit unlikely, ultimately we need tissue.  I have spoken with Dr. Coleman who understandably in the midst of his cardiac issues is hesitant to try to excavate an inguinal node surgically.  We will first try ultrasound-guided lymph node biopsy and see if that gives us any definitive answers.  My nurse practitioner will be covering for the next couple of days and I will check back Thursday.    -3/7/2023 [facility] [place] hematology oncology inpatient follow-up:  Patient had biopsy of left inguinal lymph node this morning and tolerated it well.  We will follow up on results when available.  If results are not definitive then may consider excisional biopsy in the future when respiratory and heart failure symptoms have improved.  Cardiology following.  KUB suggest possible ileus.  Gallbladder US showed gallbladder within normal limits.  Continues to have abdominal distention with fullness.  Plan for HIDA scan today.        Trinh Kinney, APRN    3/7/2023

## 2023-03-07 NOTE — CASE MANAGEMENT/SOCIAL WORK
Continued Stay Note  [facility]     Patient Name: Miguel Camejo  MRN: [id number]  Today's Date: 3/7/2023    Admit Date: 3/5/2023    Plan: update   Discharge Plan     Row Name 03/07/23 1437       Plan    Plan update    Patient/Family in Agreement with Plan yes    Plan Comments Per GARO, ocrnelio to have a HIDA Scan.  Spoke with patient spouse at bedside regarding discharge plan, patient off of nursing unit for testing.  No new discharge needs verbalized.  Received a call from [facility] who advises they have gotten a referral from patient PCP and have spoken with patient wife who is agreeable to HH at discharge.  CM following.  Patient plan is to discharge home with [facility] via car with family to transport.    Final Discharge Disposition Code 06 - home with home health care               Discharge Codes    No documentation.               Expected Discharge Date and Time     Expected Discharge Date Expected Discharge Time    Mar 10, 2023             Yamilet Hou RN

## 2023-03-07 NOTE — PRE-SEDATION DOCUMENTATION
Orthopedic Spine Progress Note Post Op day: 1 Day Post-Op 2018 8:16 AM  
Admit Date: 2018 Procedure: Procedure(s): REVISION LAMINECTOMY L4-5 AND REVISION FUSION L4-S1 Subjective:  
 
Rudean Simmonds appears well. Pain seems to be managed. Afebrile/VSS No N/V 
Voiding Drain in place Pain Control:  
Pain Assessment Pain Scale 1: Numeric (0 - 10) Pain Intensity 1: 9 Pain Location 1: Back Pain Orientation 1: Lower, Mid 
Pain Description 1: Aching, Sore Pain Intervention(s) 1: Medication (see MAR) Objective:  
 
 
  
Physical Exam: 
General:  Alert and oriented. No acute distress. Heart:  Respirations unlabored. Abdomen:  
Extremities: Soft, non-tender. No evidence of cyanosis. Pulses palpable in both upper and lower extremities. Neurologic: 
Musculoskeletal:  No new motor deficits. Neurovascular exam within normal limits. Sensation stable. Motor: unchanged C5-T1 and L2-S1. Margoth's sign negative in bilateral lower extremities. Calves soft, nontender upon palpation and with passive twitch. Moves both upper and lower extremities. Incision: clean, dry, and intact. No significant erythema or swelling. No active drainage noted. Vital Signs:   
Blood pressure 136/78, pulse 64, temperature 98.1 °F (36.7 °C), resp. rate 18, height 5' 7\" (1.702 m), weight 117.4 kg (258 lb 13.1 oz), last menstrual period 2009, SpO2 94 %. Temp (24hrs), Av °F (36.7 °C), Min:97.9 °F (36.6 °C), Max:98.4 °F (36.9 °C) LAB:   
Recent Labs 18 
 5016 HGB  10.9* Lab Results Component Value Date/Time Sodium 139 2018 03:29 AM  
 Potassium 4.2 2018 03:29 AM  
 Chloride 106 2018 03:29 AM  
 CO2 23 2018 03:29 AM  
 Glucose 148 (H) 2018 03:29 AM  
 BUN 11 2018 03:29 AM  
 Creatinine 0.91 2018 03:29 AM  
 Calcium 8.4 (L) 2018 03:29 AM  
 
 
Intake/Output:  
 1901 -  [address]00 [facility]   Vascular Interventional Radiology  History & Physicial    Pertinent information including components of history, physical examination, review of systems, lab and imaging data, and impression and plan from this source was also reviewed for the creation of the following pre-procedural note: Progress Notes by Paul Castro MD (2023 17:20)         Patient Name:Miguel Camejo    : 1945    MRN: [id number]    Primary Care Physician: Cuong Hairston MD    Referring Physician: No ref. provider found     Date of admission: 3/5/2023    Subjective     Reason for Consult: Inguinal LN biopsy    History of Present Illness     Miguel Camejo is a 78 y.o. male referred to IR as noted above.      Active Hospital Problems:  Active Hospital Problems    Diagnosis    • **Acute on chronic congestive heart failure, unspecified heart failure type (HCC)    • Thrombocytopenia (HCC)    • CKD (chronic kidney disease) stage 3, GFR 30-59 ml/min (HCC)    • Essential hypertension    • JONAH (obstructive sleep apnea)    • Rheumatoid arthritis (HCC)    • COPD (chronic obstructive pulmonary disease) ([facility])    • Type 2 diabetes mellitus (HCC)    • Inguinal lymphadenopathy    • Anemia        Personal History     Past Medical History:   Diagnosis Date   • Arthritis    • Asthma    • Bowel trouble    • CHF (congestive heart failure) (HCC)    • Chondrocalcinosis of right knee    • Colitis    • COPD (chronic obstructive pulmonary disease) (HCC)    • Diabetes mellitus (HCC)    • Esophagitis    • Gout    • H/O bladder problems    • Heart murmur    • Hypertension    • IBS (irritable bowel syndrome)    • JONAH on CPAP    • Primary osteoarthritis of both knees    • Rheumatoid arthritis (HCC)    • Skin problem    • SOB (shortness of breath)        Past Surgical History:   Procedure Laterality Date   • COLONOSCOPY     • KNEE ARTHROSCOPY Left    • PARTIAL KNEE ARTHROPLASTY Left    • SKIN BIOPSY     • STOMACH SURGERY     • UPPER  "GASTROINTESTINAL ENDOSCOPY     • VASECTOMY         Family History: His family history includes Cancer in his mother and another family member; Heart attack in his father and another family member; Heart disease in his father and another family member; Hypertension in his father, mother, and another family member; Osteoarthritis in his mother; Rheum arthritis in his mother; Stroke in an other family member.     Social History: He  reports that he has quit smoking. His smoking use included cigarettes. He has a 72.00 pack-year smoking history. He has never used smokeless tobacco. He reports current alcohol use. He reports that he does not use drugs.    Home Medications:  Baclofen, Fluticasone Furoate-Vilanterol, Probiotic Product, albuterol sulfate HFA, allopurinol, amLODIPine, aspirin, atorvastatin, cetirizine, colchicine, doxazosin, empagliflozin, famotidine, ferrous sulfate, finasteride, folic acid, furosemide, hydrALAZINE, losartan, methylcellulose (Laxative), metoprolol succinate XL, minoxidil, omeprazole, oxybutynin, potassium chloride, tiotropium, and vitamin C    Current Medications:  •  acetaminophen  •  amLODIPine  •  aspirin  •  atorvastatin  •  dextrose  •  dextrose  •  empagliflozin  •  glucagon (human recombinant)  •  hydrALAZINE  •  insulin lispro  •  ipratropium-albuterol  •  melatonin  •  metoclopramide  •  metoprolol succinate XL  •  ondansetron **OR** ondansetron  •  pharmacy consult - MTM  •  sodium chloride  •  sodium chloride  •  sodium chloride  •  sodium chloride  •  terazosin     Allergies:  He is allergic to lisinopril and benazepril.    Review of Systems    IR Procedure pertinent significant findings are mentioned in the PMH and PSH above.    Objective     Visit Vitals  /61 (BP Location: Left arm, Patient Position: Lying)   Pulse 60   Temp 98.1 °F (36.7 °C) (Oral)   Resp 18   Ht 172.7 cm (67.99\")   Wt 108 kg (238 lb 15.7 oz)   SpO2 96%   BMI 36.35 kg/m²        Physical Exam    A&Ox3. " In: 1686.7 [P.O.:120; I.V.:1566.7] Out: 5321 [Urine:1100; Drains:50] Assessment:  
Patient is 1 Day Post-Op s/p Procedure(s): REVISION LAMINECTOMY L4-5 AND REVISION FUSION L4-S1 Obese - BMI 40.4 (258lbs, 5'7\") Plan: 1. PT/OT 2.  D/C PCA and begin established methods of pain control. Send home with oxycodone, not dilaudid 3. VTE Prophylaxes - TEDS & SCDs 4. Encouraged use of ICS 5. Remove drain 6. Discharge to home Saturday or Sunday pending clearance by PT Signed By: Tracey Tolbert PA-C 
   Able to communicate  No Apparent Distress  Average physique   CVS: Regular rate and rhythm  Respiratory: Non labored breathing. No signs of respiratory compromise.    Result Review      I have personally reviewed the results from the time of this admission to 3/7/2023 08:20 EST and agree with these findings.  [x]  Laboratory  []  Microbiology  [x]  Radiology  []  EKG/Telemetry   []  Cardiology/Vascular   []  Pathology  []  Old records  []  Other:    Most notable findings include: As noted:    Results from last 7 days   Lab Units 03/07/23  0619 03/06/23  0727 03/05/23  1917   HEMOGLOBIN g/dL 8.4* 9.6* 9.5*   HEMATOCRIT % 25.5* 29.8* 29.4*   PLATELETS 10*3/mm3 142 146 124*       Estimated Creatinine Clearance: 44.8 mL/min (A) (by C-G formula based on SCr of 1.62 mg/dL (H)).   Creatinine   Date Value Ref Range Status   03/07/2023 1.62 (H) 0.76 - 1.27 mg/dL Final   03/06/2023 1.69 (H) 0.76 - 1.27 mg/dL Final   03/05/2023 1.49 (H) 0.76 - 1.27 mg/dL Final       COVID19   Date Value Ref Range Status   03/05/2023 Not Detected Not Detected - Ref. Range Final        No results found for: PREGTESTUR, PREGSERUM, HCG, HCGQUANT     ASA SCALE ASSESSMENT (applicable ONLY if sedation planned):        MALLAMPATI CLASSIFICATION (applicable ONLY if sedation planned):       Assessment / Plan     Miguel Camejo is a 78 y.o. male referred to the IR service with above problem.    Plan:   As above.    Notice: The note was created before the performance of the procedure. It might have been left in the pending status and signed off after the procedure. The time stamp on the note may be misleading.    Daniel Gallagher MD   Vascular Interventional Radiology  03/07/23   8:20 AM EST

## 2023-03-07 NOTE — NURSING NOTE
Image guided left inguinal lymph node biopsy performed by Dr Gallagher. 2 samples obtained and sent to the lab for testing. Patient tolerated well. Report called to nurse.

## 2023-03-07 NOTE — PLAN OF CARE
Goal Outcome Evaluation:   Pt a/o x4, vs stable, and on 2L NC. Pt ambulated 300 feet in the jung. Pt resting in bed, call light within reach.

## 2023-03-08 ENCOUNTER — READMISSION MANAGEMENT (OUTPATIENT)
Dept: CALL CENTER | Facility: HOSPITAL | Age: 78
End: 2023-03-08
Payer: MEDICARE

## 2023-03-08 VITALS
SYSTOLIC BLOOD PRESSURE: 134 MMHG | DIASTOLIC BLOOD PRESSURE: 78 MMHG | RESPIRATION RATE: 20 BRPM | WEIGHT: 242.1 LBS | OXYGEN SATURATION: 95 % | HEIGHT: 68 IN | HEART RATE: 67 BPM | BODY MASS INDEX: 36.69 KG/M2 | TEMPERATURE: 98 F

## 2023-03-08 LAB
ANION GAP SERPL CALCULATED.3IONS-SCNC: 7 MMOL/L (ref 5–15)
BUN SERPL-MCNC: 26 MG/DL (ref 8–23)
BUN/CREAT SERPL: 22 (ref 7–25)
CALCIUM SPEC-SCNC: 8.7 MG/DL (ref 8.6–10.5)
CHLORIDE SERPL-SCNC: 107 MMOL/L (ref 98–107)
CO2 SERPL-SCNC: 29 MMOL/L (ref 22–29)
CREAT SERPL-MCNC: 1.18 MG/DL (ref 0.76–1.27)
CYTO UR: NORMAL
DEPRECATED RDW RBC AUTO: 45.3 FL (ref 37–54)
EGFRCR SERPLBLD CKD-EPI 2021: 63.2 ML/MIN/1.73
ERYTHROCYTE [DISTWIDTH] IN BLOOD BY AUTOMATED COUNT: 13.4 % (ref 12.3–15.4)
GLUCOSE BLDC GLUCOMTR-MCNC: 104 MG/DL (ref 70–130)
GLUCOSE BLDC GLUCOMTR-MCNC: 158 MG/DL (ref 70–130)
GLUCOSE BLDC GLUCOMTR-MCNC: 93 MG/DL (ref 70–130)
GLUCOSE SERPL-MCNC: 79 MG/DL (ref 65–99)
HCT VFR BLD AUTO: 29.3 % (ref 37.5–51)
HGB BLD-MCNC: 9.5 G/DL (ref 13–17.7)
LAB AP CASE REPORT: NORMAL
LAB AP CLINICAL INFORMATION: NORMAL
MCH RBC QN AUTO: 30 PG (ref 26.6–33)
MCHC RBC AUTO-ENTMCNC: 32.4 G/DL (ref 31.5–35.7)
MCV RBC AUTO: 92.4 FL (ref 79–97)
PATH REPORT.FINAL DX SPEC: NORMAL
PATH REPORT.GROSS SPEC: NORMAL
PLATELET # BLD AUTO: 126 10*3/MM3 (ref 140–450)
PMV BLD AUTO: 10.4 FL (ref 6–12)
POTASSIUM SERPL-SCNC: 4.5 MMOL/L (ref 3.5–5.2)
RBC # BLD AUTO: 3.17 10*6/MM3 (ref 4.14–5.8)
SODIUM SERPL-SCNC: 143 MMOL/L (ref 136–145)
WBC NRBC COR # BLD: 3.95 10*3/MM3 (ref 3.4–10.8)

## 2023-03-08 PROCEDURE — 99221 1ST HOSP IP/OBS SF/LOW 40: CPT | Performed by: PHYSICIAN ASSISTANT

## 2023-03-08 PROCEDURE — 99232 SBSQ HOSP IP/OBS MODERATE 35: CPT | Performed by: NURSE PRACTITIONER

## 2023-03-08 PROCEDURE — 63710000001 RIFAXIMIN 550 MG TABLET: Performed by: PHYSICIAN ASSISTANT

## 2023-03-08 PROCEDURE — 94761 N-INVAS EAR/PLS OXIMETRY MLT: CPT

## 2023-03-08 PROCEDURE — 94799 UNLISTED PULMONARY SVC/PX: CPT

## 2023-03-08 PROCEDURE — A9270 NON-COVERED ITEM OR SERVICE: HCPCS | Performed by: PHYSICIAN ASSISTANT

## 2023-03-08 PROCEDURE — 63710000001 METOCLOPRAMIDE 10 MG TABLET: Performed by: HOSPITALIST

## 2023-03-08 PROCEDURE — 99232 SBSQ HOSP IP/OBS MODERATE 35: CPT | Performed by: INTERNAL MEDICINE

## 2023-03-08 PROCEDURE — 99239 HOSP IP/OBS DSCHRG MGMT >30: CPT | Performed by: HOSPITALIST

## 2023-03-08 PROCEDURE — 63710000001 INSULIN LISPRO (HUMAN) PER 5 UNITS: Performed by: PHYSICIAN ASSISTANT

## 2023-03-08 PROCEDURE — A9270 NON-COVERED ITEM OR SERVICE: HCPCS | Performed by: HOSPITALIST

## 2023-03-08 PROCEDURE — 82962 GLUCOSE BLOOD TEST: CPT

## 2023-03-08 PROCEDURE — 94664 DEMO&/EVAL PT USE INHALER: CPT

## 2023-03-08 PROCEDURE — 85027 COMPLETE CBC AUTOMATED: CPT | Performed by: HOSPITALIST

## 2023-03-08 PROCEDURE — 80048 BASIC METABOLIC PNL TOTAL CA: CPT | Performed by: HOSPITALIST

## 2023-03-08 PROCEDURE — G0378 HOSPITAL OBSERVATION PER HR: HCPCS

## 2023-03-08 RX ORDER — TERAZOSIN 5 MG/1
5 CAPSULE ORAL EVERY 12 HOURS SCHEDULED
Qty: 60 CAPSULE | Refills: 0 | Status: SHIPPED | OUTPATIENT
Start: 2023-03-08 | End: 2023-04-07

## 2023-03-08 RX ORDER — FUROSEMIDE 40 MG/1
20 TABLET ORAL DAILY
Qty: 15 TABLET | Refills: 0 | Status: SHIPPED | OUTPATIENT
Start: 2023-03-09 | End: 2023-04-08

## 2023-03-08 RX ORDER — ALLOPURINOL 100 MG/1
50 TABLET ORAL DAILY
Qty: 15 TABLET | Refills: 0 | Status: SHIPPED | OUTPATIENT
Start: 2023-03-08 | End: 2023-04-07

## 2023-03-08 RX ORDER — DOCUSATE SODIUM 100 MG/1
100 CAPSULE, LIQUID FILLED ORAL 2 TIMES DAILY
Status: DISCONTINUED | OUTPATIENT
Start: 2023-03-08 | End: 2023-03-08 | Stop reason: HOSPADM

## 2023-03-08 RX ORDER — POLYETHYLENE GLYCOL 3350 17 G/17G
17 POWDER, FOR SOLUTION ORAL 2 TIMES DAILY
Status: DISCONTINUED | OUTPATIENT
Start: 2023-03-08 | End: 2023-03-08 | Stop reason: HOSPADM

## 2023-03-08 RX ORDER — ATORVASTATIN CALCIUM 80 MG/1
80 TABLET, FILM COATED ORAL NIGHTLY
Qty: 30 TABLET | Refills: 0 | Status: SHIPPED | OUTPATIENT
Start: 2023-03-08 | End: 2023-04-07

## 2023-03-08 RX ORDER — AMLODIPINE BESYLATE 10 MG/1
10 TABLET ORAL
Qty: 30 TABLET | Refills: 0 | Status: SHIPPED | OUTPATIENT
Start: 2023-03-09 | End: 2023-04-08

## 2023-03-08 RX ADMIN — FUROSEMIDE 40 MG: 40 TABLET ORAL at 08:20

## 2023-03-08 RX ADMIN — HYDRALAZINE HYDROCHLORIDE 50 MG: 50 TABLET, FILM COATED ORAL at 08:19

## 2023-03-08 RX ADMIN — INSULIN LISPRO 2 UNITS: 100 INJECTION, SOLUTION INTRAVENOUS; SUBCUTANEOUS at 12:15

## 2023-03-08 RX ADMIN — METOCLOPRAMIDE 5 MG: 10 TABLET ORAL at 12:15

## 2023-03-08 RX ADMIN — ASPIRIN 81 MG: 81 TABLET, COATED ORAL at 08:20

## 2023-03-08 RX ADMIN — AMLODIPINE BESYLATE 10 MG: 10 TABLET ORAL at 08:20

## 2023-03-08 RX ADMIN — TERAZOSIN HYDROCHLORIDE 5 MG: 5 CAPSULE ORAL at 08:20

## 2023-03-08 RX ADMIN — RIFAXIMIN 550 MG: 550 TABLET ORAL at 14:26

## 2023-03-08 RX ADMIN — Medication 10 ML: at 08:23

## 2023-03-08 RX ADMIN — EMPAGLIFLOZIN 10 MG: 10 TABLET, FILM COATED ORAL at 08:20

## 2023-03-08 RX ADMIN — METOPROLOL SUCCINATE 100 MG: 50 TABLET, EXTENDED RELEASE ORAL at 08:19

## 2023-03-08 RX ADMIN — IPRATROPIUM BROMIDE AND ALBUTEROL SULFATE 3 ML: 2.5; .5 SOLUTION RESPIRATORY (INHALATION) at 07:40

## 2023-03-08 RX ADMIN — METOCLOPRAMIDE 5 MG: 10 TABLET ORAL at 08:20

## 2023-03-08 RX ADMIN — IPRATROPIUM BROMIDE AND ALBUTEROL SULFATE 3 ML: 2.5; .5 SOLUTION RESPIRATORY (INHALATION) at 12:51

## 2023-03-08 NOTE — PROGRESS NOTES
"Patient Name:  Miguel Camejo  YOB: 1945  [id number]    Surgery Progress Note    Date of visit: 3/8/2023      Subjective:     No acute events.  He underwent core needle biopsy yesterday with interventional radiology and tolerated this well.     I was asked to evaluate him on Monday for excisional lymph node biopsy.  It is documented in the chart that I evaluated him for ileus and gallbladder disease, however the patient described no abdominal symptoms to me on Monday and this was not communicated to me by the primary team so this is the first time evaluating him for this.  He reports that he has had 2 to 3 weeks of some abdominal bloating and fullness.  He reports that this is worse after he eats.  He has continued to tolerate a diet without difficulty.  He has not had any nausea or vomiting.  He specifically denies any acute abdominal pain other than some soreness and bloating.  He reports that he has had bowel movements without difficulty, although has not had one since admission.  He has continued to pass flatus regularly over the last 2 days.      Objective:     /69 (BP Location: Left arm, Patient Position: Lying)   Pulse 61   Temp 98 °F (36.7 °C) (Oral)   Resp 18   Ht 172.7 cm (67.99\")   Wt 110 kg (242 lb 1.6 oz)   SpO2 96%   BMI 36.82 kg/m²     Intake/Output Summary (Last 24 hours) at 3/8/2023 0746  Last data filed at 3/8/2023 0721  Gross per 24 hour   Intake --   Output 3150 ml   Net -3150 ml       GEN:   Awake, alert, sitting up in bed, no obvious distress, chronically ill-appearing  CV:   Regular rate and rhythm  L:  Symmetric expansion, not labored on oxygen by nasal cannula  Abd:  Obese, soft, no focal areas of tenderness to palpation, not obviously distended  Ext:  No cyanosis, clubbing, or edema    Recent labs that are back at this time have been reviewed.           Assessment/ Plan:    Mr. Camejo is a 78-year-old gentleman with history of congestive heart failure, " diabetes, COPD, and pancytopenia who was admitted to [facility] with complaints of shortness of air and is being treated for respiratory failure and congestive heart failure who was incidentally noted to have evidence of inguinal adenopathy     #Inguinal adenopathy  -Underwent core biopsy with IR yesterday    #Abdominal bloating   -It is documented in the chart that I evaluated him for ileus and gallbladder disease, however the patient described no abdominal symptoms to me on Monday and this was not communicated to me by the primary team so this is my first time evaluating him for this  -I have personally reviewed his CT scan from admission and I do not appreciate any evidence of significant gallbladder wall thickening, distention, or pericholecystic stranding.  His right upper quadrant ultrasound was without acute findings.  He had a HIDA scan performed yesterday and this visualized the gallbladder which rules out acute cholecystitis.  Ejection fraction was 19%  -He has tolerated a regular diet well over the last 2 days with no nausea, vomiting, or abdominal pain.  He is passing flatus reliably.  -The clinical history that he provides does not appear consistent with gallbladder disease.   -I do not recommend cholecystectomy for this gentleman who was admitted with dyspnea, respiratory failure, and acute on chronic heart failure     Mikel Coleman MD  3/8/2023  07:46 EST

## 2023-03-08 NOTE — CONSULTS
[facility] [place] [facility] Consult    Referring Provider: Paul Castro MD  PCP: Cuong Hairston MD    Reason for Consultation: Abdominal bloating     Chief complaint: Shortness of breath and lower extremity edema     History of present illness:    Miguel Camejo is a 78 y.o. male who is admitted with CHF exacerbation.  He complained of lower extremity edema, weight gain and dyspnea.    He has been evaluated by cardiology and diuresed with IV Bumetanide.   CT Abdomen/Pelvis on arrival performed showed enlarged bilateral inguinal lymph nodes that have since been biopsied by IR.       GI is consulted today for abdominal bloating.   The patient is currently eating chili and a baked potato for lunch during encounter.  He denies any abdominal pain at this time.  He describes a 1-2 week history of intermittent abdominal bloating.   This is typically relieved with a bowel movement.   He had a normal bowel movement this morning.    He denies nausea nor vomiting.       His CT on arrival showed trace ascites, likely cardiac etiology.   KUB obtained two days ago shows mild gaseous distention of small bowel and colon.   HIDA scan was performed which did not show chronic cholecystitis, EF was 19%.   He denies any postprandial pain.       He is followed outpatient by gastroenterologist Dr. 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  He had a normal gastric emptying study in 2021.       Allergies:  Lisinopril and Benazepril    Scheduled Meds:  amLODIPine, 10 mg, Oral, Q24H  aspirin, 81 mg, Oral, Daily  atorvastatin, 80 mg, Oral, Nightly  docusate sodium, 100 mg, Oral, BID  empagliflozin, 10 mg, Oral, Daily  furosemide, 40 mg, Oral, Daily  hydrALAZINE, 50 mg, Oral, BID  insulin lispro, 0-9 Units, Subcutaneous, TID AC  ipratropium-albuterol, 3 mL, Nebulization, Q6H While Awake - RT  metoprolol succinate XL, 100 mg, Oral, BID  pharmacy consult - MTM, , Does not apply, BID  polyethylene glycol, 17 g, Oral, BID  sodium chloride, 10 mL,  Intravenous, Q12H  terazosin, 5 mg, Oral, Q12H         Infusions:       PRN Meds:  •  acetaminophen  •  dextrose  •  dextrose  •  glucagon (human recombinant)  •  melatonin  •  ondansetron **OR** ondansetron  •  sodium chloride  •  sodium chloride  •  sodium chloride    Home Meds:  Medications Prior to Admission   Medication Sig Dispense Refill Last Dose   • allopurinol (ZYLOPRIM) 300 MG tablet Take 1 tablet by mouth Daily.   Past Week   • amLODIPine (NORVASC) 5 MG tablet Take 1 tablet by mouth Daily.   3/5/2023   • aspirin 81 MG EC tablet Take 1 tablet by mouth Daily.   3/6/2023   • atorvastatin (LIPITOR) 40 MG tablet Take 1 tablet by mouth Daily.   3/5/2023   • Baclofen 5 MG tablet Take 5 mg by mouth 3 (Three) Times a Day.   3/6/2023   • Breo Ellipta 100-25 MCG/INH inhaler Inhale 1 puff Daily.   3/6/2023   • cetirizine (zyrTEC) 10 MG tablet Take 1 tablet by mouth Daily.   3/5/2023   • doxazosin (CARDURA) 4 MG tablet Take 2 tablets by mouth Every Night.   3/5/2023   • famotidine (PEPCID) 20 MG tablet Take 1 tablet by mouth 2 (Two) Times a Day.   Past Week   • ferrous sulfate 325 (65 Fe) MG tablet Take 1 tablet by mouth Daily With Breakfast. OTC   3/5/2023   • finasteride (PROSCAR) 5 MG tablet Take 1 tablet by mouth Daily.   3/5/2023   • furosemide (LASIX) 40 MG tablet Take 1 tablet by mouth Take As Directed. 1 - 2 tablets a week   3/5/2023   • hydrALAZINE (APRESOLINE) 25 MG tablet Take 2 tablets by mouth 2 (Two) Times a Day.   3/6/2023   • Jardiance 10 MG tablet tablet Take 1 tablet by mouth Daily.   3/6/2023   • losartan (COZAAR) 100 MG tablet Take 1 tablet by mouth Daily.   3/6/2023   • metoprolol succinate XL (TOPROL-XL) 100 MG 24 hr tablet Take 1 tablet by mouth 2 (Two) Times a Day.   3/6/2023   • minoxidil (LONITEN) 10 MG tablet Take 1 tablet by mouth 4 (Four) Times a Day.   3/6/2023   • omeprazole (priLOSEC) 40 MG capsule Take 1 capsule by mouth Daily With Breakfast, Lunch & Dinner.   3/5/2023   • oxybutynin  (DITROPAN) 5 MG tablet Take 1 tablet by mouth 2 (Two) Times a Day.   Past Week   • potassium chloride (K-DUR,KLOR-CON) 20 MEQ CR tablet Take 1 tablet by mouth Daily.   3/6/2023   • tiotropium (SPIRIVA) 18 MCG per inhalation capsule Place 1 capsule into inhaler and inhale Every Morning.   3/5/2023   • vitamin C (ASCORBIC ACID) 250 MG tablet Take 1 tablet by mouth 2 (Two) Times a Day. OTC   3/5/2023   • albuterol sulfate  (90 Base) MCG/ACT inhaler Inhale 2 puffs Every 6 (Six) Hours As Needed.   Unknown   • COLCRYS 0.6 MG tablet Take 1 tablet by mouth As Needed.   Unknown   • folic acid (FOLVITE) 1 MG tablet Take 400 tablets by mouth Daily.   Unknown   • methylcellulose, Laxative, (CITRUCEL) 500 MG tablet tablet Take 1 tablet by mouth 2 (Two) Times a Day.   Unknown   • Probiotic Product (VISBIOME HIGH POTENCY PO) Take 112.5 mg by mouth 2 (Two) Times a Day. OTC   Unknown     ROS: Review of Systems   Constitutional: Positive for fatigue.   HENT: Negative.    Eyes: Negative.    Respiratory: Positive for shortness of breath.    Gastrointestinal: Positive for abdominal distention. Negative for abdominal pain, nausea and vomiting.   Endocrine: Negative.    Genitourinary: Negative.    Musculoskeletal: Negative.    Skin: Negative.    Allergic/Immunologic: Negative.    Neurological: Negative.    Hematological: Negative.    Psychiatric/Behavioral: Negative.      PAST MED HX:  Past Medical History:   Diagnosis Date   • Arthritis    • Asthma    • Bowel trouble    • CHF (congestive heart failure) ([facility])    • Chondrocalcinosis of right knee    • Colitis    • COPD (chronic obstructive pulmonary disease) ([facility])    • Diabetes mellitus ([facility])    • Esophagitis    • Gout    • H/O bladder problems    • Heart murmur    • Hypertension    • IBS (irritable bowel syndrome)    • JONAH on CPAP    • Primary osteoarthritis of both knees    • Rheumatoid arthritis ([facility])    • Skin problem    • SOB (shortness of breath)        PAST SURG HX:  Past  "Surgical History:   Procedure Laterality Date   • COLONOSCOPY     • KNEE ARTHROSCOPY Left    • PARTIAL KNEE ARTHROPLASTY Left    • SKIN BIOPSY     • STOMACH SURGERY     • UPPER GASTROINTESTINAL ENDOSCOPY     • US GUIDED LYMPH NODE BIOPSY  3/7/2023   • VASECTOMY         FAM HX:  Family History   Problem Relation Age of Onset   • Hypertension Mother    • Cancer Mother    • Rheum arthritis Mother    • Osteoarthritis Mother    • Hypertension Father    • Heart attack Father    • Heart disease Father    • Hypertension Other    • Heart attack Other    • Heart disease Other    • Cancer Other    • Stroke Other    • BRCA 1/2 Neg Hx    • Breast cancer Neg Hx    • Ovarian cancer Neg Hx      SOC HX:  Social History     Socioeconomic History   • Marital status:    Tobacco Use   • Smoking status: Former     Packs/day: 2.00     Years: 36.00     Pack years: 72.00     Types: Cigarettes   • Smokeless tobacco: Never   Vaping Use   • Vaping Use: Never used   Substance and Sexual Activity   • Alcohol use: Yes     Comment: occasional   • Drug use: No   • Sexual activity: Defer     PHYSICAL EXAM  /73 (BP Location: Left arm, Patient Position: Lying)   Pulse 65   Temp 98 °F (36.7 °C) (Oral)   Resp 20   Ht 172.7 cm (67.99\")   Wt 110 kg (242 lb 1.6 oz)   SpO2 96%   BMI 36.82 kg/m²   Wt Readings from Last 3 Encounters:   03/08/23 110 kg (242 lb 1.6 oz)   02/24/23 113 kg (249 lb)   05/02/22 111 kg (244 lb)   ,body mass index is 36.82 kg/m².  Physical Exam  Constitutional:       General: He is not in acute distress.     Comments: Sitting up on the side of the bed eating a bowl of chili and baked potato, no acute distress    HENT:      Head: Normocephalic and atraumatic.      Mouth/Throat:      Mouth: Mucous membranes are moist.   Eyes:      General: No scleral icterus.  Cardiovascular:      Rate and Rhythm: Normal rate and regular rhythm.   Pulmonary:      Effort: Pulmonary effort is normal. No respiratory distress. "   Abdominal:      General: Bowel sounds are normal. There is no distension.      Palpations: Abdomen is soft.      Tenderness: There is no abdominal tenderness. There is no guarding.   Musculoskeletal:      Right lower leg: No edema.      Left lower leg: No edema.   Skin:     General: Skin is warm and dry.   Neurological:      Mental Status: He is alert and oriented to person, place, and time.   Psychiatric:         Behavior: Behavior normal.         Thought Content: Thought content normal.       Results Review:    I reviewed the patient's new clinical results.    Lab Results   Component Value Date    WBC 3.95 03/08/2023    HGB 9.5 (L) 03/08/2023    HGB 8.4 (L) 03/07/2023    HGB 9.6 (L) 03/06/2023    HCT 29.3 (L) 03/08/2023    MCV 92.4 03/08/2023     (L) 03/08/2023     Lab Results   Component Value Date    INR 1.23 (H) 08/20/2020     Lab Results   Component Value Date    GLUCOSE 79 03/08/2023    BUN 26 (H) 03/08/2023    CREATININE 1.18 03/08/2023    EGFRIFAFRI 65 11/16/2021    BCR 22.0 03/08/2023     03/08/2023    K 4.5 03/08/2023    CO2 29.0 03/08/2023    CALCIUM 8.7 03/08/2023    PROTENTOTREF 6.4 05/02/2022    ALBUMIN 3.4 (L) 03/07/2023    ALKPHOS 63 03/07/2023    BILITOT 0.4 03/07/2023    BILIDIR <0.2 (L) 02/27/2020    ALT 13 03/07/2023    AST 16 03/07/2023     KUB reviewed from 3/6/22.   Mild gaseous distention of small and colon.       ASSESSMENTS/PLANS    1. Abdominal bloating  2. Mild ileus, resolved.     3. CHF exacerbation   4. Enlarged inguinal lymph node, biopsied.       Patient with a two week history of abdominal bloating and fullness.    Symptoms improved with a bowel movement.   KUB on 3/6/22 showed mild gaseous distention of the small bowel and colon.    This appears clinically resolved.   He has been started on TID Reglan inpatient but denies any nausea nor vomiting.  He had a normal gastric emptying study in 2021.      His CT showed trace ascites, likely cardiac etiology.    He does  have history of pancytopenia with concerns of portal hypertension though no clear liver cirrhosis on imaging.      >> Discontinue Reglan  >> Recommend 14 day course of Xifaxan 550 mg TID as his symptoms are concerning for small bowel bacterial overgrowth.       Follow up outpatient in 2 weeks with his primary gastroenterologist, Dr. Perez at [facility].      Anticipate home soon.      I discussed the patient's findings and my recommendations with patient    WILTON Blum  03/08/23  12:20 EST

## 2023-03-08 NOTE — PROGRESS NOTES
[facility] Cardiology  Inpatient Progress Note      Chief Complaint/Reason for consult:    Chief Complaint   Patient presents with   • Shortness of Breath            Subjective  Breathing and leg swelling feel much better, was able to walk in the jung today and felt good       Vital Sign Min/Max for last 24 hours  Temp  Min: 98.1 °F (36.7 °C)  Max: 98.3 °F (36.8 °C)   BP  Min: 121/61  Max: 144/69   Pulse  Min: 59  Max: 70   Resp  Min: 16  Max: 18   SpO2  Min: 91 %  Max: 100 %   Flow (L/min)  Min: 2  Max: 2      Intake/Output Summary (Last 24 hours) at 3/7/2023 2129  Last data filed at 3/7/2023 1932  Gross per 24 hour   Intake --   Output 1450 ml   Net -1450 ml           Gen: well developed, lying in bed, comfortable appearing  HEENT: MMM, sclerae anicteric, conjunctivae normal  CV: regular rate, regular rhythm, no murmurs or rubs, normal S1, S2. 2+ radial and DP pulses  Pulm: NC oxygen, normal work of breathing, no wheezes, rales, rhonchi  Abd: soft, nontender, nondistended,  positive bowel sounds  Ext: normal bulk for age, normal tone, no dependent edema  Neuro: alert, oriented, face symmetrical, moving all extremities well  Psych: normal mood, appropriate affect    Results Review (reviewed the patient's recent labs in the electronic medical record):     Radiology :    US Gallbladder    Result Date: 3/6/2023  Impression: 1. Gallbladder appears within normal limits. No gall but are well thickening or pericholecystic fluid. No gallstones identified. No biliary tract obstruction. 2. Small right pleural effusion 3. Multiple hepatic cyst Electronically Signed: Sunil Block  3/6/2023 9:28 AM EST  Workstation ID: [id number]    NM HIDA SCAN WITH PHARMACOLOGICAL INTERVENTION    Result Date: 3/7/2023  1. No scintigraphic evidence of acute or chronic cholecystitis. 2. Abnormally low gallbladder ejection fraction, 19%, which may represent changes of biliary dyskinesia. Electronically Signed: Mary Jones   3/7/2023 2:23 PM EST  Workstation ID: [id number]    US Guided Lymph Node Biopsy    Result Date: 3/7/2023  Impression:                                                              Successful ultrasound-guided core biopsy of a pathologic left inguinal lymph node. Thank you for the opportunity to assist in the care of your patient. Electronically Signed: Daniel Gallagher  3/7/2023 5:10 PM EST  Workstation ID: [id number]    XR Abdomen KUB    Result Date: 3/6/2023  Impression: Mild gaseous distention of small bowel and colon in a pattern suggestive of ileus. Electronically Signed: Dee Louis  3/6/2023 4:51 PM EST  Workstation ID: [id number]      Lab Results   Component Value Date    WBC 4.13 03/07/2023    HGB 8.4 (L) 03/07/2023    HCT 25.5 (L) 03/07/2023    MCV 91.1 03/07/2023     03/07/2023     Lab Results   Component Value Date    GLUCOSE 107 (H) 03/07/2023    CALCIUM 8.1 (L) 03/07/2023     03/07/2023    K 4.9 03/07/2023    CO2 28.0 03/07/2023     (H) 03/07/2023    BUN 37 (H) 03/07/2023    CREATININE 1.62 (H) 03/07/2023    EGFRIFAFRI 65 11/16/2021    BCR 22.8 03/07/2023    ANIONGAP 7.0 03/07/2023     Lab Results   Component Value Date    TROPONINT 48 (H) 03/06/2023    TROPONINT 49 (H) 03/06/2023    TROPONINT 53 (C) 03/05/2023      Lab Results   Component Value Date    PROBNP 2,179.0 (H) 03/05/2023     Results for orders placed during the hospital encounter of 03/05/23    Adult Transthoracic Echo Complete W/ Cont if Necessary Per Protocol    Interpretation Summary  •  Left ventricular systolic function is hyperdynamic (EF > 70%). Calculated left ventricular EF = 74%  •  Left ventricular diastolic function is consistent with (grade I) impaired relaxation.  •  There is a small (<1cm) pericardial effusion.     Assessment     Acute on chronic heart failure with preserved ejection fraction   - transition to oral diuretics   - Follow BMP, wean oxygen as able   - Continue empagliflozin     Chronic pericardial  effusion, idiopathic   - Small on echocardiogram     Troponin elevation   - No symptoms of ACS and ECG with no ischemic changes, no RWMA on TTE, suspicious for demand ischemia in the setting of heart failure as well as chronic kidney disease   - Continue home aspirin and beta-blocker, statin     Hypertension   - Home meds reordered, can consider resumption of ARB if renal function stable but willd efer until normal PO intake resumes    GURWINDER Wells MD  3/7/2023  21:29 EST

## 2023-03-08 NOTE — PROGRESS NOTES
HEMATOLOGY/ONCOLOGY PROGRESS NOTE    Subjective      CC: Feeling better    SUBJECTIVE:  Patient sitting on side of bed eating chili and baked potato.  Abdominal bloating seems to have resolved.  He denies nausea or vomiting.  Reports having a bowel movement this morning.  Breathing is stable.  No new issues or concerns reported.      Past Medical History, Past Surgical History, Social History, Family History have been reviewed and are without significant changes except as mentioned.    Medications:  The current medication list was reviewed in the EMR    ALLERGIES:   Allergies   Allergen Reactions   • Lisinopril Swelling   • Benazepril Swelling     ROS:  A comprehensive 14 point review of systems was performed and was negative except as mentioned.    Objective      Vitals:    03/08/23 1000 03/08/23 1141 03/08/23 1200 03/08/23 1251   BP:  133/73     BP Location:  Left arm     Patient Position:  Lying     Pulse: 69 65 70 77   Resp:  20     Temp:  98 °F (36.7 °C)     TempSrc:  Oral     SpO2: 93% 96% 93% 91%   Weight:       Height:         General: well appearing, in no acute distress  HEENT: sclerae anicteric, oropharynx clear  Cardiovascular: regular rate and rhythm, no murmurs  Lungs: clear to auscultation bilaterally, respirations even and unlabored on 2 liters NC  Abdomen: soft, nontender, nondistended.   Extremities: no lower extremity edema  Skin: no rashes, lesions, bruising, or petechiae  Neuro: Alert and oriented x 3. Moves all extremities.    RECENT LABS:    Results from last 7 days   Lab Units 03/08/23  0703 03/07/23  0619 03/06/23  0727   WBC 10*3/mm3 3.95 4.13 3.04*   HEMOGLOBIN g/dL 9.5* 8.4* 9.6*   PLATELETS 10*3/mm3 126* 142 146     Results from last 7 days   Lab Units 03/08/23  0703 03/07/23  0619 03/06/23  0727   SODIUM mmol/L 143 143 143   POTASSIUM mmol/L 4.5 4.9 5.1   CO2 mmol/L 29.0 28.0 25.0   BUN mg/dL 26* 37* 32*   CREATININE mg/dL 1.18 1.62* 1.69*   GLUCOSE mg/dL 79 107* 121*     Results from  last 7 days   Lab Units 03/07/23  0619 03/05/23  1917   AST (SGOT) U/L 16 19   ALT (SGPT) U/L 13 15   BILIRUBIN mg/dL 0.4 0.5   ALK PHOS U/L 63 80     Results from last 7 days   Lab Units 03/05/23 2039   PH, ARTERIAL pH units 7.315*   PCO2, ARTERIAL mm Hg 53.2*   PO2 ART mm Hg 74.8*     CT Abdomen Pelvis With Contrast    Result Date: 3/5/2023  1.  Moderate CHF/volume overload including small bilateral pleural effusions, small pericardial effusion, and pulmonary interstitial edema. In this setting the minor patchy groundglass pulmonary opacities likely represent alveolar edema. Recommend follow-up chest CT in six months to ensure resolution. 2.  Gallbladder luminal distention and suspected focal wall thickening. Recommend further evaluation with ultrasound or HIDA for acute cholecystitis. 3.  Enlarged bilateral inguinal lymph nodes, favored to be reactive given morphology. Correlate for lower extremity infection. 4.  No evidence of pulmonary embolus. 5.  Chronic findings as above. Electronically signed by:  Dario Voss M.D.  3/5/2023 7:36 PM Mountain Time    US Gallbladder    Result Date: 3/6/2023  Impression: 1. Gallbladder appears within normal limits. No gall but are well thickening or pericholecystic fluid. No gallstones identified. No biliary tract obstruction. 2. Small right pleural effusion 3. Multiple hepatic cyst Electronically Signed: Sunil Block  3/6/2023 9:28 AM EST  Workstation ID: [id number]    NM HIDA SCAN WITH PHARMACOLOGICAL INTERVENTION    Result Date: 3/7/2023  1. No scintigraphic evidence of acute or chronic cholecystitis. 2. Abnormally low gallbladder ejection fraction, 19%, which may represent changes of biliary dyskinesia. Electronically Signed: Mary Jones  3/7/2023 2:23 PM EST  Workstation ID: [id number]    XR Chest 1 View    Result Date: 3/5/2023  Impression: Multifocal airspace opacity right lung worse than left with cardiomegaly. Multifocal pneumonia is favored over pulmonary edema.  Electronically Signed: Francoise Katja  3/5/2023 7:47 PM EST  Workstation ID: [id number]    CT Angiogram Chest Pulmonary Embolism    Result Date: 3/5/2023  1.  Moderate CHF/volume overload including small bilateral pleural effusions, small pericardial effusion, and pulmonary interstitial edema. In this setting the minor patchy groundglass pulmonary opacities likely represent alveolar edema. Recommend follow-up chest CT in six months to ensure resolution. 2.  Gallbladder luminal distention and suspected focal wall thickening. Recommend further evaluation with ultrasound or HIDA for acute cholecystitis. 3.  Enlarged bilateral inguinal lymph nodes, favored to be reactive given morphology. Correlate for lower extremity infection. 4.  No evidence of pulmonary embolus. 5.  Chronic findings as above. Electronically signed by:  Dario Voss M.D.  3/5/2023 7:36 PM Mountain Time    US Guided Lymph Node Biopsy    Result Date: 3/7/2023  Impression:                                                              Successful ultrasound-guided core biopsy of a pathologic left inguinal lymph node. Thank you for the opportunity to assist in the care of your patient. Electronically Signed: Daniel Gallagher  3/7/2023 5:10 PM EST  Workstation ID: [id number]    XR Abdomen KUB    Result Date: 3/6/2023  Impression: Mild gaseous distention of small bowel and colon in a pattern suggestive of ileus. Electronically Signed: Dee Louis  3/6/2023 4:51 PM EST  Workstation ID: [id number]      Assessment   ASSESSMENT & PLAN:  1.  Pancytopenia with combination of anemia renal disease, possible sequestration with the hepatomegaly/portal vein dilation/SMV dilation on February 2020 liver spleen ultrasound suggesting portal hypertension and possible contribution from the gastric vascular ectasias on EGD  elevation of serum kappa and lambda light chains and gastric angioectasias on 7/17/2020 EGD  2.  Chronic low back pain   3.  Chronic kidney disease  4.  Type 2  diabetes  5.  Hyperlipidemia  6.  Hypertension  7.  Hypogonadism  8.  Depression  9.  COPD  10.  Obstructive sleep apnea  11.  Reflux   12.  Gout  13.  osteoarthritis  14.  BPH with ED  15.  BMI 36  16.  Putative history of rheumatoid arthritis  17.  Chronic granulomatous lung nodules on CT chest screening with routine follow-up recommended  18.  Scant elevation of serum light chains without intact monoclonal gammopathy and no plasma cell dyscrasia on bone marrow biopsy August 2020      -3/6/2023 [facility] inpatient hematology oncology consult: Since last I saw the patient and before he could get back to Dr. Perez for further management of his pancytopenia and gastric antral vascular ectasia, he was admitted with increasing shortness of breath and cough with increasing abdominal girth and pulmonary edema.  On evaluation of this inguinal nodes were found.  I was asked to consult regarding this.  Ultimately need to know what these nodes are and whether the balanced elevation of his light chains could be related to underlying lymphoma which is a possibility albeit unlikely, ultimately we need tissue.  I have spoken with Dr. Coleman who understandably in the midst of his cardiac issues is hesitant to try to excavate an inguinal node surgically.  We will first try ultrasound-guided lymph node biopsy and see if that gives us any definitive answers.  My nurse practitioner will be covering for the next couple of days and I will check back Thursday.    -3/7/2023 [facility] hematology oncology inpatient follow-up:  Patient had biopsy of left inguinal lymph node this morning and tolerated it well.  We will follow up on results when available.  If results are not definitive then may consider excisional biopsy in the future when respiratory and heart failure symptoms have improved.  Cardiology following.  KUB suggest possible ileus.  Gallbladder US showed gallbladder within normal limits.  Continues to have abdominal distention with  fullness.  Plan for HIDA scan today.      -3/8/2023 [facility] hematology oncology inpatient follow-up:   Left inguinal lymph node biopsy from yesterday pending.  We will follow up on results.  If not definite may consider excisional biopsy.  GI symptoms seem to be improved.  GI following.        Trinh Kinney, APRN    3/8/2023

## 2023-03-08 NOTE — CASE MANAGEMENT/SOCIAL WORK
Continued Stay Note  [facility]     Patient Name: Miguel Camejo  MRN: [id number]  Today's Date: 3/8/2023    Admit Date: 3/5/2023    Plan: update   Discharge Plan     Row Name 03/08/23 0944       Plan    Plan update    Patient/Family in Agreement with Plan yes    Plan Comments Spoke with patient and spouse at bedside regarding discharge plan.  Patient sitting on EOB and has eaten.  They are not sure what is next.  CM advised that [facility] has called and they had a referral from patient PCP, they were aware and verbalize agreement to HH.  No new needs verbalized.  CM following.  Patient plan is to discharge home with [facility] via car with family to transport.    Final Discharge Disposition Code 06 - home with home health care               Discharge Codes    No documentation.               Expected Discharge Date and Time     Expected Discharge Date Expected Discharge Time    Mar 10, 2023             Yamilet Hou RN

## 2023-03-08 NOTE — PLAN OF CARE
Goal Outcome Evaluation:   Patient discharged home with spouse. Back to baseline of 2 lpm NC. Able to walk 1400 ft with walker and standby assist. Follow up information provided. Discharge education complete.

## 2023-03-08 NOTE — PROGRESS NOTES
[facility] Cardiology  Inpatient Progress Note      Chief Complaint/Reason for consult:    Chief Complaint   Patient presents with   • Shortness of Breath            Subjective  Breathing well today on home rate of 2L/min, afebrile, no chest pain. Did cough up some phlegm this morning       Vital Sign Min/Max for last 24 hours  Temp  Min: 98 °F (36.7 °C)  Max: 98.3 °F (36.8 °C)   BP  Min: 124/62  Max: 144/69   Pulse  Min: 56  Max: 69   Resp  Min: 16  Max: 24   SpO2  Min: 90 %  Max: 100 %   Flow (L/min)  Min: 2  Max: 2      Intake/Output Summary (Last 24 hours) at 3/8/2023 0757  Last data filed at 3/8/2023 0721  Gross per 24 hour   Intake --   Output 3150 ml   Net -3150 ml           Gen: well developed, lying in bed, comfortable appearing  HEENT: MMM, sclerae anicteric, conjunctivae normal  CV: regular rate, regular rhythm, no murmurs or rubs, normal S1, S2. 2+ radial and DP pulses  Pulm: NC oxygen, normal work of breathing, no wheezes, rales, rhonchi  Abd: soft, nontender, nondistended,  positive bowel sounds  Ext: normal bulk for age, normal tone, no dependent edema  Neuro: alert, oriented, face symmetrical, moving all extremities well  Psych: normal mood, appropriate affect    Results Review (reviewed the patient's recent labs in the electronic medical record):     Radiology :    US Gallbladder    Result Date: 3/6/2023  Impression: 1. Gallbladder appears within normal limits. No gall but are well thickening or pericholecystic fluid. No gallstones identified. No biliary tract obstruction. 2. Small right pleural effusion 3. Multiple hepatic cyst Electronically Signed: Sunil Block  3/6/2023 9:28 AM EST  Workstation ID: [id number]    NM HIDA SCAN WITH PHARMACOLOGICAL INTERVENTION    Result Date: 3/7/2023  1. No scintigraphic evidence of acute or chronic cholecystitis. 2. Abnormally low gallbladder ejection fraction, 19%, which may represent changes of biliary dyskinesia. Electronically Signed: Mary  Robert  3/7/2023 2:23 PM EST  Workstation ID: [id number]    US Guided Lymph Node Biopsy    Result Date: 3/7/2023  Impression:                                                              Successful ultrasound-guided core biopsy of a pathologic left inguinal lymph node. Thank you for the opportunity to assist in the care of your patient. Electronically Signed: Daniel Gallagher  3/7/2023 5:10 PM EST  Workstation ID: [id number]    XR Abdomen KUB    Result Date: 3/6/2023  Impression: Mild gaseous distention of small bowel and colon in a pattern suggestive of ileus. Electronically Signed: Dee Louis  3/6/2023 4:51 PM EST  Workstation ID: [id number]      Lab Results   Component Value Date    WBC 4.13 03/07/2023    HGB 8.4 (L) 03/07/2023    HCT 25.5 (L) 03/07/2023    MCV 91.1 03/07/2023     03/07/2023     Lab Results   Component Value Date    GLUCOSE 107 (H) 03/07/2023    CALCIUM 8.1 (L) 03/07/2023     03/07/2023    K 4.9 03/07/2023    CO2 28.0 03/07/2023     (H) 03/07/2023    BUN 37 (H) 03/07/2023    CREATININE 1.62 (H) 03/07/2023    EGFRIFAFRI 65 11/16/2021    BCR 22.8 03/07/2023    ANIONGAP 7.0 03/07/2023     Lab Results   Component Value Date    TROPONINT 48 (H) 03/06/2023    TROPONINT 49 (H) 03/06/2023    TROPONINT 53 (C) 03/05/2023      Lab Results   Component Value Date    PROBNP 2,179.0 (H) 03/05/2023     Results for orders placed during the hospital encounter of 03/05/23    Adult Transthoracic Echo Complete W/ Cont if Necessary Per Protocol    Interpretation Summary  •  Left ventricular systolic function is hyperdynamic (EF > 70%). Calculated left ventricular EF = 74%  •  Left ventricular diastolic function is consistent with (grade I) impaired relaxation.  •  There is a small (<1cm) pericardial effusion.     Assessment     Acute on chronic heart failure with preserved ejection fraction   - transition to oral diuretics   - Follow BMP, wean oxygen as able   - Continue empagliflozin     Chronic  pericardial effusion, idiopathic   - Small on echocardiogram     Troponin elevation   - No symptoms of ACS and ECG with no ischemic changes, no RWMA on TTE, suspicious for demand ischemia in the setting of heart failure as well as chronic kidney disease   - Continue home aspirin and beta-blocker, statin     Hypertension   - Home meds reordered, can consider resumption of ARB if renal function stable     Disposition   - Cardiology will see as needed, please call with questions   - Plan for outpatient follow-up with his primary cardiologist, Dr. Shivam Lyon (on 4/7/23 per Care Everywhere)    GURWINDER Wells MD  3/8/2023  07:57 EST

## 2023-03-09 ENCOUNTER — TELEPHONE (OUTPATIENT)
Dept: ONCOLOGY | Facility: CLINIC | Age: 78
End: 2023-03-09

## 2023-03-09 ENCOUNTER — NURSE TRIAGE (OUTPATIENT)
Dept: CALL CENTER | Facility: HOSPITAL | Age: 78
End: 2023-03-09
Payer: MEDICARE

## 2023-03-09 NOTE — OUTREACH NOTE
Prep Survey    Flowsheet Row Responses   [facility] facility patient discharged from? [facility]   Is LACE score < 7 ? No   Eligibility Readm Mgmt   Discharge diagnosis Acute on chronic congestive heart failure   Does the patient have one of the following disease processes/diagnoses(primary or secondary)? CHF   Does the patient have Home health ordered? Yes   What is the Home health agency?  [facility]    Is there a DME ordered? No   Prep survey completed? Yes          VIRGEN SOL - Registered Nurse

## 2023-03-09 NOTE — TELEPHONE ENCOUNTER
Caller: Miguel Camejo    Relationship to patient: Self    Best call back number: [phone]    Chief complaint:PATIENT TO SCHEDULE FU AND BX UNDER MD POLLOCK DUE TO HOSPITAL VISIT    Type of visit: FU AND BX

## 2023-03-09 NOTE — CASE MANAGEMENT/SOCIAL WORK
Continued Stay Note  [facility]     Patient Name: Miguel Camejo  MRN: [id number]  Today's Date: 3/9/2023    Admit Date: 3/5/2023    Plan: Home [facility]   Discharge Plan     Row Name 03/09/23 1132       Plan    Plan Home [facility]    Patient/Family in Agreement with Plan yes    Plan Comments Called [facility] [phone] who will place patient on schedule and request orders and d/c summary be faxed to [phone].   Orders and updated notes faxed,  will fax d/c summary when it is available.    Final Discharge Disposition Code 06 - home with home health care               Discharge Codes    No documentation.               Expected Discharge Date and Time     Expected Discharge Date Expected Discharge Time    Mar 8, 2023             Yamilet Hou RN

## 2023-03-09 NOTE — TELEPHONE ENCOUNTER
"Pt called to request help with getting his medications filled at his pharmacy ([facility] in [place]); recently released from hospital [facility] on 3/8 and insurance wont pay for medications for some reason until Monday he states.  Have sent message to  to call pt to assist with this.  Pt verbalized understanding.    Reason for Disposition  • General information question, no triage required and triager able to answer question    Additional Information  • Negative: [1] Caller is not with the adult (patient) AND [2] reporting urgent symptoms  • Negative: Lab result questions  • Negative: Medication questions  • Negative: Caller can't be reached by phone  • Negative: Caller has already spoken to PCP or another triager  • Negative: RN needs further essential information from caller in order to complete triage  • Negative: Requesting regular office appointment  • Negative: [1] Caller requesting NON-URGENT health information AND [2] PCP's office is the best resource  • Negative: Health Information question, no triage required and triager able to answer question    Answer Assessment - Initial Assessment Questions  1. REASON FOR CALL or QUESTION: \"What is your reason for calling today?\" or \"How can I best help you?\" or \"What question do you have that I can help answer?\"      Pt was released from [facility] yesterday 3/8 and having issues getting medications filled at his pharmacy.    Protocols used: INFORMATION ONLY CALL - NO TRIAGE-ADULT-      "

## 2023-03-09 NOTE — CASE MANAGEMENT/SOCIAL WORK
Case Management Discharge Note      Final Note: CM called [facility] and updated of discharge late yesterday afternoon.         Selected Continued Care - Discharged on 3/8/2023 Admission date: 3/5/2023 - Discharge disposition: Home or Self Care    Destination    No services have been selected for the patient.              Durable Medical Equipment    No services have been selected for the patient.              Dialysis/Infusion    No services have been selected for the patient.              Home Medical Care Coordination complete.    Service Provider Selected Services Address Phone Fax Patient Preferred    [facility] ,  [facility] [address] [id number] [phone] --          Therapy    No services have been selected for the patient.              Community Resources    No services have been selected for the patient.              Community & DME    No services have been selected for the patient.                       Final Discharge Disposition Code: 06 - home with home health care

## 2023-03-10 LAB
BACTERIA SPEC AEROBE CULT: NORMAL
BACTERIA SPEC AEROBE CULT: NORMAL

## 2023-03-10 NOTE — DISCHARGE SUMMARY
[facility] Medicine Services  DISCHARGE SUMMARY    Patient Name: Miguel Camejo  : 1945  MRN: [id number]    Date of Admission: 3/5/2023  7:01 PM  Date of Discharge:   3/8/2023 (Wednesday)  Primary Care Physician: Cuong Hairston MD    Consults     Date and Time Order Name Status Description    3/8/2023 12:33 AM Inpatient Gastroenterology Consult Completed     3/6/2023  9:03 AM Inpatient General Surgery Consult Completed     3/6/2023 12:34 AM Inpatient Hematology & Oncology Consult Completed     3/6/2023 12:34 AM Inpatient Cardiology Consult Completed         Mark I. Brunner, MD - Gastroenterology  Mikel Coleman MD - General Surgery  Yuval Herrera MD - Hematology / Oncology  Damir Wells MD - Cardiology    US Guided Lymph Node Biospy - 3/7/2023 - Dr. Daniel Gallagher    Hospital Course     Presenting Problem:   Acute on chronic congestive heart failure, unspecified heart failure type (HCC) [I50.9]    Active Hospital Problems    Diagnosis  POA   • **Acute on chronic congestive heart failure, unspecified heart failure type (HCC) [I50.9]  Yes   • Thrombocytopenia (HCC) [D69.6]  Yes   • CKD (chronic kidney disease) stage 3, GFR 30-59 ml/min (HCC) [N18.30]  Yes   • Essential hypertension [I10]  Yes   • JONAH (obstructive sleep apnea) [G47.33]  Yes   • Rheumatoid arthritis (HCC) [M06.9]  Yes   • COPD (chronic obstructive pulmonary disease) (HCC) [J44.9]  Yes   • Type 2 diabetes mellitus (HCC) [E11.9]  Yes   • Inguinal lymphadenopathy [R59.0]  Yes   • Anemia [D64.9]  Yes      Resolved Hospital Problems   No resolved problems to display.      Small pericardial effusion    Hospital Course:  Miguel Camejo is a 78 y.o. male who presented to the ER on 3/5/2023 via EMS with multiple symptoms.  He presented by ambulance for shortness of breath, cough, congestion, abdominal fullness, and swelling in his legs.    He has a past medical history of diabetes, former smoker, esophagitis,  IBS, sleep apnea, morbid obesity, osteoarthritis in the knees, rheumatoid arthritis, shortness of breath, bowel problems, colitis, hypertension, chronic pancytopenia, and GERD.   His symptoms have been going on for approximately a week.   He sleeps in a recliner most of the time and does not treat his sleep apnea.  He follows with Dr. Herrera for chronic pancytopenia.   During last week's visit he had a temperature of 100.7.    He was admitted overnight and I was contacted by Dr. Herrera to ask general surgery for excisional biopsy of his lymph node.    After discussion between general surgery and oncology a decision was made for IR guided core needle biopsy.   On the first day in the hospital nursing reached out to us about a patient who is having abdominal discomfort and distention after eating food.   A stat KUB was obtained consistent with ileus.  CT of the abdomen and pelvis with IV contrast was obtained in the ER and there was some gallbladder luminal distention and suspected focal wall thickening recommendation for further evaluation with additional imaging was followed.  He was put on empiric reglan 5 mg every 6 hours IV and his abdominal symptoms improved.  HIDA scan was done and showed low gallbladder ejection fraction and concern for biliary dyskinesia.   Gastroenterology was consulted for abnormal findings and unclear etiology of abdominal distention.   Sounds like he had had symptoms for 2 weeks prior to hospitalization additionally.  GI recommended stopping Reglan and treating for small bowel bacterial overgrowth    CT from admission suggested there were enlarged bilateral inguinal lymph nodes.   A core biopsy was obtained by IR.   Left inguinal node biopsy obtained in the hospital was non-diagnostic.    Patient would like to go home today.   He was treated with some diuresis and Reglan IV and improved some.    Discharge Follow Up Recommendations for outpatient labs/diagnostics:  Transition to oral  diuresis.  It sounds like he was previously taking diuretics as needed.  Due to the fact that he is not treating his apnea he may need scheduled diuresis and follow-up with Dr. Shivam Lyon to prevent fluid build up    Day of Discharge     HPI:  Tolerating diet.  Feels better.   Would like to go home.  Would like me to discuss case with his Son, Miguel who is an [employer] [facility] [employer]    Review of Systems    Gen- No fevers, chills  CV- No chest pain, palpitations  Resp- No cough, dyspnea  GI- No N/V/D, abd pain    Vital Signs:     Temperature 98  Pulse 67  Respiratory rate 20  Blood pressure of 134/78  SPO2 of 95% on 2 L    Physical Exam:    Constitutional: No acute distress, awake, alert  HENT: NCAT, mucous membranes moist  Respiratory: Clear to auscultation bilaterally, respiratory effort normal   Cardiovascular: RRR, S1-S2  Gastrointestinal: Positive bowel sounds, soft, nontender, morbidly obese abdomen  Musculoskeletal: No bilateral ankle edema  Skin: No rashes    Pertinent  and/or Most Recent Results     LAB RESULTS:      Lab 03/08/23 0703 03/07/23 0619 03/06/23 0727 03/05/23 1918 03/05/23 1917   WBC 3.95 4.13 3.04*  --  3.85   HEMOGLOBIN 9.5* 8.4* 9.6*  --  9.5*   HEMATOCRIT 29.3* 25.5* 29.8*  --  29.4*   PLATELETS 126* 142 146  --  124*   NEUTROS ABS  --  2.83 2.38  --  2.37   IMMATURE GRANS (ABS)  --  0.02 0.01  --  0.02   LYMPHS ABS  --  0.75 0.52*  --  0.89   MONOS ABS  --  0.51 0.11  --  0.39   EOS ABS  --  0.00 0.00  --  0.14   MCV 92.4 91.1 92.3  --  93.0   PROCALCITONIN  --   --   --   --  0.07   LACTATE  --   --   --  0.9  --          Lab 03/08/23 0703 03/07/23 0619 03/06/23 0727 03/05/23 1917   SODIUM 143 143 143 145   POTASSIUM 4.5 4.9 5.1 4.6   CHLORIDE 107 108* 107 111*   CO2 29.0 28.0 25.0 27.0   ANION GAP 7.0 7.0 11.0 7.0   BUN 26* 37* 32* 26*   CREATININE 1.18 1.62* 1.69* 1.49*   EGFR 63.2 43.2* 41.0* 47.7*   GLUCOSE 79 107* 121* 104*   CALCIUM 8.7 8.1* 8.9 8.4*   HEMOGLOBIN A1C   --   --  5.10  --          Lab 03/07/23  0619 03/05/23 1917   TOTAL PROTEIN 5.7* 6.8   ALBUMIN 3.4* 3.8   GLOBULIN 2.3 3.0   ALT (SGPT) 13 15   AST (SGOT) 16 19   BILIRUBIN 0.4 0.5   ALK PHOS 63 80         Lab 03/06/23  0727 03/06/23  0021 03/05/23 1917   PROBNP  --   --  2,179.0*   HSTROP T 48* 49* 53*         Lab 03/06/23 0727   CHOLESTEROL 130   LDL CHOL 80   HDL CHOL 37*   TRIGLYCERIDES 59             Lab 03/05/23 2039   PH, ARTERIAL 7.315*   PCO2, ARTERIAL 53.2*   PO2 ART 74.8*   FIO2 32   HCO3 ART 27.1*   BASE EXCESS ART 0.3   CARBOXYHEMOGLOBIN 1.8     Brief Urine Lab Results     None        Microbiology Results (last 10 days)     Procedure Component Value - Date/Time    Blood Culture - Blood, Arm, Left [[id number]]  (Normal) Collected: 03/05/23 1942    Lab Status: Preliminary result Specimen: Blood from Arm, Left Updated: 03/09/23 2000     Blood Culture No growth at 4 days    COVID PRE-OP / PRE-PROCEDURE SCREENING ORDER (NO ISOLATION) - Swab, Nasopharynx [[id number]]  (Normal) Collected: 03/05/23 1934    Lab Status: Final result Specimen: Swab from Nasopharynx Updated: 03/05/23 2002    Narrative:      The following orders were created for panel order COVID PRE-OP / PRE-PROCEDURE SCREENING ORDER (NO ISOLATION) - Swab, Nasopharynx.  Procedure                               Abnormality         Status                     ---------                               -----------         ------                     COVID-19 and FLU A/B PCR...[[id number]]  Normal              Final result                 Please view results for these tests on the individual orders.    COVID-19 and FLU A/B PCR - Swab, Nasopharynx [[id number]]  (Normal) Collected: 03/05/23 1934    Lab Status: Final result Specimen: Swab from Nasopharynx Updated: 03/05/23 2002     COVID19 Not Detected     Influenza A PCR Not Detected     Influenza B PCR Not Detected    Narrative:      Fact sheet for providers: https://www.fda.gov/media/330131/download    Fact  sheet for patients: https://www.fda.gov/media/027060/download    Test performed by PCR.    Blood Culture - Blood, Arm, Right [[id number]]  (Normal) Collected: 03/05/23 1933    Lab Status: Preliminary result Specimen: Blood from Arm, Right Updated: 03/09/23 2000     Blood Culture No growth at 4 days          Adult Transthoracic Echo Complete W/ Cont if Necessary Per Protocol    Result Date: 3/6/2023  •  Left ventricular systolic function is hyperdynamic (EF > 70%). Calculated left ventricular EF = 74% •  Left ventricular diastolic function is consistent with (grade I) impaired relaxation. •  There is a small (<1cm) pericardial effusion.     CT Abdomen Pelvis With Contrast    Result Date: 3/5/2023  EXAMINATION:  CTA CHEST, CT ABDOMEN AND PELVIS WITH IV CONTRAST DATE OF EXAM: 3/5/2023 8:44 PM HISTORY: chest pain , abdominal pain and distention TECHNIQUE: CTA of the chest followed by routine CT abdomen and pelvis was performed following the intravenous administration of iodinated contrast. Sagittal, coronal and 3-D reformatted images were provided. CT dose lowering techniques were used, to include: automated exposure control, adjustment for patient size, and or use of iterative reconstruction. 3-D MIP images were performed under concurrent supervision not requiring an independent workstation, in order to better visualize the vasculature. COMPARISON: None FINDINGS: CHEST CTA: Lungs:  Moderate peripheral and lobular septal thickening. There is also minor patchy groundglass in the upper lungs. Bilateral dependent opacities right lung greater than left. There is some calcific densities at the lung bases, possibly prior barium aspiration. There are moderate emphysematous changes in the periphery of the right upper lobe. Pleura: Small bilateral pleural effusions. Mediastinum And Gillian: Top normal size mediastinal lymph nodes. Pulmonary Arteries: Suboptimally opacified. No filling defect. 3-D images corroborate 2-D findings.  Cardiovascular: Small pericardial effusion. No thoracic aortic aneurysm or dissection . Minor coronary artery atherosclerotic calcifications. Chest Wall:  Normal. ABDOMEN/PELVIS: Liver: There are a few small cysts. Other subcentimeter hypodensities too small to characterize. Gallbladder/Biliary: Gallbladder lumen is distended. There is suggestion of a focal area of gallbladder wall edema. No radiopaque gallstone. Pancreas: Normal. Spleen: Normal. Adrenal Glands: Normal. Kidneys: Normal. GI Tract: No bowel dilation. Mesentery/Peritoneum: Trace ascites. Vasculature: Minor atherosclerotic calcifications. Lymph Nodes: Bilateral enlarged inguinal lymph nodes which retains fatty edgar and reniform shape Abdominal Wall: Minor body wall edema. Bladder:  Decompressed Reproductive: Normal. MUSCULOSKELETAL: Degenerative changes in the spine     1.  Moderate CHF/volume overload including small bilateral pleural effusions, small pericardial effusion, and pulmonary interstitial edema. In this setting the minor patchy groundglass pulmonary opacities likely represent alveolar edema. Recommend follow-up chest CT in six months to ensure resolution. 2.  Gallbladder luminal distention and suspected focal wall thickening. Recommend further evaluation with ultrasound or HIDA for acute cholecystitis. 3.  Enlarged bilateral inguinal lymph nodes, favored to be reactive given morphology. Correlate for lower extremity infection. 4.  No evidence of pulmonary embolus. 5.  Chronic findings as above. Electronically signed by:  Dario Voss M.D.  3/5/2023 7:36 PM Mountain Time    US Gallbladder    Result Date: 3/6/2023  US GALLBLADDER Date of Exam: 3/6/2023 9:07 AM EST Indication: Abdominal swelling, gallbladder wall thickening on CT. Comparison: CT abdomen 3/5/2023 Technique: Grayscale and color Doppler ultrasound evaluation of the right upper quadrant was performed. Findings: Limited images of the pancreas are unremarkable. Liver parenchyma is  heterogeneous. There are multiple hepatic cysts. No suspicious solid appearing liver lesion identified. Hepatic vasculature is within normal limits. Gallbladder is within normal limits in size. No gallstones are seen. There is no gallbladder wall thickening or pericholecystic fluid. Common hepatic duct is within normal limits measuring 3 mm The right kidney measures 10.3 cm in [id number] length. There is normal thickness and normal echogenicity of the renal parenchyma. There is no hydronephrosis. Small right pleural effusion is noted.     Impression: 1. Gallbladder appears within normal limits. No gall but are well thickening or pericholecystic fluid. No gallstones identified. No biliary tract obstruction. 2. Small right pleural effusion 3. Multiple hepatic cyst Electronically Signed: Sunil Block  3/6/2023 9:28 AM EST  Workstation ID: [id number]    NM HIDA SCAN WITH PHARMACOLOGICAL INTERVENTION    Result Date: 3/7/2023  DATE OF EXAM: 3/7/2023 12:07 PM EST PROCEDURE: NM HIDA SCAN WITH PHARMACOLOGICAL INTERVENTION INDICATIONS: concern for gallbladder disease COMPARISON: Gallbladder ultrasound 3/6/2023. TECHNIQUE: The patient received 7.30 mCi of mebrofenin intravenously and images were obtained of the abdomen in the anterior projection through 60 minutes. Patient was administered 2.2mcg of Kinevac to assess gallbladder emptying. Counts were obtained over the gallbladder to calculate the ejection fraction. FINDINGS: Normal radiopharmaceutical uptake in the liver. Gallbladder is visualized within the first 20 minutes of imaging, indicating patency of the cystic duct. No scintigraphic evidence of acute or chronic cholecystitis. Progression of radiopharmaceutical into the small bowel indicates patency of the common bile duct. The calculated gallbladder ejection fraction is abnormally low at 19%.     1. No scintigraphic evidence of acute or chronic cholecystitis. 2. Abnormally low gallbladder ejection fraction, 19%,  which may represent changes of biliary dyskinesia. Electronically Signed: Mary Jones  3/7/2023 2:23 PM EST  Workstation ID: [id number]    XR Chest 1 View    Result Date: 3/5/2023  XR CHEST 1 VW Date of Exam: 3/5/2023 7:06 PM EST Indication: SOA triage protocol. Comparison: CT chest 5/18/2020 Findings: Lungs are normally expanded. Severe cardiomegaly. Airspace opacity in lung bases and right middle lobe. No pneumothorax.     Impression: Multifocal airspace opacity right lung worse than left with cardiomegaly. Multifocal pneumonia is favored over pulmonary edema. Electronically Signed: Francoise Hightower  3/5/2023 7:47 PM EST  Workstation ID: [id number]    CT Angiogram Chest Pulmonary Embolism    Result Date: 3/5/2023  EXAMINATION:  CTA CHEST, CT ABDOMEN AND PELVIS WITH IV CONTRAST DATE OF EXAM: 3/5/2023 8:44 PM HISTORY: chest pain , abdominal pain and distention TECHNIQUE: CTA of the chest followed by routine CT abdomen and pelvis was performed following the intravenous administration of iodinated contrast. Sagittal, coronal and 3-D reformatted images were provided. CT dose lowering techniques were used, to include: automated exposure control, adjustment for patient size, and or use of iterative reconstruction. 3-D MIP images were performed under concurrent supervision not requiring an independent workstation, in order to better visualize the vasculature. COMPARISON: None FINDINGS: CHEST CTA: Lungs:  Moderate peripheral and lobular septal thickening. There is also minor patchy groundglass in the upper lungs. Bilateral dependent opacities right lung greater than left. There is some calcific densities at the lung bases, possibly prior barium aspiration. There are moderate emphysematous changes in the periphery of the right upper lobe. Pleura: Small bilateral pleural effusions. Mediastinum And Gillian: Top normal size mediastinal lymph nodes. Pulmonary Arteries: Suboptimally opacified. No filling defect. 3-D images corroborate 2-D  findings. Cardiovascular: Small pericardial effusion. No thoracic aortic aneurysm or dissection . Minor coronary artery atherosclerotic calcifications. Chest Wall:  Normal. ABDOMEN/PELVIS: Liver: There are a few small cysts. Other subcentimeter hypodensities too small to characterize. Gallbladder/Biliary: Gallbladder lumen is distended. There is suggestion of a focal area of gallbladder wall edema. No radiopaque gallstone. Pancreas: Normal. Spleen: Normal. Adrenal Glands: Normal. Kidneys: Normal. GI Tract: No bowel dilation. Mesentery/Peritoneum: Trace ascites. Vasculature: Minor atherosclerotic calcifications. Lymph Nodes: Bilateral enlarged inguinal lymph nodes which retains fatty edgar and reniform shape Abdominal Wall: Minor body wall edema. Bladder:  Decompressed Reproductive: Normal. MUSCULOSKELETAL: Degenerative changes in the spine     1.  Moderate CHF/volume overload including small bilateral pleural effusions, small pericardial effusion, and pulmonary interstitial edema. In this setting the minor patchy groundglass pulmonary opacities likely represent alveolar edema. Recommend follow-up chest CT in six months to ensure resolution. 2.  Gallbladder luminal distention and suspected focal wall thickening. Recommend further evaluation with ultrasound or HIDA for acute cholecystitis. 3.  Enlarged bilateral inguinal lymph nodes, favored to be reactive given morphology. Correlate for lower extremity infection. 4.  No evidence of pulmonary embolus. 5.  Chronic findings as above. Electronically signed by:  Dario Voss M.D.  3/5/2023 7:36 PM Mountain Time    US Guided Lymph Node Biopsy    Result Date: 3/7/2023  US GUIDED LYMPH NODE BIOPSY                                                         History: recommended by Oncology    : Daniel Gallagher MD.                                                                             Modality: Ultrasound                                                                                                       No Sedation was used.          Anesthesia: Lidocaine local infiltration. Estimated blood loss:  < 5 cc.         Technique: A thorough discussion of the risks, benefits, and alternatives of the procedure, blood transfusion if needed, and if applicable, moderate sedation was carried out with the patient or the patient's next of kin. Any questions were answered. They verbalized  understanding. A written informed consent was then signed.  A timeout was performed prior to starting the procedure. The procedure room personnel used personal protective equipment. The operators used sterile gowns and gloves in addition. The surgical site was prepped with chlorhexidine gluconate and draped in the maximal sterile fashion. A preliminary ultrasonography was performed to assess the target and determine a safe access site. It showed pathologic left inguinal lymph node.. Pertinent ultrasound images were secured to the PACS for documentation. A left inguinal crease access site was selected and sterilely prepped and draped. A dermatotomy was performed. Using aseptic precautions, under real-time ultrasonographic guidance, the target lesion was accessed with a 17-gauge guide. Using an 18-gauge coaxial biopsy device, multiple Core biopsies of the targeted tissue were performed. The specimen was submitted in formalin for further processing. At the end of the procedure, an aseptic dressing applied. The patient tolerated the procedure well. After recovery, the patient was discharged from the department in stable condition.           Complications: None immediate. Cores: Number of Cores if obtained: Not applicable Specimen: The specimen was labeled and sent to the lab in appropriate carriers & containers if such was requested by the ordering provider.                                                                         Impression:                                                               Successful ultrasound-guided core biopsy of a pathologic left inguinal lymph node. Thank you for the opportunity to assist in the care of your patient. Electronically Signed: Daniel Aileen  3/7/2023 5:10 PM EST  Workstation ID: [id number]    XR Abdomen KUB    Result Date: 3/6/2023  XR ABDOMEN KUB Date of Exam: 3/6/2023 3:50 PM EST Indication: abdominal bloating pain Comparison: CT abdomen and pelvis 3/5/2023 Findings: There is mild gaseous distention of small bowel and colon. No abnormal calcifications are seen. Included lung bases are grossly clear.     Impression: Mild gaseous distention of small bowel and colon in a pattern suggestive of ileus. Electronically Signed: Dee Louis  3/6/2023 4:51 PM EST  Workstation ID: [id number]              Results for orders placed during the hospital encounter of 03/05/23    Adult Transthoracic Echo Complete W/ Cont if Necessary Per Protocol    Interpretation Summary  •  Left ventricular systolic function is hyperdynamic (EF > 70%). Calculated left ventricular EF = 74%  •  Left ventricular diastolic function is consistent with (grade I) impaired relaxation.  •  There is a small (<1cm) pericardial effusion.      Pending Labs     Order Current Status    Blood Culture - Blood, Arm, Left Preliminary result    Blood Culture - Blood, Arm, Right Preliminary result        Discharge Details        Discharge Medications      New Medications      Instructions Start Date   riFAXIMin 550 MG tablet  Commonly known as: XIFAXAN   550 mg, Oral, Every 8 Hours Scheduled      terazosin 5 MG capsule  Commonly known as: HYTRIN  Replaces: doxazosin 4 MG tablet   5 mg, Oral, Every 12 Hours Scheduled         Changes to Medications      Instructions Start Date   allopurinol 100 MG tablet  Commonly known as: Zyloprim  What changed:   · medication strength  · how much to take   50 mg, Oral, Daily      amLODIPine 10 MG tablet  Commonly known as: NORVASC  What changed:   · medication strength  · how much to  take  · when to take this   10 mg, Oral, Every 24 Hours Scheduled      atorvastatin 80 MG tablet  Commonly known as: LIPITOR  What changed:   · medication strength  · how much to take  · when to take this   80 mg, Oral, Nightly      furosemide 40 MG tablet  Commonly known as: LASIX  What changed:   · how much to take  · when to take this  · additional instructions   20 mg, Oral, Daily         Continue These Medications      Instructions Start Date   albuterol sulfate  (90 Base) MCG/ACT inhaler  Commonly known as: PROVENTIL HFA;VENTOLIN HFA;PROAIR HFA   2 puffs, Inhalation, Every 6 Hours PRN      aspirin 81 MG EC tablet   81 mg, Oral, Daily      Breo Ellipta 100-25 MCG/ACT aerosol powder   Generic drug: Fluticasone Furoate-Vilanterol   1 puff, Inhalation, Daily      Colcrys 0.6 MG tablet  Generic drug: colchicine   Take 1 tablet by mouth As Needed.      famotidine 20 MG tablet  Commonly known as: PEPCID   20 mg, Oral, 2 Times Daily      ferrous sulfate 325 (65 Fe) MG tablet   325 mg, Oral, Daily With Breakfast, OTC      hydrALAZINE 25 MG tablet  Commonly known as: APRESOLINE   50 mg, Oral, 2 Times Daily      Jardiance 10 MG tablet tablet  Generic drug: empagliflozin   10 mg, Oral, Daily      methylcellulose (Laxative) 500 MG tablet tablet  Commonly known as: CITRUCEL   1 tablet, Oral, 2 Times Daily      metoprolol succinate  MG 24 hr tablet  Commonly known as: TOPROL-XL   100 mg, Oral, 2 Times Daily      minoxidil 10 MG tablet  Commonly known as: LONITEN   10 mg, Oral, 4 Times Daily      omeprazole 40 MG capsule  Commonly known as: priLOSEC   40 mg, Oral, Daily With Breakfast, Lunch & Dinner      oxybutynin 5 MG tablet  Commonly known as: DITROPAN   5 mg, Oral, 2 Times Daily      potassium chloride 20 MEQ CR tablet  Commonly known as: K-DUR,KLOR-CON   1 tablet, Oral, Daily      tiotropium 18 MCG per inhalation capsule  Commonly known as: SPIRIVA   1 capsule, Inhalation, Every Morning      VISBIOME HIGH  POTENCY PO   112.5 mg, Oral, 2 Times Daily, OTC      vitamin C 250 MG tablet  Commonly known as: ASCORBIC ACID   250 mg, Oral, 2 Times Daily, OTC         Stop These Medications    Baclofen 5 MG tablet     cetirizine 10 MG tablet  Commonly known as: zyrTEC     doxazosin 4 MG tablet  Commonly known as: CARDURA  Replaced by: terazosin 5 MG capsule     finasteride 5 MG tablet  Commonly known as: PROSCAR     folic acid 1 MG tablet  Commonly known as: FOLVITE     losartan 100 MG tablet  Commonly known as: COZAAR            Allergies   Allergen Reactions   • Lisinopril Swelling   • Benazepril Swelling     Discharge Disposition:  Home or Self Care    Diet:  Hospital:No active diet order    Activity:  As tolerated    Restrictions or Other Recommendations:   Consider re-evaluation with sleep medicine.   Continue diuresis and follow-up with primary care physician.   Outpatient follow-up with Dr. Herrera.  Outpatient follow-up with primary gastroenterologist Dr. Perez       CODE STATUS:    Code Status and Medical Interventions:   Ordered at: 03/05/23 1612     Code Status (Patient has no pulse and is not breathing):    CPR (Attempt to Resuscitate)     Medical Interventions (Patient has pulse or is breathing):    Full Support       Future Appointments   Date Time Provider Department Center   3/15/2023  9:00 AM Yaneth Joshua APRN [facility]   3/17/2023  2:30 PM Yuval Herrera MD [facility]       Additional Instructions for the Follow-ups that You Need to Schedule     Ambulatory Referral to Home Health   As directed      Face to Face Visit Date: 3/8/2023    Follow-up provider for Plan of Care?: I treated the patient in an acute care facility and will not continue treatment after discharge.    Follow-up provider: RUSSELL HAWK [[id number]]    Reason/Clinical Findings: Acute on CHronic Congestive heart failure, unmspecified heart failure typo    Describe mobility limitations that make leaving home difficult: weakness,  impaired physical mobility    Nursing/Therapeutic Services Requested: Physical Therapy Skilled Nursing    Skilled nursing orders: Cardiopulmonary assessments Medication education CHF management    PT orders: Therapeutic exercise Gait Training Transfer training Strengthening Home safety assessment    Weight Bearing Status: As Tolerated         Discharge Follow-up with PCP   As directed       Currently Documented PCP:    Cuong Hairston MD    PCP Phone Number:    [phone]     Follow Up Details: Primary Care Provider - 1 week - re-assess blood pressure and symptoms - try to help with CPAP         Discharge Follow-up with Specialty: Gastroenterology; 2 Weeks   As directed      Specialty: Gastroenterology    Follow Up: 2 Weeks    Follow Up Details: [facility] - treatment for 14 days - follow up with Dr. Joss Perez         Discharge Follow-up with Specialty: Ruthie/Karen Herrera MD; 1 Week   As directed      Specialty: Ruthie/Karen Herrera MD    Follow Up: 1 Week    Follow Up Details: referral for Excisional Biopsy         Discharge Follow-up with Specialty: Primary Cardiologist; 1 Month   As directed      Specialty: Primary Cardiologist    Follow Up: 1 Month    Follow Up Details: appt with Dr. Alexi Lyon - 4/7/2023             Called patient's son Miguel in the [facility] ER on the day of the patient's discharge.   Discussed findings in this case during this hospitalization and some of the uncertainties associated with treatment.   Ultimately he did get better.   Discussed the fact that the pathology was negative and that he may need follow-up with Dr. Herrera.   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  He will go home on scheduled  diuresis.    Paul Castro MD  03/09/23      Time Spent on Discharge:  I spent  47  minutes on this discharge activity which included: face-to-face encounter with the patient, reviewing the data in the system, coordination of the care with the nursing staff as well as consultants, documentation, and entering orders.

## 2023-03-13 ENCOUNTER — NURSE TRIAGE (OUTPATIENT)
Dept: CALL CENTER | Facility: HOSPITAL | Age: 78
End: 2023-03-13
Payer: MEDICARE

## 2023-03-13 NOTE — TELEPHONE ENCOUNTER
Reason for Disposition  • [1] Prescription not at pharmacy AND [2] was prescribed by PCP recently (Exception: triager has access to EMR and prescription is recorded there. Go to Home Care and confirm for pharmacy.)    Additional Information  • Negative: [1] Intentional drug overdose AND [2] suicidal thoughts or ideas  • Negative: Drug overdose and triager unable to answer question  • Negative: Caller requesting information unrelated to medicine  • Negative: Caller requesting information about COVID-19 Vaccine  • Negative: Caller requesting information about Emergency Contraception  • Negative: Caller requesting information about Combined Birth Control Pills  • Negative: Caller requesting information about Progestin Birth Control Pills  • Negative: Caller requesting information about Post-Op pain or medicines  • Negative: Caller requesting a prescription antibiotic (such as Penicillin) for Strep throat and has a positive culture result  • Negative: Caller requesting a prescription anti-viral med (such as Tamiflu) and has influenza (flu)  symptoms  • Negative: Immunization reaction suspected  • Negative: Rash while taking a medicine or within 3 days of stopping it  • Negative: [1] Asthma and [2] having symptoms of asthma (cough, wheezing, etc.)  • Negative: [1] Symptom of illness (e.g., headache, abdominal pain, earache, vomiting) AND [2] more than mild  • Negative: Breastfeeding questions about mother's medicines and diet  • Negative: MORE THAN A DOUBLE DOSE of a prescription or over-the-counter (OTC) drug  • Negative: [1] DOUBLE DOSE (an extra dose or lesser amount) of prescription drug AND [2] any symptoms (e.g., dizziness, nausea, pain, sleepiness)  • Negative: [1] DOUBLE DOSE (an extra dose or lesser amount) of over-the-counter (OTC) drug AND [2] any symptoms (e.g., dizziness, nausea, pain, sleepiness)  • Negative: Took another person's prescription drug  • Negative: [1] DOUBLE DOSE (an extra dose or lesser  "amount) of prescription drug AND [2] NO symptoms (Exception: a double dose of antibiotics)  • Negative: Diabetes drug error or overdose (e.g., took wrong type of insulin or took extra dose)  • Negative: [1] Prescription refill request for ESSENTIAL medicine (i.e., likelihood of harm to patient if not taken) AND [2] triager unable to refill per department policy    Answer Assessment - Initial Assessment Questions  1. NAME of MEDICATION: \"What medicine are you calling about?\"      Rifaximin 550mg take one po every 8 hours for 41 doses  2. QUESTION: \"What is your question?\" (e.g., medication refill, side effect)      Insurance will no cover  3. PRESCRIBING HCP: \"Who prescribed it?\" Reason: if prescribed by specialist, call should be referred to that group.      Paul Castro MD  4. SYMPTOMS: \"Do you have any symptoms?\"      IBS  5. SEVERITY: If symptoms are present, ask \"Are they mild, moderate or severe?\"      na  6. PREGNANCY:  \"Is there any chance that you are pregnant?\" \"When was your last menstrual period?\"      na    Protocols used: MEDICATION QUESTION CALL-ADULT-AH      "

## 2023-03-15 ENCOUNTER — OFFICE VISIT (OUTPATIENT)
Dept: CARDIOLOGY | Facility: HOSPITAL | Age: 78
End: 2023-03-15
Payer: MEDICARE

## 2023-03-15 VITALS
WEIGHT: 235.38 LBS | HEART RATE: 74 BPM | TEMPERATURE: 98.8 F | DIASTOLIC BLOOD PRESSURE: 60 MMHG | OXYGEN SATURATION: 97 % | BODY MASS INDEX: 35.67 KG/M2 | HEIGHT: 68 IN | SYSTOLIC BLOOD PRESSURE: 136 MMHG | RESPIRATION RATE: 18 BRPM

## 2023-03-15 DIAGNOSIS — I50.33 ACUTE ON CHRONIC DIASTOLIC CONGESTIVE HEART FAILURE: Primary | ICD-10-CM

## 2023-03-15 DIAGNOSIS — I10 ESSENTIAL HYPERTENSION: ICD-10-CM

## 2023-03-15 DIAGNOSIS — J44.9 CHRONIC OBSTRUCTIVE PULMONARY DISEASE, UNSPECIFIED COPD TYPE: ICD-10-CM

## 2023-03-15 PROCEDURE — 1159F MED LIST DOCD IN RCRD: CPT | Performed by: NURSE PRACTITIONER

## 2023-03-15 PROCEDURE — 3078F DIAST BP <80 MM HG: CPT | Performed by: NURSE PRACTITIONER

## 2023-03-15 PROCEDURE — 1160F RVW MEDS BY RX/DR IN RCRD: CPT | Performed by: NURSE PRACTITIONER

## 2023-03-15 PROCEDURE — 99214 OFFICE O/P EST MOD 30 MIN: CPT | Performed by: NURSE PRACTITIONER

## 2023-03-15 PROCEDURE — 3075F SYST BP GE 130 - 139MM HG: CPT | Performed by: NURSE PRACTITIONER

## 2023-03-15 NOTE — PROGRESS NOTES
"Chief Complaint  Congestive Heart Failure and Establish Care    Subjective    History of Present Illness {CC  Problem List  Visit  Diagnosis   Encounters  Notes  Medications  Labs  Result Review Imaging  Media :23}       History of Present Illness   78 year old male presents to the office today for ongoing evaluation of his diastolic heart failure.  Patient was recently hospitalized at [facility] with heart failure exacerbation.  He follows with Dr. Lyon at [place] [facility] and has an upcoming appointment in April.  He has a past medical history significant of diabetes, remote tobacco abuse, IBS, sleep apnea, esophagitis, obesity, osteoarthritis, rheumatoid arthritis, colitis, hypertension, chronic pancytopenia,  GERD.  Is currently followed by home health and presents today with heart rate, oxygen, weight and blood pressure log.  O2 sat at home is 91 to 93% on room air, heart rates in the 70s blood pressures 1 30-1 39 systolic over 60s and weight has decreased.  Home weight is currently 231 pounds.  He reports that he is doing well and notes dyspnea has improved dramatically.  He only reports some mild pedal edema.  Objective     Vital Signs:   Vitals:    03/15/23 0856 03/15/23 0858 03/15/23 0900   BP: 138/63 141/62 136/60   BP Location: Right arm Left arm Left arm   Patient Position: Sitting Standing Sitting   Cuff Size: Adult Adult Adult   Pulse: 72 74 74   Resp:   18   Temp:   98.8 °F (37.1 °C)   TempSrc:   Temporal   SpO2: 95% 97% 97%   Weight:   107 kg (235 lb 6 oz)   Height:   172.7 cm (67.99\")     Body mass index is 35.8 kg/m².  Physical Exam  Vitals and nursing note reviewed.   Constitutional:       Appearance: Normal appearance.   HENT:      Head: Normocephalic.   Eyes:      Pupils: Pupils are equal, round, and reactive to light.   Cardiovascular:      Rate and Rhythm: Normal rate and regular rhythm.      Pulses: Normal pulses.      Heart sounds: Normal heart sounds. No murmur " heard.  Pulmonary:      Effort: Pulmonary effort is normal.      Breath sounds: Normal breath sounds.   Abdominal:      General: Bowel sounds are normal.      Palpations: Abdomen is soft.   Musculoskeletal:         General: Normal range of motion.      Cervical back: Normal range of motion.      Right lower leg: Edema (1+) present.      Left lower leg: Edema (1+) present.   Skin:     General: Skin is warm and dry.      Capillary Refill: Capillary refill takes less than 2 seconds.   Neurological:      Mental Status: He is alert and oriented to person, place, and time.   Psychiatric:         Mood and Affect: Mood normal.         Thought Content: Thought content normal.              Result Review  Data Reviewed:{ Labs  Result Review  Imaging  Med Tab  Media :23}   CBC (No Diff) (03/08/2023 07:03)  Basic Metabolic Panel (03/08/2023 07:03)  ECG 12 Lead ED Triage Standing Order; SOA (03/05/2023 19:10)  Adult Transthoracic Echo Complete W/ Cont if Necessary Per Protocol (03/06/2023 11:41)               Assessment and Plan {CC Problem List  Visit Diagnosis  ROS  Review (Popup)  Health Maintenance  Quality  BestPractice  Medications  SmartSets  SnapShot Encounters  Media :23}   1. Acute on chronic diastolic congestive heart failure (HCC)  increase Lasix to 40 mg daily for the next 3 days  Continue Jardiance, Toprol    2. Essential hypertension  Stable on amlodipine, hydralazine, Toprol, terazosin  Continue to monitor closely  3. Chronic obstructive pulmonary disease, unspecified COPD type (HCC)  Currently without exacerbation  Continue oxygen          Follow Up {Instructions Charge Capture  Follow-up Communications :23}   Return if symptoms worsen or fail to improve.    Patient was given instructions and counseling regarding his condition or for health maintenance advice. Please see specific information pulled into the AVS if appropriate.  Patient was instructed to call the [facility] with any  questions, concerns, or worsening symptoms.

## 2023-03-17 ENCOUNTER — OFFICE VISIT (OUTPATIENT)
Dept: ONCOLOGY | Facility: CLINIC | Age: 78
End: 2023-03-17
Payer: MEDICARE

## 2023-03-17 VITALS
HEIGHT: 68 IN | OXYGEN SATURATION: 90 % | HEART RATE: 71 BPM | RESPIRATION RATE: 20 BRPM | SYSTOLIC BLOOD PRESSURE: 135 MMHG | BODY MASS INDEX: 35.61 KG/M2 | DIASTOLIC BLOOD PRESSURE: 58 MMHG | TEMPERATURE: 98.4 F | WEIGHT: 235 LBS

## 2023-03-17 DIAGNOSIS — D64.9 ANEMIA, UNSPECIFIED TYPE: Primary | ICD-10-CM

## 2023-03-17 DIAGNOSIS — R59.1 LYMPHADENOPATHY: ICD-10-CM

## 2023-03-17 PROCEDURE — 99215 OFFICE O/P EST HI 40 MIN: CPT | Performed by: INTERNAL MEDICINE

## 2023-03-17 NOTE — PROGRESS NOTES
CHIEF COMPLAINT: No new somatic complaints.  Inguinal wound healing    Problem List:  Oncology/Hematology History Overview Note   1.  Pancytopenia with combination of anemia renal disease, possible sequestration with the hepatomegaly/portal vein dilation/SMV dilation on February 2020 liver spleen ultrasound suggesting portal hypertension and possible contribution from the gastric vascular ectasias on EGD  elevation of serum kappa and lambda light chains and gastric angioectasias on 7/17/2020 EGD  2.  Chronic low back pain   3.  Chronic kidney disease  4.  Type 2 diabetes  5.  Hyperlipidemia  6.  Hypertension  7.  Hypogonadism  8.  Depression  9. COPD  10.  Obstructive sleep apnea  11.  Reflux   12.  Gout  13 osteoarthritis  14.  BPH with ED  15.  BMI 36  16.  Putative history of rheumatoid arthritis  17.  Chronic granulomatous lung nodules on CT chest screening with routine follow-up recommended  18.  Scant elevation of serum light chains without intact monoclonal gammopathy and no plasma cell dyscrasia on bone marrow biopsy August 2020  19.  Inguinal adenopathy    Hematology history:  -Longstanding history of heme positive stools dating back to the late 80s with at least 5 colonoscopies by Dr. Perez including the last 1/2018 as well as EGDs and a capsule endoscopy that apparently showed scattered blood that they could not do much about.In the [facility] system we have hemoglobins of 9.8 and platelet of 121,000 with white count 4880 as of June 2016 and December 2015 white count 3020 with platelets 126,000 hemoglobin 11.4.  Does have a history of heavy alcohol use but none in the past several years.  -8/7/2019 Hemoccult negative  -1/30/2020 reticulocyte count 1.4%.  B12 618.  Iron slightly low 48.  White count 2700 with hemoglobin 11.6 MCV 88 and normal red cell distribution width with platelets 124,000.  Normal thyroid functions.  -2/4/2020 white count 3100 hemoglobin 11.7 with normal MCV and red cell distribution with  platelets 126,000 normal liver enzymes and bilirubin including fractionation.  Creatinine 1.36 GFR greater than 60 with normal calcium 9.1.  .  C-reactive protein 9.67 with sedimentation rate elevated at 40 as well.  Was started on iron with vitamin C.    -2/27/2020 initial [facility] hematology consultation: He had dark tarry stools predating the institution of his current iron and extensive prior endoscopic work-up as outlined above.  I will check his methylmalonic acid and homocystine along with repeat B12 and folate and with his elevated C-reactive protein and sedimentation rate will check a serum immunoelectrophoresis and light chains.  Given his prior drinking history despite the fact that his liver enzymes have been normal I strongly suspect portal hypertension and I suspect the GI bleeding may be related to this but we will get records from .  If we do not get answers from this then we will proceed with bone marrow biopsy.  If there is a monoclonal gammopathy we will also proceed with bone marrow biopsy.  With his putative history of rheumatoid arthritis he could have splenomegaly with Felty syndrome as well and I will check his ROSARIO and rheumatoid factor.    -2/27/20 data:  Hgb 11.4 o/w normal CBC  Periph. Smear mild hypochromic anemia with mature WBC's    ZACKARY neg  Haptogb 213 high  Retic 2 %    Nl bili direct and indirect  Urine hemosiderin negative  Plasma free hemoglobin normal 7  G6PD 279 normal    ROSARIO neg  RF <10    Epo 22.4    Cr 1.6     nl  B12>2k  Homocysteine 10.5  Folate >20    Liver Spleen US:  Portal vein 1.7cm dilated  Smv Dilated 1.3 cm  But nl spleen size  Hepatomegaly with abnormal liver echogenicity.    Serum K 57.9, lambda 30.7, ratio 1.89.  Serum M spike zero    -5/26/2020 CT chest low-dose without contrast shows chronic granulomatous nodules lung RADS 2 with recommended annual follow-up.  Increased prominence of mucosal thickening distal esophagus compared to 2017  suggestive of esophagitis.    -7/17/2020 gastric angioma ectasias on EGD with Dr. Perez.  No esophageal abnormalities to corroborate with the above CT findings.    -7/27/2020 data:  White count 3600 with hemoglobin 11.6 platelets 137,000.  Creatinine 1.3 with GFR 65  Beta-2 microglobulin 3.2, upper limit 2.2  C-reactive protein 0.49/sedimentation rate 24  Serum immunoelectrophoresis showed no monoclonal spike  Serum free kappa light chains 52.8 with ratio of 2.25  Urine kappa light chains normal 41 with lambda normal 6 and ratio normal at 6  Bone survey essentially negative for myeloma save for a small minor posterior calvarial abnormality most likely venous lake.  Coincidental cardiomegaly with mild pulmonary vascular congestion.      -8/4/2020 data:   Sedimentation rate 37  White count 3100 hemoglobin 11.7 with platelets 115,000  Creatinine 1.27 with GFR greater than 60 and calcium 9.1 normal with normal total protein and albumin    -8/17/2020 hematology follow-up visit: Reviewed above data with patient.  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  Given persistence of monoclonal light chain with mild renal dysfunction even if the cranial abnormality is just a venous lake, I would still complete work-up with bone marrow biopsy to ensure no plasma cell dyscrasia, myelodysplasia, aplasia.    -8/20/2020 bone marrow biopsy showed mild hypercellularity 33% with out atypia.  No increase in plasma cells.  No T-cell or B-cell clonality on flow.  Normal cytogenetics.  Hemoglobin 11.3 with platelets 123,000.  Hence low likelihood for plasma cell dyscrasia or myelodysplasia.    -9/3/2020 hematology follow-up visit: Reviewed results of bone marrow biopsy.  No hint of myelodysplasia or plasma cell dyscrasia or  myelophthisic process.  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    -4/26/2021 [facility] hematology oncology follow-up visit: I reviewed his 3/22/2021 data: White count 2810 with hemoglobin 11 with platelets 134,000 and absolute neutrophil count 1200.  CMP unremarkable with normal liver enzymes and creatinine 1.15 with normal calcium 9.0 and normal total protein and alkaline phosphatase.  No serum M spike and scant elevation of kappa light chain 37.7 with normal lambda all improved over the last year.  Normal quantitative immunoglobulins.  24-hour urine immunoelectrophoresis had no monoclonality.  Sedimentation rate 21 with C-reactive protein 0.91 both minimally elevated.  Ionized calcium just barely above normal 1.37.  Beta-2 microglobulin 3.0 stable since July.  The bulk of his pancytopenia is sequestered if portal hypertension.  No significant kappa light chains in the serum and he has gastric angio ectasias.  We will follow with Dr. Perez and I will repeat his M panel again in 1 year and if that is stable would probably discontinue routine M panel testing as I suspect this is just an inflammatory marker with rheumatoid arthritis.    -5/2/2022 [facility] Hematology clinic follow-up: Unfortunately Mr. Camejo did not get his labs prior to our appointment today.  We will check his M panel again today (serum free light chains, serum immunoelectrophoresis) along with CBC, CMP, sed rate and CRP.  Assuming he still has no abnormal protein and his CBC is stable then we will discontinue routine hematological follow-up as recommended at previous visit with Dr. Herrera on 4/26/2021.  I will call him  later this week with his lab results from today.  Addendum: 5/13/2022 phone call: No m spike.  Labs stable fu prn.    - 2/24/2023 [facility] hematology follow-up: 8/3/2022 sedimentation rate 45 C-reactive protein 4.47.  White count 2400 hemoglobin 11.1 platelets 119,000 unremarkable BMP save for creatinine 1.41.  Normal total protein, albumin globulin and ratios.  Normal liver enzymes.  2/10/2023 White count 3500 hemoglobin 11 platelets 113,000 with unremarkable differential sedimentation rate 45.  Uric acid mildly elevated 7.2.  I have put in a referral back to Dr. 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  His blood counts overall have been stable for the better part of the last 9 months and he will see us on an as-needed basis.    -3/6/2023 [facility] inpatient hematology oncology consult: Since last I saw the patient and before he could get back to Dr. Perez for further management of his pancytopenia and gastric antral vascular ectasia, he was admitted with increasing shortness of breath and cough with increasing abdominal girth and pulmonary edema.  On evaluation of this, 3/5/2023 CT angiogram of chest and CT abdomen pelvis done In the emergency room in addition to moderate CHF and gallbladder distention showed enlarged bilateral inguinal nodes most likely reactive.  I was asked to consult regarding this.  Ultimately need to know what these nodes are and whether the balanced elevation of his light chains could be related to underlying lymphoma which is a possibility albeit unlikely, ultimately we need tissue.  I have spoken with Dr. Coleman who understandably in the midst of his cardiac issues is hesitant to try to excavate an inguinal node surgically.  We will first try ultrasound-guided lymph node biopsy and see if that gives us any definitive answers.  My nurse practitioner will be covering for the next couple of days and I  will check back Thursday.     -3/7/2023 [facility] hematology oncology inpatient follow-up:  Patient had biopsy of left inguinal lymph node this morning and tolerated it well.  We will follow up on results when available.  If results are not definitive then may consider excisional biopsy in the future when respiratory and heart failure symptoms have improved.  Cardiology following.  KUB suggest possible ileus.  Gallbladder US showed gallbladder within normal limits.  Continues to have abdominal distention with fullness.  Plan for HIDA scan today.       -3/8/2023 [facility] hematology oncology inpatient follow-up:   Left inguinal lymph node biopsy from yesterday pending.  We will follow up on results.  If not definite may consider excisional biopsy.  GI symptoms seem to be improved.  GI following.  Addendum: Left inguinal lymph node needle core biopsy showed fragments of benign lymph node with dense fibrovascular tissue negative for malignancy.  KUB showed ileus but HIDA scan showed no cholecystitis but ejection fraction of 19% with hypofunctioning gallbladder.  They discharged him this day on Xifaxan and recommendation to follow-up with Dr. Perez.  Hemoglobin on the day of discharge 3/8/2023 was 9.5 stable with normal white count and platelets minimally decreased 126,000 and stable.    -3/17/2023 [facility] hematology oncology follow-up outpatient: Inguinal biopsy site healing.  No pathologic abnormalities on core biopsy.  With multiple comorbidities, there is some intrinsic risk to excisional biopsy and for now rather than pushing the issue to get Dr. Coleman to remove this note, I will just repeat his CT chest abdomen pelvis in a couple of months and in the meantime he has a follow-up with Dr. 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  Overall his hemoglobin is stable in the nines-tens with stable platelet count in the 120,000's.       Anemia   2/26/2020 Initial Diagnosis     "Pancytopenia (CMS/HCC)         HISTORY OF PRESENT ILLNESS:  The patient is a 78 y.o. male, here for follow up on management of chronic pancytopenia with probable portal hypertension and recent finding of inguinal adenopathy with benign biopsy    Past Medical History:   Diagnosis Date   • Arthritis    • Asthma    • Bowel trouble    • CHF (congestive heart failure) (HCC)    • Chondrocalcinosis of right knee    • Colitis    • COPD (chronic obstructive pulmonary disease) (HCC)    • Diabetes mellitus (HCC)    • Esophagitis    • Gout    • H/O bladder problems    • Heart murmur    • Hypertension    • IBS (irritable bowel syndrome)    • JONAH on CPAP    • Primary osteoarthritis of both knees    • Rheumatoid arthritis (HCC)    • Skin problem    • SOB (shortness of breath)      Past Surgical History:   Procedure Laterality Date   • COLONOSCOPY     • KNEE ARTHROSCOPY Left    • PARTIAL KNEE ARTHROPLASTY Left    • SKIN BIOPSY     • STOMACH SURGERY     • UPPER GASTROINTESTINAL ENDOSCOPY     • US GUIDED LYMPH NODE BIOPSY  3/7/2023   • VASECTOMY         Allergies   Allergen Reactions   • Lisinopril Swelling   • Benazepril Swelling       Family History and Social History reviewed and changed as necessary    REVIEW OF SYSTEM:   No new somatic complaints    PHYSICAL EXAM:  No pallor or icterus or jaundice.  No palpable hepatosplenomegaly.  He does have a protuberant abdomen though so it is a little hard to examine.  No palpable cervical axillary or inguinal adenopathy.    Vitals:    03/17/23 1015   BP: 135/58   Pulse: 71   Resp: 20   Temp: 98.4 °F (36.9 °C)   SpO2: 90%   Weight: 107 kg (235 lb)   Height: 172.7 cm (68\")     Vitals:    03/17/23 1015   PainSc: 0-No pain          ECOG score: 1           Vitals reviewed.    ECOG: (1) Restricted in Physically Strenuous Activity, Ambulatory & Able to Do Work of Light Nature    Lab Results   Component Value Date    HGB 9.5 (L) 03/08/2023    HCT 29.3 (L) 03/08/2023    MCV 92.4 03/08/2023    PLT " 126 (L) 03/08/2023    WBC 3.95 03/08/2023    NEUTROABS 2.83 03/07/2023    LYMPHSABS 0.75 03/07/2023    MONOSABS 0.51 03/07/2023    EOSABS 0.00 03/07/2023    BASOSABS 0.02 03/07/2023       Lab Results   Component Value Date    GLUCOSE 79 03/08/2023    BUN 26 (H) 03/08/2023    CREATININE 1.18 03/08/2023     03/08/2023    K 4.5 03/08/2023     03/08/2023    CO2 29.0 03/08/2023    CALCIUM 8.7 03/08/2023    PROTEINTOT 5.7 (L) 03/07/2023    ALBUMIN 3.4 (L) 03/07/2023    BILITOT 0.4 03/07/2023    ALKPHOS 63 03/07/2023    AST 16 03/07/2023    ALT 13 03/07/2023             ASSESSMENT & PLAN:  1.  Pancytopenia with combination of anemia renal disease, possible sequestration with the hepatomegaly/portal vein dilation/SMV dilation on February 2020 liver spleen ultrasound suggesting portal hypertension and possible contribution from the gastric vascular ectasias on EGD  elevation of serum kappa and lambda light chains and gastric angioectasias on 7/17/2020 EGD  2.  Chronic low back pain   3.  Chronic kidney disease  4.  Type 2 diabetes  5.  Hyperlipidemia  6.  Hypertension  7.  Hypogonadism  8.  Depression  9. COPD  10.  Obstructive sleep apnea  11.  Reflux   12.  Gout  13 osteoarthritis  14.  BPH with ED  15.  BMI 36  16.  Putative history of rheumatoid arthritis  17.  Chronic granulomatous lung nodules on CT chest screening with routine follow-up recommended  18.  Scant elevation of serum light chains without intact monoclonal gammopathy and no plasma cell dyscrasia on bone marrow biopsy August 2020  19.  Inguinal adenopathy    Hematology history:  -Longstanding history of heme positive stools dating back to the late 80s with at least 5 colonoscopies by Dr. Perez including the last 1/2018 as well as EGDs and a capsule endoscopy that apparently showed scattered blood that they could not do much about.In the [facility] we have hemoglobins of 9.8 and platelet of 121,000 with white count 4880 as of June 2016 and  December 2015 white count 3020 with platelets 126,000 hemoglobin 11.4.  Does have a history of heavy alcohol use but none in the past several years.  -8/7/2019 Hemoccult negative  -1/30/2020 reticulocyte count 1.4%.  B12 618.  Iron slightly low 48.  White count 2700 with hemoglobin 11.6 MCV 88 and normal red cell distribution width with platelets 124,000.  Normal thyroid functions.  -2/4/2020 white count 3100 hemoglobin 11.7 with normal MCV and red cell distribution with platelets 126,000 normal liver enzymes and bilirubin including fractionation.  Creatinine 1.36 GFR greater than 60 with normal calcium 9.1.  .  C-reactive protein 9.67 with sedimentation rate elevated at 40 as well.  Was started on iron with vitamin C.    -2/27/2020 [facility]: He had dark tarry stools predating the institution of his current iron and extensive prior endoscopic work-up as outlined above.  I will check his methylmalonic acid and homocystine along with repeat B12 and folate and with his elevated C-reactive protein and sedimentation rate will check a serum immunoelectrophoresis and light chains.  Given his prior drinking history despite the fact that his liver enzymes have been normal I strongly suspect portal hypertension and I suspect the GI bleeding may be related to this but we will get records from .  If we do not get answers from this then we will proceed with bone marrow biopsy.  If there is a monoclonal gammopathy we will also proceed with bone marrow biopsy.  With his putative history of rheumatoid arthritis he could have splenomegaly with Felty syndrome as well and I will check his ROSARIO and rheumatoid factor.    -2/27/20 data:  Hgb 11.4 o/w normal CBC  Periph. Smear mild hypochromic anemia with mature WBC's    ZACKARY neg  Haptogb 213 high  Retic 2 %    Nl bili direct and indirect  Urine hemosiderin negative  Plasma free hemoglobin normal 7  G6PD 279 normal    ROSARIO neg  RF <10    Epo  22.4    Cr 1.6     nl  B12>2k  Homocysteine 10.5  Folate >20    Liver Spleen US:  Portal vein 1.7cm dilated  Smv Dilated 1.3 cm  But nl spleen size  Hepatomegaly with abnormal liver echogenicity.    Serum K 57.9, lambda 30.7, ratio 1.89.  Serum M spike zero    -5/26/2020 CT chest low-dose without contrast shows chronic granulomatous nodules lung RADS 2 with recommended annual follow-up.  Increased prominence of mucosal thickening distal esophagus compared to 2017 suggestive of esophagitis.    -7/17/2020 gastric angioma ectasias on EGD with Dr. Perez.  No esophageal abnormalities to corroborate with the above CT findings.    -7/27/2020 data:  White count 3600 with hemoglobin 11.6 platelets 137,000.  Creatinine 1.3 with GFR 65  Beta-2 microglobulin 3.2, upper limit 2.2  C-reactive protein 0.49/sedimentation rate 24  Serum immunoelectrophoresis showed no monoclonal spike  Serum free kappa light chains 52.8 with ratio of 2.25  Urine kappa light chains normal 41 with lambda normal 6 and ratio normal at 6  Bone survey essentially negative for myeloma save for a small minor posterior calvarial abnormality most likely venous lake.  Coincidental cardiomegaly with mild pulmonary vascular congestion.      -8/4/2020 data:   Sedimentation rate 37  White count 3100 hemoglobin 11.7 with platelets 115,000  Creatinine 1.27 with GFR greater than 60 and calcium 9.1 normal with normal total protein and albumin    -8/17/2020 hematology follow-up visit: Reviewed above data with patient.  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  Given persistence of monoclonal light chain with mild renal dysfunction even if the cranial abnormality is just a venous lake, I would still complete work-up with bone marrow biopsy to  ensure no plasma cell dyscrasia, myelodysplasia, aplasia.    -8/20/2020 bone marrow biopsy showed mild hypercellularity 33% with out atypia.  No increase in plasma cells.  No T-cell or B-cell clonality on flow.  Normal cytogenetics.  Hemoglobin 11.3 with platelets 123,000.  Hence low likelihood for plasma cell dyscrasia or myelodysplasia.    -9/3/2020 hematology follow-up visit: Reviewed results of bone marrow biopsy.  No hint of myelodysplasia or plasma cell dyscrasia or myelophthisic process.  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    -4/26/2021 [facility] hematology oncology follow-up visit: I reviewed his 3/22/2021 data: White count 2810 with hemoglobin 11 with platelets 134,000 and absolute neutrophil count 1200.  CMP unremarkable with normal liver enzymes and creatinine 1.15 with normal calcium 9.0 and normal total protein and alkaline phosphatase.  No serum M spike and scant elevation of kappa light chain 37.7 with normal lambda all improved over the last year.  Normal quantitative immunoglobulins.  24-hour urine immunoelectrophoresis had no monoclonality.  Sedimentation rate 21 with C-reactive protein 0.91 both minimally elevated.  Ionized calcium just barely above normal 1.37.  Beta-2 microglobulin 3.0 stable since July.  The bulk of his pancytopenia is sequestered if portal hypertension.  No significant kappa light chains in the serum and he has gastric angio ectasias.  We will follow with Dr. Perez and I will repeat his M panel again in 1 year and if that is stable would probably discontinue routine M panel testing as I suspect this is just an inflammatory marker with rheumatoid  arthritis.    -5/2/2022 [facility] Hematology clinic follow-up: Unfortunately Mr. Camejo did not get his labs prior to our appointment today.  We will check his M panel again today (serum free light chains, serum immunoelectrophoresis) along with CBC, CMP, sed rate and CRP.  Assuming he still has no abnormal protein and his CBC is stable then we will discontinue routine hematological follow-up as recommended at previous visit with Dr. Herrera on 4/26/2021.  I will call him later this week with his lab results from today.  Addendum: 5/13/2022 phone call: No m spike.  Labs stable fu prn.    - 2/24/2023 [facility] hematology follow-up: 8/3/2022 sedimentation rate 45 C-reactive protein 4.47.  White count 2400 hemoglobin 11.1 platelets 119,000 unremarkable BMP save for creatinine 1.41.  Normal total protein, albumin globulin and ratios.  Normal liver enzymes.  2/10/2023 White count 3500 hemoglobin 11 platelets 113,000 with unremarkable differential sedimentation rate 45.  Uric acid mildly elevated 7.2.  I have put in a referral back to Dr. 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  His blood counts overall have been stable for the better part of the last 9 months and he will see us on an as-needed basis.    -3/6/2023 [facility] inpatient hematology oncology consult: Since last I saw the patient and before he could get back to Dr. Perez for further management of his pancytopenia and gastric antral vascular ectasia, he was admitted with increasing shortness of breath and cough with increasing abdominal girth and pulmonary edema.  On evaluation of this, 3/5/2023 CT angiogram of chest and CT abdomen pelvis done In the emergency room in addition to moderate CHF and gallbladder distention showed enlarged bilateral inguinal nodes most likely reactive.  I was asked to consult regarding this.  Ultimately need to know what these nodes are and whether  the balanced elevation of his light chains could be related to underlying lymphoma which is a possibility albeit unlikely, ultimately we need tissue.  I have spoken with Dr. Coleman who understandably in the midst of his cardiac issues is hesitant to try to excavate an inguinal node surgically.  We will first try ultrasound-guided lymph node biopsy and see if that gives us any definitive answers.  My nurse practitioner will be covering for the next couple of days and I will check back Thursday.     -3/7/2023 [facility], [place], operated by [facility] hematology oncology inpatient follow-up:  Patient had biopsy of left inguinal lymph node this morning and tolerated it well.  We will follow up on results when available.  If results are not definitive then may consider excisional biopsy in the future when respiratory and heart failure symptoms have improved.  Cardiology following.  KUB suggest possible ileus.  Gallbladder US showed gallbladder within normal limits.  Continues to have abdominal distention with fullness.  Plan for HIDA scan today.       -3/8/2023 [facility], [place], operated by [facility] hematology oncology inpatient follow-up:   Left inguinal lymph node biopsy from yesterday pending.  We will follow up on results.  If not definite may consider excisional biopsy.  GI symptoms seem to be improved.  GI following.  Addendum: Left inguinal lymph node needle core biopsy showed fragments of benign lymph node with dense fibrovascular tissue negative for malignancy.  KUB showed ileus but HIDA scan showed no cholecystitis but ejection fraction of 19% with hypofunctioning gallbladder.  They discharged him this day on Xifaxan and recommendation to follow-up with Dr. Perez.  Hemoglobin on the day of discharge 3/8/2023 was 9.5 stable with normal white count and platelets minimally decreased 126,000 and stable.    -3/17/2023 [facility], [place], operated by [facility] hematology oncology follow-up outpatient: Inguinal biopsy site healing.  No pathologic abnormalities on core biopsy.  With multiple comorbidities, there is  some intrinsic risk to excisional biopsy and for now rather than pushing the issue to get Dr. Coleman to remove this note, I will just repeat his CT chest abdomen pelvis in a couple of months and in the meantime he has a follow-up with Dr. 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  Overall his hemoglobin is stable in the nines-tens with stable platelet count in the 120,000's.    Total time of care today inclusive of time spent today prior to his arrival reviewing my nurse practitioner notes from March 7 and March 8 and interval results of biopsies and additional testing including HIDA scan done while he is in the hospital and during this visit translating this information to him and the plan as outlined above and communicating this to his surgeon as well as to Dr. Perez took 50 minutes of patient care time throughout the day today.  Yuval Herrera MD    03/17/2023

## 2023-03-20 ENCOUNTER — READMISSION MANAGEMENT (OUTPATIENT)
Dept: CALL CENTER | Facility: HOSPITAL | Age: 78
End: 2023-03-20
Payer: MEDICARE

## 2023-03-20 NOTE — OUTREACH NOTE
CHF Week 2 Survey    Flowsheet Row Responses   [facility] patient discharged from? [facility]   Does the patient have one of the following disease processes/diagnoses(primary or secondary)? CHF   Week 2 attempt successful? Yes   Call start time 1712   Call end time 1719   Discharge diagnosis Acute on chronic congestive heart failure   Meds reviewed with patient/caregiver? Yes   Is the patient having any side effects they believe may be caused by any medication additions or changes? No   Does the patient have all medications ordered at discharge? Yes   Is the patient taking all medications as directed (includes completed medication regime)? Yes   Does the patient have a primary care provider?  Yes   Does the patient have an appointment with their PCP within 7 days of discharge? Yes   Has the patient kept scheduled appointments due by today? Yes   What is the Home health agency?  [facility]    Has home health visited the patient within 72 hours of discharge? Yes   DME comments Night O2 and prn during day,  wearing 2-4L   Pulse Ox monitoring Intermittent   Pulse Ox device source Patient   O2 Sat comments 91% RA   O2 Sat: education provided Sat levels, Monitoring frequency, When to seek care   Psychosocial issues? No   Comments pt reports cough remains, has difficulty time producing phlegm. LE edema is relieved with compression stockings.Monitoring daily wts.   Did the patient receive a copy of their discharge instructions? Yes   What is the patient's perception of their health status since discharge? Improving   Nursing interventions Nurse provided patient education   Is the patient able to teach back signs and symptoms of worsening condition? (i.e. weight gain, shortness of air, etc.) Yes   Is the patient/caregiver able to teach back the hierarchy of who to call/visit for symptoms/problems? PCP, Specialist, Home health nurse, Urgent Care, ED, 911 Yes   CHF Zone this Call Yellow Zone   Yellow  Zone Not feeling as good as usual, Dry, hacking cough, Worsening shortness of breath with activity   CHF Week 2 call completed? Yes   Is the patient interested in additional calls from an ambulatory ?  NOTE:  applies to high risk patients requiring additional follow-up. No   Call end time 2932          Rosina MONTERROSO - Registered Nurse

## 2023-05-08 ENCOUNTER — TELEPHONE (OUTPATIENT)
Dept: CARDIOLOGY | Facility: HOSPITAL | Age: 78
End: 2023-05-08
Payer: MEDICARE

## 2023-05-08 NOTE — TELEPHONE ENCOUNTER
----- Message from Carina Garcia CMA sent at 5/8/2023 12:51 PM EDT -----  Patient c/o dry, hacking cough and SOA for a little over a week. States that he has slight edema in the legs and feet and in weighing in around 228. He does have an appointment tomorrow with his primary cardiologist- Dr. Mirza at [facility].

## 2023-05-18 ENCOUNTER — LAB (OUTPATIENT)
Dept: LAB | Facility: HOSPITAL | Age: 78
End: 2023-05-18
Payer: MEDICARE

## 2023-05-18 ENCOUNTER — OFFICE VISIT (OUTPATIENT)
Dept: ONCOLOGY | Facility: CLINIC | Age: 78
End: 2023-05-18
Payer: MEDICARE

## 2023-05-18 VITALS
BODY MASS INDEX: 35.31 KG/M2 | OXYGEN SATURATION: 94 % | TEMPERATURE: 98.9 F | RESPIRATION RATE: 18 BRPM | DIASTOLIC BLOOD PRESSURE: 69 MMHG | SYSTOLIC BLOOD PRESSURE: 145 MMHG | HEART RATE: 71 BPM | HEIGHT: 68 IN | WEIGHT: 233 LBS

## 2023-05-18 DIAGNOSIS — D64.9 ANEMIA, UNSPECIFIED TYPE: Primary | ICD-10-CM

## 2023-05-18 DIAGNOSIS — R59.0 INGUINAL LYMPHADENOPATHY: ICD-10-CM

## 2023-05-18 DIAGNOSIS — R59.1 LYMPHADENOPATHY: ICD-10-CM

## 2023-05-18 DIAGNOSIS — D64.9 ANEMIA, UNSPECIFIED TYPE: ICD-10-CM

## 2023-05-18 LAB
ALBUMIN SERPL-MCNC: 3.9 G/DL (ref 3.5–5.2)
ALBUMIN/GLOB SERPL: 1.3 G/DL
ALP SERPL-CCNC: 75 U/L (ref 39–117)
ALT SERPL W P-5'-P-CCNC: 21 U/L (ref 1–41)
ANION GAP SERPL CALCULATED.3IONS-SCNC: 8 MMOL/L (ref 5–15)
AST SERPL-CCNC: 16 U/L (ref 1–40)
BASOPHILS # BLD AUTO: 0.06 10*3/MM3 (ref 0–0.2)
BASOPHILS NFR BLD AUTO: 1.5 % (ref 0–1.5)
BILIRUB SERPL-MCNC: 0.4 MG/DL (ref 0–1.2)
BUN SERPL-MCNC: 32 MG/DL (ref 8–23)
BUN/CREAT SERPL: 21.1 (ref 7–25)
CALCIUM SPEC-SCNC: 9 MG/DL (ref 8.6–10.5)
CHLORIDE SERPL-SCNC: 111 MMOL/L (ref 98–107)
CO2 SERPL-SCNC: 25 MMOL/L (ref 22–29)
CREAT SERPL-MCNC: 1.52 MG/DL (ref 0.76–1.27)
DEPRECATED RDW RBC AUTO: 45.3 FL (ref 37–54)
EGFRCR SERPLBLD CKD-EPI 2021: 46.6 ML/MIN/1.73
EOSINOPHIL # BLD AUTO: 0.49 10*3/MM3 (ref 0–0.4)
EOSINOPHIL NFR BLD AUTO: 12.5 % (ref 0.3–6.2)
ERYTHROCYTE [DISTWIDTH] IN BLOOD BY AUTOMATED COUNT: 12.9 % (ref 12.3–15.4)
GLOBULIN UR ELPH-MCNC: 2.9 GM/DL
GLUCOSE SERPL-MCNC: 99 MG/DL (ref 65–99)
HCT VFR BLD AUTO: 30.5 % (ref 37.5–51)
HGB BLD-MCNC: 9.8 G/DL (ref 13–17.7)
IMM GRANULOCYTES # BLD AUTO: 0.02 10*3/MM3 (ref 0–0.05)
IMM GRANULOCYTES NFR BLD AUTO: 0.5 % (ref 0–0.5)
LYMPHOCYTES # BLD AUTO: 0.71 10*3/MM3 (ref 0.7–3.1)
LYMPHOCYTES NFR BLD AUTO: 18.1 % (ref 19.6–45.3)
MCH RBC QN AUTO: 30.5 PG (ref 26.6–33)
MCHC RBC AUTO-ENTMCNC: 32.1 G/DL (ref 31.5–35.7)
MCV RBC AUTO: 95 FL (ref 79–97)
MONOCYTES # BLD AUTO: 0.54 10*3/MM3 (ref 0.1–0.9)
MONOCYTES NFR BLD AUTO: 13.7 % (ref 5–12)
NEUTROPHILS NFR BLD AUTO: 2.11 10*3/MM3 (ref 1.7–7)
NEUTROPHILS NFR BLD AUTO: 53.7 % (ref 42.7–76)
PLATELET # BLD AUTO: 146 10*3/MM3 (ref 140–450)
PMV BLD AUTO: 9.7 FL (ref 6–12)
POTASSIUM SERPL-SCNC: 5.1 MMOL/L (ref 3.5–5.2)
PROT SERPL-MCNC: 6.8 G/DL (ref 6–8.5)
RBC # BLD AUTO: 3.21 10*6/MM3 (ref 4.14–5.8)
SODIUM SERPL-SCNC: 144 MMOL/L (ref 136–145)
WBC NRBC COR # BLD: 3.93 10*3/MM3 (ref 3.4–10.8)

## 2023-05-18 PROCEDURE — 36415 COLL VENOUS BLD VENIPUNCTURE: CPT

## 2023-05-18 PROCEDURE — 85025 COMPLETE CBC W/AUTO DIFF WBC: CPT

## 2023-05-18 PROCEDURE — 3077F SYST BP >= 140 MM HG: CPT | Performed by: NURSE PRACTITIONER

## 2023-05-18 PROCEDURE — 1126F AMNT PAIN NOTED NONE PRSNT: CPT | Performed by: NURSE PRACTITIONER

## 2023-05-18 PROCEDURE — 99213 OFFICE O/P EST LOW 20 MIN: CPT | Performed by: NURSE PRACTITIONER

## 2023-05-18 PROCEDURE — 80053 COMPREHEN METABOLIC PANEL: CPT

## 2023-05-18 PROCEDURE — 3078F DIAST BP <80 MM HG: CPT | Performed by: NURSE PRACTITIONER

## 2023-05-18 RX ORDER — HYDRALAZINE HYDROCHLORIDE 50 MG/1
TABLET, FILM COATED ORAL
COMMUNITY
Start: 2023-04-07

## 2023-05-18 RX ORDER — ALLOPURINOL 300 MG/1
TABLET ORAL
COMMUNITY
Start: 2023-05-05

## 2023-05-18 RX ORDER — OXYBUTYNIN CHLORIDE 5 MG/1
TABLET, EXTENDED RELEASE ORAL
COMMUNITY
Start: 2023-05-02

## 2023-05-18 NOTE — PROGRESS NOTES
CHIEF COMPLAINT: No new somatic complaints.      Problem List:  Oncology/Hematology History Overview Note   1.  Pancytopenia with combination of anemia renal disease, possible sequestration with the hepatomegaly/portal vein dilation/SMV dilation on February 2020 liver spleen ultrasound suggesting portal hypertension and possible contribution from the gastric vascular ectasias on EGD  elevation of serum kappa and lambda light chains and gastric angioectasias on 7/17/2020 EGD  2.  Chronic low back pain   3.  Chronic kidney disease  4.  Type 2 diabetes  5.  Hyperlipidemia  6.  Hypertension  7.  Hypogonadism  8.  Depression  9. COPD  10.  Obstructive sleep apnea  11.  Reflux   12.  Gout  13 osteoarthritis  14.  BPH with ED  15.  BMI 36  16.  Putative history of rheumatoid arthritis  17.  Chronic granulomatous lung nodules on CT chest screening with routine follow-up recommended  18.  Scant elevation of serum light chains without intact monoclonal gammopathy and no plasma cell dyscrasia on bone marrow biopsy August 2020  19.  Inguinal adenopathy    Hematology history:  -Longstanding history of heme positive stools dating back to the late 80s with at least 5 colonoscopies by Dr. Perez including the last 1/2018 as well as EGDs and a capsule endoscopy that apparently showed scattered blood that they could not do much about.In the [facility] we have hemoglobins of 9.8 and platelet of 121,000 with white count 4880 as of June 2016 and December 2015 white count 3020 with platelets 126,000 hemoglobin 11.4.  Does have a history of heavy alcohol use but none in the past several years.  -8/7/2019 Hemoccult negative  -1/30/2020 reticulocyte count 1.4%.  B12 618.  Iron slightly low 48.  White count 2700 with hemoglobin 11.6 MCV 88 and normal red cell distribution width with platelets 124,000.  Normal thyroid functions.  -2/4/2020 white count 3100 hemoglobin 11.7 with normal MCV and red cell distribution with platelets 126,000  normal liver enzymes and bilirubin including fractionation.  Creatinine 1.36 GFR greater than 60 with normal calcium 9.1.  .  C-reactive protein 9.67 with sedimentation rate elevated at 40 as well.  Was started on iron with vitamin C.    -2/27/2020 initial [facility] hematology consultation: He had dark tarry stools predating the institution of his current iron and extensive prior endoscopic work-up as outlined above.  I will check his methylmalonic acid and homocystine along with repeat B12 and folate and with his elevated C-reactive protein and sedimentation rate will check a serum immunoelectrophoresis and light chains.  Given his prior drinking history despite the fact that his liver enzymes have been normal I strongly suspect portal hypertension and I suspect the GI bleeding may be related to this but we will get records from .  If we do not get answers from this then we will proceed with bone marrow biopsy.  If there is a monoclonal gammopathy we will also proceed with bone marrow biopsy.  With his putative history of rheumatoid arthritis he could have splenomegaly with Felty syndrome as well and I will check his ROSARIO and rheumatoid factor.    -2/27/20 data:  Hgb 11.4 o/w normal CBC  Periph. Smear mild hypochromic anemia with mature WBC's    ZACKARY neg  Haptogb 213 high  Retic 2 %    Nl bili direct and indirect  Urine hemosiderin negative  Plasma free hemoglobin normal 7  G6PD 279 normal    ROSARIO neg  RF <10    Epo 22.4    Cr 1.6     nl  B12>2k  Homocysteine 10.5  Folate >20    Liver Spleen US:  Portal vein 1.7cm dilated  Smv Dilated 1.3 cm  But nl spleen size  Hepatomegaly with abnormal liver echogenicity.    Serum K 57.9, lambda 30.7, ratio 1.89.  Serum M spike zero    -5/26/2020 CT chest low-dose without contrast shows chronic granulomatous nodules lung RADS 2 with recommended annual follow-up.  Increased prominence of mucosal thickening distal esophagus compared to 2017 suggestive of  esophagitis.    -7/17/2020 gastric angioma ectasias on EGD with Dr. Perez.  No esophageal abnormalities to corroborate with the above CT findings.    -7/27/2020 data:  White count 3600 with hemoglobin 11.6 platelets 137,000.  Creatinine 1.3 with GFR 65  Beta-2 microglobulin 3.2, upper limit 2.2  C-reactive protein 0.49/sedimentation rate 24  Serum immunoelectrophoresis showed no monoclonal spike  Serum free kappa light chains 52.8 with ratio of 2.25  Urine kappa light chains normal 41 with lambda normal 6 and ratio normal at 6  Bone survey essentially negative for myeloma save for a small minor posterior calvarial abnormality most likely venous lake.  Coincidental cardiomegaly with mild pulmonary vascular congestion.      -8/4/2020 data:   Sedimentation rate 37  White count 3100 hemoglobin 11.7 with platelets 115,000  Creatinine 1.27 with GFR greater than 60 and calcium 9.1 normal with normal total protein and albumin    -8/17/2020 hematology follow-up visit: Reviewed above data with patient.  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  Given persistence of monoclonal light chain with mild renal dysfunction even if the cranial abnormality is just a venous lake, I would still complete work-up with bone marrow biopsy to ensure no plasma cell dyscrasia, myelodysplasia, aplasia.    -8/20/2020 bone marrow biopsy showed mild hypercellularity 33% with out atypia.  No increase in plasma cells.  No T-cell or B-cell clonality on flow.  Normal cytogenetics.  Hemoglobin 11.3 with platelets 123,000.  Hence low likelihood for plasma cell dyscrasia or myelodysplasia.    -9/3/2020 hematology follow-up visit: Reviewed results of bone marrow biopsy.  No hint of myelodysplasia or plasma cell dyscrasia or myelophthisic  process.  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    -4/26/2021 [facility] [place] operated by [facility] hematology oncology follow-up visit: I reviewed his 3/22/2021 data: White count 2810 with hemoglobin 11 with platelets 134,000 and absolute neutrophil count 1200.  CMP unremarkable with normal liver enzymes and creatinine 1.15 with normal calcium 9.0 and normal total protein and alkaline phosphatase.  No serum M spike and scant elevation of kappa light chain 37.7 with normal lambda all improved over the last year.  Normal quantitative immunoglobulins.  24-hour urine immunoelectrophoresis had no monoclonality.  Sedimentation rate 21 with C-reactive protein 0.91 both minimally elevated.  Ionized calcium just barely above normal 1.37.  Beta-2 microglobulin 3.0 stable since July.  The bulk of his pancytopenia is sequestered if portal hypertension.  No significant kappa light chains in the serum and he has gastric angio ectasias.  We will follow with Dr. Perez and I will repeat his M panel again in 1 year and if that is stable would probably discontinue routine M panel testing as I suspect this is just an inflammatory marker with rheumatoid arthritis.    -5/2/2022 [facility], [place] Hematology clinic follow-up: Unfortunately Mr. Camejo did not get his labs prior to our appointment today.  We will check his M panel again today (serum free light chains, serum immunoelectrophoresis) along with CBC, CMP, sed rate and CRP.  Assuming he still has no abnormal protein and his CBC is stable then we will discontinue routine hematological follow-up as recommended at previous visit with Dr. Herrera on 4/26/2021.  I will call him later this week  with his lab results from today.  Addendum: 5/13/2022 phone call: No m spike.  Labs stable fu prn.    - 2/24/2023 [facility] hematology follow-up: 8/3/2022 sedimentation rate 45 C-reactive protein 4.47.  White count 2400 hemoglobin 11.1 platelets 119,000 unremarkable BMP save for creatinine 1.41.  Normal total protein, albumin globulin and ratios.  Normal liver enzymes.  2/10/2023 White count 3500 hemoglobin 11 platelets 113,000 with unremarkable differential sedimentation rate 45.  Uric acid mildly elevated 7.2.  I have put in a referral back to Dr. 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  His blood counts overall have been stable for the better part of the last 9 months and he will see us on an as-needed basis.    -3/6/2023 [facility] inpatient hematology oncology consult: Since last I saw the patient and before he could get back to Dr. Perez for further management of his pancytopenia and gastric antral vascular ectasia, he was admitted with increasing shortness of breath and cough with increasing abdominal girth and pulmonary edema.  On evaluation of this, 3/5/2023 CT angiogram of chest and CT abdomen pelvis done In the emergency room in addition to moderate CHF and gallbladder distention showed enlarged bilateral inguinal nodes most likely reactive.  I was asked to consult regarding this.  Ultimately need to know what these nodes are and whether the balanced elevation of his light chains could be related to underlying lymphoma which is a possibility albeit unlikely, ultimately we need tissue.  I have spoken with Dr. Coleman who understandably in the midst of his cardiac issues is hesitant to try to excavate an inguinal node surgically.  We will first try ultrasound-guided lymph node biopsy and see if that gives us any definitive answers.  My nurse practitioner will be covering for the next couple of days and I will check back  Thursday.     -3/7/2023 [facility] hematology oncology inpatient follow-up:  Patient had biopsy of left inguinal lymph node this morning and tolerated it well.  We will follow up on results when available.  If results are not definitive then may consider excisional biopsy in the future when respiratory and heart failure symptoms have improved.  Cardiology following.  KUB suggest possible ileus.  Gallbladder US showed gallbladder within normal limits.  Continues to have abdominal distention with fullness.  Plan for HIDA scan today.       -3/8/2023 [facility] hematology oncology inpatient follow-up:   Left inguinal lymph node biopsy from yesterday pending.  We will follow up on results.  If not definite may consider excisional biopsy.  GI symptoms seem to be improved.  GI following.  Addendum: Left inguinal lymph node needle core biopsy showed fragments of benign lymph node with dense fibrovascular tissue negative for malignancy.  KUB showed ileus but HIDA scan showed no cholecystitis but ejection fraction of 19% with hypofunctioning gallbladder.  They discharged him this day on Xifaxan and recommendation to follow-up with Dr. Perez.  Hemoglobin on the day of discharge 3/8/2023 was 9.5 stable with normal white count and platelets minimally decreased 126,000 and stable.    -3/17/2023 [facility] hematology oncology follow-up outpatient: Inguinal biopsy site healing.  No pathologic abnormalities on core biopsy.  With multiple comorbidities, there is some intrinsic risk to excisional biopsy and for now rather than pushing the issue to get Dr. Coleman to remove this note, I will just repeat his CT chest abdomen pelvis in a couple of months and in the meantime he has a follow-up with Dr. 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  Overall his hemoglobin is stable in the nines-tens with stable platelet count in the 120,000's.       Anemia   2/26/2020 Initial Diagnosis    Pancytopenia  "(CMS/[facility])         HISTORY OF PRESENT ILLNESS:  The patient is a 78 y.o. male, here for follow up on management of chronic pancytopenia with probable portal hypertension and recent finding of inguinal adenopathy with benign biopsy.  He had a stress test yesterday and has been following with cardiology due to complaints of intermittent chest pressure.  He complains of intermittent constipation that is resolved with Linzess.    Past Medical History:   Diagnosis Date   • Arthritis    • Asthma    • Bowel trouble    • CHF (congestive heart failure)    • Chondrocalcinosis of right knee    • Colitis    • COPD (chronic obstructive pulmonary disease)    • Diabetes mellitus    • Esophagitis    • Gout    • H/O bladder problems    • Heart murmur    • Hypertension    • IBS (irritable bowel syndrome)    • JONAH on CPAP    • Primary osteoarthritis of both knees    • Rheumatoid arthritis    • Skin problem    • SOB (shortness of breath)      Past Surgical History:   Procedure Laterality Date   • COLONOSCOPY     • KNEE ARTHROSCOPY Left    • PARTIAL KNEE ARTHROPLASTY Left    • SKIN BIOPSY     • STOMACH SURGERY     • UPPER GASTROINTESTINAL ENDOSCOPY     • US GUIDED LYMPH NODE BIOPSY  3/7/2023   • VASECTOMY         Allergies   Allergen Reactions   • Lisinopril Swelling   • Benazepril Swelling       Family History and Social History reviewed and changed as necessary    REVIEW OF SYSTEM:   No new somatic complaints    PHYSICAL EXAM:  No pallor or icterus or jaundice.    No palpable cervical axillary or inguinal adenopathy.  Heart rate rhythm regular  Respirations even and unlabored on 3 L supplemental oxygen    Vitals:    05/18/23 1249   BP: 145/69   Pulse: 71   Resp: 18   Temp: 98.9 °F (37.2 °C)   SpO2: 94%   Weight: 106 kg (233 lb)   Height: 172.7 cm (68\")     Vitals:    05/18/23 1249   PainSc: 0-No pain          ECOG score: 1           Vitals reviewed.    ECOG: (1) Restricted in Physically Strenuous Activity, Ambulatory & Able to Do Work " of [employer]    Lab Results   Component Value Date    HGB 9.8 (L) 05/18/2023    HCT 30.5 (L) 05/18/2023    MCV 95.0 05/18/2023     05/18/2023    WBC 3.93 05/18/2023    NEUTROABS 2.11 05/18/2023    LYMPHSABS 0.71 05/18/2023    MONOSABS 0.54 05/18/2023    EOSABS 0.49 (H) 05/18/2023    BASOSABS 0.06 05/18/2023       Lab Results   Component Value Date    GLUCOSE 99 05/18/2023    BUN 32 (H) 05/18/2023    CREATININE 1.52 (H) 05/18/2023     05/18/2023    K 5.1 05/18/2023     (H) 05/18/2023    CO2 25.0 05/18/2023    CALCIUM 9.0 05/18/2023    PROTEINTOT 6.8 05/18/2023    ALBUMIN 3.9 05/18/2023    BILITOT 0.4 05/18/2023    ALKPHOS 75 05/18/2023    AST 16 05/18/2023    ALT 21 05/18/2023             ASSESSMENT & PLAN:  1.  Pancytopenia with combination of anemia renal disease, possible sequestration with the hepatomegaly/portal vein dilation/SMV dilation on February 2020 liver spleen ultrasound suggesting portal hypertension and possible contribution from the gastric vascular ectasias on EGD  elevation of serum kappa and lambda light chains and gastric angioectasias on 7/17/2020 EGD  2.  Chronic low back pain   3.  Chronic kidney disease  4.  Type 2 diabetes  5.  Hyperlipidemia  6.  Hypertension  7.  Hypogonadism  8.  Depression  9. COPD  10.  Obstructive sleep apnea  11.  Reflux   12.  Gout  13 osteoarthritis  14.  BPH with ED  15.  BMI 36  16.  Putative history of rheumatoid arthritis  17.  Chronic granulomatous lung nodules on CT chest screening with routine follow-up recommended  18.  Scant elevation of serum light chains without intact monoclonal gammopathy and no plasma cell dyscrasia on bone marrow biopsy August 2020  19.  Inguinal adenopathy    Hematology history:  -Longstanding history of heme positive stools dating back to the late 80s with at least 5 colonoscopies by Dr. Perez including the last 1/2018 as well as EGDs and a capsule endoscopy that apparently showed scattered blood that they could  not do much about.In the [facility] system we have hemoglobins of 9.8 and platelet of 121,000 with white count 4880 as of June 2016 and December 2015 white count 3020 with platelets 126,000 hemoglobin 11.4.  Does have a history of heavy alcohol use but none in the past several years.  -8/7/2019 Hemoccult negative  -1/30/2020 reticulocyte count 1.4%.  B12 618.  Iron slightly low 48.  White count 2700 with hemoglobin 11.6 MCV 88 and normal red cell distribution width with platelets 124,000.  Normal thyroid functions.  -2/4/2020 white count 3100 hemoglobin 11.7 with normal MCV and red cell distribution with platelets 126,000 normal liver enzymes and bilirubin including fractionation.  Creatinine 1.36 GFR greater than 60 with normal calcium 9.1.  .  C-reactive protein 9.67 with sedimentation rate elevated at 40 as well.  Was started on iron with vitamin C.    -2/27/2020 initial [facility] hematology consultation: He had dark tarry stools predating the institution of his current iron and extensive prior endoscopic work-up as outlined above.  I will check his methylmalonic acid and homocystine along with repeat B12 and folate and with his elevated C-reactive protein and sedimentation rate will check a serum immunoelectrophoresis and light chains.  Given his prior drinking history despite the fact that his liver enzymes have been normal I strongly suspect portal hypertension and I suspect the GI bleeding may be related to this but we will get records from .  If we do not get answers from this then we will proceed with bone marrow biopsy.  If there is a monoclonal gammopathy we will also proceed with bone marrow biopsy.  With his putative history of rheumatoid arthritis he could have splenomegaly with Felty syndrome as well and I will check his ROSARIO and rheumatoid factor.    -2/27/20 data:  Hgb 11.4 o/w normal CBC  Periph. Smear mild hypochromic anemia with mature WBC's    ZACKARY neg  Haptogb 213 high  Retic 2 %    Nl  bili direct and indirect  Urine hemosiderin negative  Plasma free hemoglobin normal 7  G6PD 279 normal    ROSARIO neg  RF <10    Epo 22.4    Cr 1.6     nl  B12>2k  Homocysteine 10.5  Folate >20    Liver Spleen US:  Portal vein 1.7cm dilated  Smv Dilated 1.3 cm  But nl spleen size  Hepatomegaly with abnormal liver echogenicity.    Serum K 57.9, lambda 30.7, ratio 1.89.  Serum M spike zero    -5/26/2020 CT chest low-dose without contrast shows chronic granulomatous nodules lung RADS 2 with recommended annual follow-up.  Increased prominence of mucosal thickening distal esophagus compared to 2017 suggestive of esophagitis.    -7/17/2020 gastric angioma ectasias on EGD with Dr. Perez.  No esophageal abnormalities to corroborate with the above CT findings.    -7/27/2020 data:  White count 3600 with hemoglobin 11.6 platelets 137,000.  Creatinine 1.3 with GFR 65  Beta-2 microglobulin 3.2, upper limit 2.2  C-reactive protein 0.49/sedimentation rate 24  Serum immunoelectrophoresis showed no monoclonal spike  Serum free kappa light chains 52.8 with ratio of 2.25  Urine kappa light chains normal 41 with lambda normal 6 and ratio normal at 6  Bone survey essentially negative for myeloma save for a small minor posterior calvarial abnormality most likely venous lake.  Coincidental cardiomegaly with mild pulmonary vascular congestion.      -8/4/2020 data:   Sedimentation rate 37  White count 3100 hemoglobin 11.7 with platelets 115,000  Creatinine 1.27 with GFR greater than 60 and calcium 9.1 normal with normal total protein and albumin    -8/17/2020 hematology follow-up visit: Reviewed above data with patient.  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  Given persistence of monoclonal light chain with mild renal  dysfunction even if the cranial abnormality is just a venous lake, I would still complete work-up with bone marrow biopsy to ensure no plasma cell dyscrasia, myelodysplasia, aplasia.    -8/20/2020 bone marrow biopsy showed mild hypercellularity 33% with out atypia.  No increase in plasma cells.  No T-cell or B-cell clonality on flow.  Normal cytogenetics.  Hemoglobin 11.3 with platelets 123,000.  Hence low likelihood for plasma cell dyscrasia or myelodysplasia.    -9/3/2020 hematology follow-up visit: Reviewed results of bone marrow biopsy.  No hint of myelodysplasia or plasma cell dyscrasia or myelophthisic process.  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    -4/26/2021 [facility] hematology oncology follow-up visit: I reviewed his 3/22/2021 data: White count 2810 with hemoglobin 11 with platelets 134,000 and absolute neutrophil count 1200.  CMP unremarkable with normal liver enzymes and creatinine 1.15 with normal calcium 9.0 and normal total protein and alkaline phosphatase.  No serum M spike and scant elevation of kappa light chain 37.7 with normal lambda all improved over the last year.  Normal quantitative immunoglobulins.  24-hour urine immunoelectrophoresis had no monoclonality.  Sedimentation rate 21 with C-reactive protein 0.91 both minimally elevated.  Ionized calcium just barely above normal 1.37.  Beta-2 microglobulin 3.0 stable since July.  The bulk of his pancytopenia is sequestered if portal hypertension.  No significant kappa light chains in the serum and he has gastric angio ectasias.  We will follow with Dr. Perez and I will repeat his M panel again in 1 year and if that is  stable would probably discontinue routine M panel testing as I suspect this is just an inflammatory marker with rheumatoid arthritis.    -5/2/2022 [facility] Hematology clinic follow-up: Unfortunately Mr. Camejo did not get his labs prior to our appointment today.  We will check his M panel again today (serum free light chains, serum immunoelectrophoresis) along with CBC, CMP, sed rate and CRP.  Assuming he still has no abnormal protein and his CBC is stable then we will discontinue routine hematological follow-up as recommended at previous visit with Dr. Herrera on 4/26/2021.  I will call him later this week with his lab results from today.  Addendum: 5/13/2022 phone call: No m spike.  Labs stable fu prn.    - 2/24/2023 [facility] hematology follow-up: 8/3/2022 sedimentation rate 45 C-reactive protein 4.47.  White count 2400 hemoglobin 11.1 platelets 119,000 unremarkable BMP save for creatinine 1.41.  Normal total protein, albumin globulin and ratios.  Normal liver enzymes.  2/10/2023 White count 3500 hemoglobin 11 platelets 113,000 with unremarkable differential sedimentation rate 45.  Uric acid mildly elevated 7.2.  I have put in a referral back to Dr. 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  His blood counts overall have been stable for the better part of the last 9 months and he will see us on an as-needed basis.    -3/6/2023 [facility] inpatient hematology oncology consult: Since last I saw the patient and before he could get back to Dr. Perez for further management of his pancytopenia and gastric antral vascular ectasia, he was admitted with increasing shortness of breath and cough with increasing abdominal girth and pulmonary edema.  On evaluation of this, 3/5/2023 CT angiogram of chest and CT abdomen pelvis done In the emergency room in addition to moderate CHF and gallbladder distention showed enlarged bilateral inguinal  nodes most likely reactive.  I was asked to consult regarding this.  Ultimately need to know what these nodes are and whether the balanced elevation of his light chains could be related to underlying lymphoma which is a possibility albeit unlikely, ultimately we need tissue.  I have spoken with Dr. Coleman who understandably in the midst of his cardiac issues is hesitant to try to excavate an inguinal node surgically.  We will first try ultrasound-guided lymph node biopsy and see if that gives us any definitive answers.  My nurse practitioner will be covering for the next couple of days and I will check back Thursday.     -3/7/2023 [facility] hematology oncology inpatient follow-up:  Patient had biopsy of left inguinal lymph node this morning and tolerated it well.  We will follow up on results when available.  If results are not definitive then may consider excisional biopsy in the future when respiratory and heart failure symptoms have improved.  Cardiology following.  KUB suggest possible ileus.  Gallbladder US showed gallbladder within normal limits.  Continues to have abdominal distention with fullness.  Plan for HIDA scan today.       -3/8/2023 [facility] hematology oncology inpatient follow-up:   Left inguinal lymph node biopsy from yesterday pending.  We will follow up on results.  If not definite may consider excisional biopsy.  GI symptoms seem to be improved.  GI following.  Addendum: Left inguinal lymph node needle core biopsy showed fragments of benign lymph node with dense fibrovascular tissue negative for malignancy.  KUB showed ileus but HIDA scan showed no cholecystitis but ejection fraction of 19% with hypofunctioning gallbladder.  They discharged him this day on Xifaxan and recommendation to follow-up with Dr. Perez.  Hemoglobin on the day of discharge 3/8/2023 was 9.5 stable with normal white count and platelets minimally decreased 126,000 and stable.    -3/17/2023 [facility] hematology oncology follow-up  outpatient: Inguinal biopsy site healing.  No pathologic abnormalities on core biopsy.  With multiple comorbidities, there is some intrinsic risk to excisional biopsy and for now rather than pushing the issue to get Dr. Coleman to remove this note, I will just repeat his CT chest abdomen pelvis in a couple of months and in the meantime he has a follow-up with Dr. 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  Overall his hemoglobin is stable in the nines-tens with stable platelet count in the 120,000's.    -5/18/2023 [facility] hematology oncology follow-up: I reviewed labs from today.  Hemoglobin is stable at 9.8 with normal white count and platelets.  Creatinine 1.52 and BUN 32 otherwise CMP PE unremarkable.  CT chest abdomen and pelvis was ordered to follow-up on inguinal lymphadenopathy from recent admission but for some reason has not been scheduled.  I will make sure those are scheduled and we will see him back in 1 month to review the results.  He continues to follow with Dr. 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  I reviewed his stress test results from yesterday that appeared normal.  He will continue to follow with Dr. Lyon.    Total time of patient care on day of service including time prior to, face to face with patient, and following visit spent in reviewing records, lab results, imaging studies, discussion with patient, and documentation/charting was > 20 minutes.     Trinh Kinney, APRN  05/18/2023

## 2023-10-10 ENCOUNTER — HOSPITAL ENCOUNTER (OUTPATIENT)
Dept: CT IMAGING | Facility: HOSPITAL | Age: 78
Discharge: HOME | End: 2023-10-10
Admitting: NURSE PRACTITIONER
Payer: MEDICARE

## 2023-10-10 DIAGNOSIS — R59.0 INGUINAL LYMPHADENOPATHY: ICD-10-CM

## 2023-10-10 PROCEDURE — 71250 CT THORAX DX C-: CPT

## 2023-10-10 PROCEDURE — 74176 CT ABD & PELVIS W/O CONTRAST: CPT

## 2023-10-12 ENCOUNTER — LAB (OUTPATIENT)
Dept: LAB | Facility: HOSPITAL | Age: 78
End: 2023-10-12
Payer: MEDICARE

## 2023-10-12 ENCOUNTER — OFFICE VISIT (OUTPATIENT)
Dept: ONCOLOGY | Facility: CLINIC | Age: 78
End: 2023-10-12
Payer: MEDICARE

## 2023-10-12 VITALS
BODY MASS INDEX: 34.4 KG/M2 | TEMPERATURE: 97.4 F | HEART RATE: 69 BPM | WEIGHT: 227 LBS | HEIGHT: 68 IN | SYSTOLIC BLOOD PRESSURE: 150 MMHG | OXYGEN SATURATION: 93 % | RESPIRATION RATE: 18 BRPM | DIASTOLIC BLOOD PRESSURE: 60 MMHG

## 2023-10-12 DIAGNOSIS — D69.6 THROMBOCYTOPENIA: ICD-10-CM

## 2023-10-12 DIAGNOSIS — D64.9 ANEMIA, UNSPECIFIED TYPE: Primary | ICD-10-CM

## 2023-10-12 DIAGNOSIS — D64.9 ANEMIA, UNSPECIFIED TYPE: ICD-10-CM

## 2023-10-12 LAB
ALBUMIN SERPL-MCNC: 3.9 G/DL (ref 3.5–5.2)
ALBUMIN/GLOB SERPL: 1.3 G/DL
ALP SERPL-CCNC: 72 U/L (ref 39–117)
ALT SERPL W P-5'-P-CCNC: 21 U/L (ref 1–41)
ANION GAP SERPL CALCULATED.3IONS-SCNC: 8 MMOL/L (ref 5–15)
AST SERPL-CCNC: 16 U/L (ref 1–40)
BASOPHILS # BLD AUTO: 0.06 10*3/MM3 (ref 0–0.2)
BASOPHILS NFR BLD AUTO: 1.6 % (ref 0–1.5)
BILIRUB SERPL-MCNC: 0.3 MG/DL (ref 0–1.2)
BUN SERPL-MCNC: 34 MG/DL (ref 8–23)
BUN/CREAT SERPL: 23.3 (ref 7–25)
CALCIUM SPEC-SCNC: 9 MG/DL (ref 8.6–10.5)
CHLORIDE SERPL-SCNC: 110 MMOL/L (ref 98–107)
CO2 SERPL-SCNC: 28 MMOL/L (ref 22–29)
CREAT SERPL-MCNC: 1.46 MG/DL (ref 0.76–1.27)
CRP SERPL-MCNC: 0.51 MG/DL (ref 0–0.5)
DEPRECATED RDW RBC AUTO: 43.4 FL (ref 37–54)
EGFRCR SERPLBLD CKD-EPI 2021: 48.9 ML/MIN/1.73
EOSINOPHIL # BLD AUTO: 0.23 10*3/MM3 (ref 0–0.4)
EOSINOPHIL NFR BLD AUTO: 6.2 % (ref 0.3–6.2)
ERYTHROCYTE [DISTWIDTH] IN BLOOD BY AUTOMATED COUNT: 12.7 % (ref 12.3–15.4)
ERYTHROCYTE [SEDIMENTATION RATE] IN BLOOD: 46 MM/HR (ref 0–20)
GLOBULIN UR ELPH-MCNC: 3.1 GM/DL
GLUCOSE SERPL-MCNC: 102 MG/DL (ref 65–99)
HCT VFR BLD AUTO: 32 % (ref 37.5–51)
HGB BLD-MCNC: 10.4 G/DL (ref 13–17.7)
IMM GRANULOCYTES # BLD AUTO: 0 10*3/MM3 (ref 0–0.05)
IMM GRANULOCYTES NFR BLD AUTO: 0 % (ref 0–0.5)
LYMPHOCYTES # BLD AUTO: 1.05 10*3/MM3 (ref 0.7–3.1)
LYMPHOCYTES NFR BLD AUTO: 28.2 % (ref 19.6–45.3)
MCH RBC QN AUTO: 29.8 PG (ref 26.6–33)
MCHC RBC AUTO-ENTMCNC: 32.5 G/DL (ref 31.5–35.7)
MCV RBC AUTO: 91.7 FL (ref 79–97)
MONOCYTES # BLD AUTO: 0.5 10*3/MM3 (ref 0.1–0.9)
MONOCYTES NFR BLD AUTO: 13.4 % (ref 5–12)
NEUTROPHILS NFR BLD AUTO: 1.88 10*3/MM3 (ref 1.7–7)
NEUTROPHILS NFR BLD AUTO: 50.6 % (ref 42.7–76)
PLATELET # BLD AUTO: 110 10*3/MM3 (ref 140–450)
PMV BLD AUTO: 10.3 FL (ref 6–12)
POTASSIUM SERPL-SCNC: 4.8 MMOL/L (ref 3.5–5.2)
PROT SERPL-MCNC: 7 G/DL (ref 6–8.5)
RBC # BLD AUTO: 3.49 10*6/MM3 (ref 4.14–5.8)
SODIUM SERPL-SCNC: 146 MMOL/L (ref 136–145)
WBC NRBC COR # BLD: 3.72 10*3/MM3 (ref 3.4–10.8)

## 2023-10-12 PROCEDURE — 85025 COMPLETE CBC W/AUTO DIFF WBC: CPT

## 2023-10-12 PROCEDURE — 1126F AMNT PAIN NOTED NONE PRSNT: CPT | Performed by: INTERNAL MEDICINE

## 2023-10-12 PROCEDURE — 82784 ASSAY IGA/IGD/IGG/IGM EACH: CPT

## 2023-10-12 PROCEDURE — 84165 PROTEIN E-PHORESIS SERUM: CPT

## 2023-10-12 PROCEDURE — 1159F MED LIST DOCD IN RCRD: CPT | Performed by: INTERNAL MEDICINE

## 2023-10-12 PROCEDURE — 3077F SYST BP >= 140 MM HG: CPT | Performed by: INTERNAL MEDICINE

## 2023-10-12 PROCEDURE — 86140 C-REACTIVE PROTEIN: CPT

## 2023-10-12 PROCEDURE — 1160F RVW MEDS BY RX/DR IN RCRD: CPT | Performed by: INTERNAL MEDICINE

## 2023-10-12 PROCEDURE — 3078F DIAST BP <80 MM HG: CPT | Performed by: INTERNAL MEDICINE

## 2023-10-12 PROCEDURE — 86334 IMMUNOFIX E-PHORESIS SERUM: CPT

## 2023-10-12 PROCEDURE — 83521 IG LIGHT CHAINS FREE EACH: CPT

## 2023-10-12 PROCEDURE — 36415 COLL VENOUS BLD VENIPUNCTURE: CPT

## 2023-10-12 PROCEDURE — 80053 COMPREHEN METABOLIC PANEL: CPT

## 2023-10-12 PROCEDURE — 99214 OFFICE O/P EST MOD 30 MIN: CPT | Performed by: INTERNAL MEDICINE

## 2023-10-12 PROCEDURE — 85652 RBC SED RATE AUTOMATED: CPT

## 2023-10-12 PROCEDURE — 82785 ASSAY OF IGE: CPT

## 2023-10-12 RX ORDER — AMLODIPINE BESYLATE 10 MG/1
10 TABLET ORAL DAILY
COMMUNITY
Start: 2023-09-15

## 2023-10-12 NOTE — PROGRESS NOTES
CHIEF COMPLAINT: No new somatic complaints    Problem List:  Oncology/Hematology History Overview Note   1.  Pancytopenia with combination of anemia renal disease, possible sequestration with the hepatomegaly/portal vein dilation/SMV dilation on February 2020 liver spleen ultrasound suggesting portal hypertension and possible contribution from the gastric vascular ectasias on EGD  elevation of serum kappa and lambda light chains and gastric angioectasias on 7/17/2020 EGD.  Benign needle core node biopsy March 2023  2.  Chronic low back pain   3.  Chronic kidney disease  4.  Type 2 diabetes  5.  Hyperlipidemia  6.  Hypertension  7.  Hypogonadism  8.  Depression  9. COPD  10.  Obstructive sleep apnea  11.  Reflux   12.  Gout  13 osteoarthritis  14.  BPH with ED  15.  BMI 36  16.  Putative history of rheumatoid arthritis  17.  Chronic granulomatous lung nodules on CT chest screening with routine follow-up recommended  18.  Scant elevation of serum light chains without intact monoclonal gammopathy and no plasma cell dyscrasia on bone marrow biopsy August 2020  19.  Inguinal adenopathy    Hematology history:  -Longstanding history of heme positive stools dating back to the late 80s with at least 5 colonoscopies by Dr. Perez including the last 1/2018 as well as EGDs and a capsule endoscopy that apparently showed scattered blood that they could not do much about.In the [facility] system we have hemoglobins of 9.8 and platelet of 121,000 with white count 4880 as of June 2016 and December 2015 white count 3020 with platelets 126,000 hemoglobin 11.4.  Does have a history of heavy alcohol use but none in the past several years.  -8/7/2019 Hemoccult negative  -1/30/2020 reticulocyte count 1.4%.  B12 618.  Iron slightly low 48.  White count 2700 with hemoglobin 11.6 MCV 88 and normal red cell distribution width with platelets 124,000.  Normal thyroid functions.  -2/4/2020 white count 3100 hemoglobin 11.7 with normal MCV and red  cell distribution with platelets 126,000 normal liver enzymes and bilirubin including fractionation.  Creatinine 1.36 GFR greater than 60 with normal calcium 9.1.  .  C-reactive protein 9.67 with sedimentation rate elevated at 40 as well.  Was started on iron with vitamin C.    -2/27/2020 initial [facility] hematology consultation: He had dark tarry stools predating the institution of his current iron and extensive prior endoscopic work-up as outlined above.  I will check his methylmalonic acid and homocystine along with repeat B12 and folate and with his elevated C-reactive protein and sedimentation rate will check a serum immunoelectrophoresis and light chains.  Given his prior drinking history despite the fact that his liver enzymes have been normal I strongly suspect portal hypertension and I suspect the GI bleeding may be related to this but we will get records from .  If we do not get answers from this then we will proceed with bone marrow biopsy.  If there is a monoclonal gammopathy we will also proceed with bone marrow biopsy.  With his putative history of rheumatoid arthritis he could have splenomegaly with Felty syndrome as well and I will check his ROSARIO and rheumatoid factor.    -2/27/20 data:  Hgb 11.4 o/w normal CBC  Periph. Smear mild hypochromic anemia with mature WBC's    ZACKARY neg  Haptogb 213 high  Retic 2 %    Nl bili direct and indirect  Urine hemosiderin negative  Plasma free hemoglobin normal 7  G6PD 279 normal    ROSARIO neg  RF <10    Epo 22.4    Cr 1.6     nl  B12>2k  Homocysteine 10.5  Folate >20    Liver Spleen US:  Portal vein 1.7cm dilated  Smv Dilated 1.3 cm  But nl spleen size  Hepatomegaly with abnormal liver echogenicity.    Serum K 57.9, lambda 30.7, ratio 1.89.  Serum M spike zero    -5/26/2020 CT chest low-dose without contrast shows chronic granulomatous nodules lung RADS 2 with recommended annual follow-up.  Increased prominence of mucosal thickening distal esophagus  compared to 2017 suggestive of esophagitis.    -7/17/2020 gastric angioma ectasias on EGD with Dr. Perez.  No esophageal abnormalities to corroborate with the above CT findings.    -7/27/2020 data:  White count 3600 with hemoglobin 11.6 platelets 137,000.  Creatinine 1.3 with GFR 65  Beta-2 microglobulin 3.2, upper limit 2.2  C-reactive protein 0.49/sedimentation rate 24  Serum immunoelectrophoresis showed no monoclonal spike  Serum free kappa light chains 52.8 with ratio of 2.25  Urine kappa light chains normal 41 with lambda normal 6 and ratio normal at 6  Bone survey essentially negative for myeloma save for a small minor posterior calvarial abnormality most likely venous lake.  Coincidental cardiomegaly with mild pulmonary vascular congestion.      -8/4/2020 data:   Sedimentation rate 37  White count 3100 hemoglobin 11.7 with platelets 115,000  Creatinine 1.27 with GFR greater than 60 and calcium 9.1 normal with normal total protein and albumin    -8/17/2020 hematology follow-up visit: Reviewed above data with patient.  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  Given persistence of monoclonal light chain with mild renal dysfunction even if the cranial abnormality is just a venous lake, I would still complete work-up with bone marrow biopsy to ensure no plasma cell dyscrasia, myelodysplasia, aplasia.    -8/20/2020 bone marrow biopsy showed mild hypercellularity 33% with out atypia.  No increase in plasma cells.  No T-cell or B-cell clonality on flow.  Normal cytogenetics.  Hemoglobin 11.3 with platelets 123,000.  Hence low likelihood for plasma cell dyscrasia or myelodysplasia.    -9/3/2020 hematology follow-up visit: Reviewed results of bone marrow biopsy.  No hint of myelodysplasia or plasma  cell dyscrasia or myelophthisic process.  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    -4/26/2021 [facility] hematology oncology follow-up visit: I reviewed his 3/22/2021 data: White count 2810 with hemoglobin 11 with platelets 134,000 and absolute neutrophil count 1200.  CMP unremarkable with normal liver enzymes and creatinine 1.15 with normal calcium 9.0 and normal total protein and alkaline phosphatase.  No serum M spike and scant elevation of kappa light chain 37.7 with normal lambda all improved over the last year.  Normal quantitative immunoglobulins.  24-hour urine immunoelectrophoresis had no monoclonality.  Sedimentation rate 21 with C-reactive protein 0.91 both minimally elevated.  Ionized calcium just barely above normal 1.37.  Beta-2 microglobulin 3.0 stable since July.  The bulk of his pancytopenia is sequestered if portal hypertension.  No significant kappa light chains in the serum and he has gastric angio ectasias.  We will follow with Dr. Perez and I will repeat his M panel again in 1 year and if that is stable would probably discontinue routine M panel testing as I suspect this is just an inflammatory marker with rheumatoid arthritis.    -5/2/2022 [facility] Hematology clinic follow-up: Unfortunately Mr. Camejo did not get his labs prior to our appointment today.  We will check his M panel again today (serum free light chains, serum immunoelectrophoresis) along with CBC, CMP, sed rate and CRP.  Assuming he still has no abnormal protein and his CBC is stable then we will discontinue routine hematological follow-up as recommended at previous visit with Dr. Herrera on 4/26/2021.  I  will call him later this week with his lab results from today.  Addendum: 5/13/2022 phone call: No m spike.  Labs stable fu prn.    - 2/24/2023 [facility] hematology follow-up: 8/3/2022 sedimentation rate 45 C-reactive protein 4.47.  White count 2400 hemoglobin 11.1 platelets 119,000 unremarkable BMP save for creatinine 1.41.  Normal total protein, albumin globulin and ratios.  Normal liver enzymes.  2/10/2023 White count 3500 hemoglobin 11 platelets 113,000 with unremarkable differential sedimentation rate 45.  Uric acid mildly elevated 7.2.  I have put in a referral back to Dr. 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  His blood counts overall have been stable for the better part of the last 9 months and he will see us on an as-needed basis.    -3/6/2023 [facility] inpatient hematology oncology consult: Since last I saw the patient and before he could get back to Dr. Perez for further management of his pancytopenia and gastric antral vascular ectasia, he was admitted with increasing shortness of breath and cough with increasing abdominal girth and pulmonary edema.  On evaluation of this, 3/5/2023 CT angiogram of chest and CT abdomen pelvis done In the emergency room in addition to moderate CHF and gallbladder distention showed enlarged bilateral inguinal nodes most likely reactive.  I was asked to consult regarding this.  Ultimately need to know what these nodes are and whether the balanced elevation of his light chains could be related to underlying lymphoma which is a possibility albeit unlikely, ultimately we need tissue.  I have spoken with Dr. Coleman who understandably in the midst of his cardiac issues is hesitant to try to excavate an inguinal node surgically.  We will first try ultrasound-guided lymph node biopsy and see if that gives us any definitive answers.  My nurse practitioner will be covering for the next couple  of days and I will check back Thursday.     -3/7/2023 [facility] hematology oncology inpatient follow-up:  Patient had biopsy of left inguinal lymph node this morning and tolerated it well.  We will follow up on results when available.  If results are not definitive then may consider excisional biopsy in the future when respiratory and heart failure symptoms have improved.  Cardiology following.  KUB suggest possible ileus.  Gallbladder US showed gallbladder within normal limits.  Continues to have abdominal distention with fullness.  Plan for HIDA scan today.       -3/8/2023 [facility] hematology oncology inpatient follow-up:   Left inguinal lymph node biopsy from yesterday pending.  We will follow up on results.  If not definite may consider excisional biopsy.  GI symptoms seem to be improved.  GI following.  Addendum: Left inguinal lymph node needle core biopsy showed fragments of benign lymph node with dense fibrovascular tissue negative for malignancy.  KUB showed ileus but HIDA scan showed no cholecystitis but ejection fraction of 19% with hypofunctioning gallbladder.  They discharged him this day on Xifaxan and recommendation to follow-up with Dr. Perez.  Hemoglobin on the day of discharge 3/8/2023 was 9.5 stable with normal white count and platelets minimally decreased 126,000 and stable.    -3/17/2023 [facility] hematology oncology follow-up outpatient: Inguinal biopsy site healing.  No pathologic abnormalities on core biopsy.  With multiple comorbidities, there is some intrinsic risk to excisional biopsy and for now rather than pushing the issue to get Dr. Coleman to remove this note, I will just repeat his CT chest abdomen pelvis in a couple of months and in the meantime he has a follow-up with Dr. 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  Overall his hemoglobin is stable in the nines-tens with stable platelet count in the 120,000's.    -5/18/2023 [facility] hematology  oncology follow-up: I reviewed labs from today.  Hemoglobin is stable at 9.8 with normal white count and platelets.  Creatinine 1.52 and BUN 32 otherwise CMP PE unremarkable.  CT chest abdomen and pelvis was ordered to follow-up on inguinal lymphadenopathy from recent admission but for some reason has not been scheduled.  I will make sure those are scheduled and we will see him back in 1 month to review the results.  He continues to follow with Dr. 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  I reviewed his stress test results from yesterday that appeared normal.  He will continue to follow with Dr. Lyon.    .-6/20/2023 follow-up with Dr. Lyon cardiologist at [facility].  Mentions 5/22/2023 hemoglobin 9.9 with normal white count and platelet count 143,000 with creatinine 1.64, proBNP 2813, normal B12 and folate, ferritin normal 63.9 with iron 65.  Ejection fraction 60% with moderate pericardial effusion with recurrent admissions for near euvolemic CHF and renal artery stenosis less than 60% on the right.  Based on fluid restriction, blood pressure log, 3 g sodium, 1.5 L fluid restriction and compression stockings with follow-up in 3 months.    -6/26/2023 [facility] hematology oncology follow-up: With reference to his chronic pancytopenia likely related to GAVE/portal hypertension, he continues to follow-up with Dr. Perez later this month.  With reference to his heart failure he continues to follow with Dr. Lyon who adjusted labs as outlined above and overall labs are doing fairly well.  With reference to his inguinal adenopathy seen on admission 3 months ago, he did not get his CAT scans as ordered 3 days ago.  We will order those without contrast now and have him follow-up with my nurse practitioner to make sure the nodes are not enlarging.  If so, we will get him to Dr. Coleman for excisional node biopsy.  His main complaint is of constipation for which he takes Linzess with Dr. Perez.   Counts overall doing reasonably well all things considered.    -7/1/2023 CT chest abdomen pelvis showed redemonstration of enlarged left inguinal lymph nodes which appear similar to minimally increased in size when compared to prior CT.  No suspicious or bulky adenopathy within chest abdomen or pelvis.  Showed circumferential wall thickening of the distal esophagus which is nonspecific and could be related to sequela of GERD.      -7/10/2023 [facility] hematology oncology follow-up: He continues to follow with Dr. Perez for his GAVE/portal hypertension.  He also is prescribed Linzess for chronic constipation.  He saw Dr. Perez last week.  He continues to follow with Dr. Lyon for his heart failure.  I reviewed CT results with patient that showed enlarged left inguinal lymph nodes that are similar to minimally increased in size when compared to prior CT.  I discussed with Dr. Herrera and we will repeat scans in 3 months.  If lymph nodes enlarged, we will get him to Dr. Coleman for excisional node biopsy.  I discussed wall thickening of the distal esophagus with patient and he reports having history of GERD.  He will notify Dr. Perez of results as well.  We will see him back in 3 months for follow-up with scans.  Instructed him to notify us if he feels lymph nodes are enlarging or become symptomatic and we will repeat scans sooner.    -8/22/2023 hemoglobin 11.1 white count 2800 platelets 131,000    -10/10/2023 CT chest abdomen pelvis without contrast compared to 7/1/2023 6 showed slightly larger small pericardial effusion with small pleural effusions and other stable changes.  No pathologic adenopathy in the chest.  Persistent relatively stable enlarged inguinal nodes (left 3.3 cm, right 3 cm).  Hepatic hypodensities similar to prior which are likely cysts.  Spleen unremarkable.    -10/12/2023 [facility] hematology follow-up: Counts in August stable save for decrease in white count.  Inguinal nodes stable 3 cm.  We will check  "CBC along with his impanel today and have him see my nurse practitioner back in a few weeks to go over results.  If all of these are stable then we will repeat his counts and CTs in 6 months and if the nodes grow have Dr. Coleman perform excisional biopsy but thus far the nodes have stayed stable and are unlikely to be malignant or certainly high-grade at any rate given stability over time.     Anemia   2/26/2020 Initial Diagnosis    Pancytopenia (CMS/HCC)         HISTORY OF PRESENT ILLNESS:  The patient is a 78 y.o. male, here for follow up on management of pancytopenia with GAVE/portal hypertension    Past Medical History:   Diagnosis Date    Arthritis     Asthma     Bowel trouble     CHF (congestive heart failure)     Chondrocalcinosis of right knee     Colitis     COPD (chronic obstructive pulmonary disease)     Diabetes mellitus     Esophagitis     Gout     H/O bladder problems     Heart murmur     Hypertension     IBS (irritable bowel syndrome)     JONAH on CPAP     Primary osteoarthritis of both knees     Rheumatoid arthritis     Skin problem     SOB (shortness of breath)      Past Surgical History:   Procedure Laterality Date    COLONOSCOPY      KNEE ARTHROSCOPY Left     PARTIAL KNEE ARTHROPLASTY Left     SKIN BIOPSY      STOMACH SURGERY      UPPER GASTROINTESTINAL ENDOSCOPY      US GUIDED LYMPH NODE BIOPSY  3/7/2023    VASECTOMY         Allergies   Allergen Reactions    Lisinopril Swelling    Benazepril Swelling       Family History and Social History reviewed and changed as necessary    REVIEW OF SYSTEM:   No new somatic complaints    PHYSICAL EXAM:  No jaundice icterus or pallor.  No abdominal tenderness.  Inguinal nodes not appreciable to my hand.    Vitals:    10/12/23 0952   BP: 150/60   Pulse: 69   Resp: 18   Temp: 97.4 øF (36.3 øC)   SpO2: 93%   Weight: 103 kg (227 lb)   Height: 172.7 cm (68\")     Vitals:    10/12/23 0952   PainSc: 0-No pain          ECOG score: 1           Vitals reviewed.        Lab " Results   Component Value Date    HGB 9.8 (L) 05/18/2023    HCT 30.5 (L) 05/18/2023    MCV 95.0 05/18/2023     05/18/2023    WBC 3.93 05/18/2023    NEUTROABS 2.11 05/18/2023    LYMPHSABS 0.71 05/18/2023    MONOSABS 0.54 05/18/2023    EOSABS 0.49 (H) 05/18/2023    BASOSABS 0.06 05/18/2023       Lab Results   Component Value Date    GLUCOSE 99 05/18/2023    BUN 32 (H) 05/18/2023    CREATININE 1.52 (H) 05/18/2023     05/18/2023    K 5.1 05/18/2023     (H) 05/18/2023    CO2 25.0 05/18/2023    CALCIUM 9.0 05/18/2023    PROTEINTOT 6.8 05/18/2023    ALBUMIN 3.9 05/18/2023    BILITOT 0.4 05/18/2023    ALKPHOS 75 05/18/2023    AST 16 05/18/2023    ALT 21 05/18/2023             ASSESSMENT & PLAN:  1.  Pancytopenia with combination of anemia renal disease, possible sequestration with the hepatomegaly/portal vein dilation/SMV dilation on February 2020 liver spleen ultrasound suggesting portal hypertension and possible contribution from the gastric vascular ectasias on EGD  elevation of serum kappa and lambda light chains and gastric angioectasias on 7/17/2020 EGD.  Benign excisional node biopsy March 2023  2.  Chronic low back pain   3.  Chronic kidney disease  4.  Type 2 diabetes  5.  Hyperlipidemia  6.  Hypertension  7.  Hypogonadism  8.  Depression  9. COPD  10.  Obstructive sleep apnea  11.  Reflux   12.  Gout  13 osteoarthritis  14.  BPH with ED  15.  BMI 36  16.  Putative history of rheumatoid arthritis  17.  Chronic granulomatous lung nodules on CT chest screening with routine follow-up recommended  18.  Scant elevation of serum light chains without intact monoclonal gammopathy and no plasma cell dyscrasia on bone marrow biopsy August 2020  19.  Inguinal adenopathy    Hematology history:  -Longstanding history of heme positive stools dating back to the late 80s with at least 5 colonoscopies by Dr. Perez including the last 1/2018 as well as EGDs and a capsule endoscopy that apparently showed scattered  blood that they could not do much about.In the [facility] system we have hemoglobins of 9.8 and platelet of 121,000 with white count 4880 as of June 2016 and December 2015 white count 3020 with platelets 126,000 hemoglobin 11.4.  Does have a history of heavy alcohol use but none in the past several years.  -8/7/2019 Hemoccult negative  -1/30/2020 reticulocyte count 1.4%.  B12 618.  Iron slightly low 48.  White count 2700 with hemoglobin 11.6 MCV 88 and normal red cell distribution width with platelets 124,000.  Normal thyroid functions.  -2/4/2020 white count 3100 hemoglobin 11.7 with normal MCV and red cell distribution with platelets 126,000 normal liver enzymes and bilirubin including fractionation.  Creatinine 1.36 GFR greater than 60 with normal calcium 9.1.  .  C-reactive protein 9.67 with sedimentation rate elevated at 40 as well.  Was started on iron with vitamin C.    -2/27/2020 initial [facility] hematology consultation: He had dark tarry stools predating the institution of his current iron and extensive prior endoscopic work-up as outlined above.  I will check his methylmalonic acid and homocystine along with repeat B12 and folate and with his elevated C-reactive protein and sedimentation rate will check a serum immunoelectrophoresis and light chains.  Given his prior drinking history despite the fact that his liver enzymes have been normal I strongly suspect portal hypertension and I suspect the GI bleeding may be related to this but we will get records from .  If we do not get answers from this then we will proceed with bone marrow biopsy.  If there is a monoclonal gammopathy we will also proceed with bone marrow biopsy.  With his putative history of rheumatoid arthritis he could have splenomegaly with Felty syndrome as well and I will check his ROSARIO and rheumatoid factor.    -2/27/20 data:  Hgb 11.4 o/w normal CBC  Periph. Smear mild hypochromic anemia with mature WBC's    ZACKARY neg  Haptogb 213  high  Retic 2 %    Nl bili direct and indirect  Urine hemosiderin negative  Plasma free hemoglobin normal 7  G6PD 279 normal    ROSARIO neg  RF <10    Epo 22.4    Cr 1.6     nl  B12>2k  Homocysteine 10.5  Folate >20    Liver Spleen US:  Portal vein 1.7cm dilated  Smv Dilated 1.3 cm  But nl spleen size  Hepatomegaly with abnormal liver echogenicity.    Serum K 57.9, lambda 30.7, ratio 1.89.  Serum M spike zero    -5/26/2020 CT chest low-dose without contrast shows chronic granulomatous nodules lung RADS 2 with recommended annual follow-up.  Increased prominence of mucosal thickening distal esophagus compared to 2017 suggestive of esophagitis.    -7/17/2020 gastric angioma ectasias on EGD with Dr. Perez.  No esophageal abnormalities to corroborate with the above CT findings.    -7/27/2020 data:  White count 3600 with hemoglobin 11.6 platelets 137,000.  Creatinine 1.3 with GFR 65  Beta-2 microglobulin 3.2, upper limit 2.2  C-reactive protein 0.49/sedimentation rate 24  Serum immunoelectrophoresis showed no monoclonal spike  Serum free kappa light chains 52.8 with ratio of 2.25  Urine kappa light chains normal 41 with lambda normal 6 and ratio normal at 6  Bone survey essentially negative for myeloma save for a small minor posterior calvarial abnormality most likely venous lake.  Coincidental cardiomegaly with mild pulmonary vascular congestion.      -8/4/2020 data:   Sedimentation rate 37  White count 3100 hemoglobin 11.7 with platelets 115,000  Creatinine 1.27 with GFR greater than 60 and calcium 9.1 normal with normal total protein and albumin    -8/17/2020 hematology follow-up visit: Reviewed above data with patient.  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  Given persistence of  monoclonal light chain with mild renal dysfunction even if the cranial abnormality is just a venous lake, I would still complete work-up with bone marrow biopsy to ensure no plasma cell dyscrasia, myelodysplasia, aplasia.    -8/20/2020 bone marrow biopsy showed mild hypercellularity 33% with out atypia.  No increase in plasma cells.  No T-cell or B-cell clonality on flow.  Normal cytogenetics.  Hemoglobin 11.3 with platelets 123,000.  Hence low likelihood for plasma cell dyscrasia or myelodysplasia.    -9/3/2020 hematology follow-up visit: Reviewed results of bone marrow biopsy.  No hint of myelodysplasia or plasma cell dyscrasia or myelophthisic process.  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    -4/26/2021 [facility] hematology oncology follow-up visit: I reviewed his 3/22/2021 data: White count 2810 with hemoglobin 11 with platelets 134,000 and absolute neutrophil count 1200.  CMP unremarkable with normal liver enzymes and creatinine 1.15 with normal calcium 9.0 and normal total protein and alkaline phosphatase.  No serum M spike and scant elevation of kappa light chain 37.7 with normal lambda all improved over the last year.  Normal quantitative immunoglobulins.  24-hour urine immunoelectrophoresis had no monoclonality.  Sedimentation rate 21 with C-reactive protein 0.91 both minimally elevated.  Ionized calcium just barely above normal 1.37.  Beta-2 microglobulin 3.0 stable since July.  The bulk of his pancytopenia is sequestered if portal hypertension.  No significant kappa light chains in the serum and he has gastric angio ectasias.  We will follow with Dr. Perez and I will repeat his  M panel again in 1 year and if that is stable would probably discontinue routine M panel testing as I suspect this is just an inflammatory marker with rheumatoid arthritis.    -5/2/2022 [facility] Hematology clinic follow-up: Unfortunately Mr. Camejo did not get his labs prior to our appointment today.  We will check his M panel again today (serum free light chains, serum immunoelectrophoresis) along with CBC, CMP, sed rate and CRP.  Assuming he still has no abnormal protein and his CBC is stable then we will discontinue routine hematological follow-up as recommended at previous visit with Dr. Herrera on 4/26/2021.  I will call him later this week with his lab results from today.  Addendum: 5/13/2022 phone call: No m spike.  Labs stable fu prn.    - 2/24/2023 [facility] hematology follow-up: 8/3/2022 sedimentation rate 45 C-reactive protein 4.47.  White count 2400 hemoglobin 11.1 platelets 119,000 unremarkable BMP save for creatinine 1.41.  Normal total protein, albumin globulin and ratios.  Normal liver enzymes.  2/10/2023 White count 3500 hemoglobin 11 platelets 113,000 with unremarkable differential sedimentation rate 45.  Uric acid mildly elevated 7.2.  I have put in a referral back to Dr. 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  His blood counts overall have been stable for the better part of the last 9 months and he will see us on an as-needed basis.    -3/6/2023 [facility] inpatient hematology oncology consult: Since last I saw the patient and before he could get back to Dr. Perez for further management of his pancytopenia and gastric antral vascular ectasia, he was admitted with increasing shortness of breath and cough with increasing abdominal girth and pulmonary edema.  On evaluation of this, 3/5/2023 CT angiogram of chest and CT abdomen pelvis done In the emergency room in addition to moderate CHF and gallbladder  distention showed enlarged bilateral inguinal nodes most likely reactive.  I was asked to consult regarding this.  Ultimately need to know what these nodes are and whether the balanced elevation of his light chains could be related to underlying lymphoma which is a possibility albeit unlikely, ultimately we need tissue.  I have spoken with Dr. Coleman who understandably in the midst of his cardiac issues is hesitant to try to excavate an inguinal node surgically.  We will first try ultrasound-guided lymph node biopsy and see if that gives us any definitive answers.  My nurse practitioner will be covering for the next couple of days and I will check back Thursday.     -3/7/2023 [facility] hematology oncology inpatient follow-up:  Patient had biopsy of left inguinal lymph node this morning and tolerated it well.  We will follow up on results when available.  If results are not definitive then may consider excisional biopsy in the future when respiratory and heart failure symptoms have improved.  Cardiology following.  KUB suggest possible ileus.  Gallbladder US showed gallbladder within normal limits.  Continues to have abdominal distention with fullness.  Plan for HIDA scan today.       -3/8/2023 [facility] hematology oncology inpatient follow-up:   Left inguinal lymph node biopsy from yesterday pending.  We will follow up on results.  If not definite may consider excisional biopsy.  GI symptoms seem to be improved.  GI following.  Addendum: Left inguinal lymph node needle core biopsy showed fragments of benign lymph node with dense fibrovascular tissue negative for malignancy.  KUB showed ileus but HIDA scan showed no cholecystitis but ejection fraction of 19% with hypofunctioning gallbladder.  They discharged him this day on Xifaxan and recommendation to follow-up with Dr. Perez.  Hemoglobin on the day of discharge 3/8/2023 was 9.5 stable with normal white count and platelets minimally decreased 126,000 and  bertin.    -3/17/2023 [facility] hematology oncology follow-up outpatient: Inguinal biopsy site healing.  No pathologic abnormalities on core biopsy.  With multiple comorbidities, there is some intrinsic risk to excisional biopsy and for now rather than pushing the issue to get Dr. Coleman to remove this note, I will just repeat his CT chest abdomen pelvis in a couple of months and in the meantime he has a follow-up with Dr. 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  Overall his hemoglobin is stable in the nines-tens with stable platelet count in the 120,000's.    -5/18/2023 [facility] hematology oncology follow-up: I reviewed labs from today.  Hemoglobin is stable at 9.8 with normal white count and platelets.  Creatinine 1.52 and BUN 32 otherwise CMP PE unremarkable.  CT chest abdomen and pelvis was ordered to follow-up on inguinal lymphadenopathy from recent admission but for some reason has not been scheduled.  I will make sure those are scheduled and we will see him back in 1 month to review the results.  He continues to follow with Dr. 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  I reviewed his stress test results from yesterday that appeared normal.  He will continue to follow with Dr. Lyon.    .-6/20/2023 follow-up with Dr. Lyon cardiologist at [facility].  Mentions 5/22/2023 hemoglobin 9.9 with normal white count and platelet count 143,000 with creatinine 1.64, proBNP 2813, normal B12 and folate, ferritin normal 63.9 with iron 65.  Ejection fraction 60% with moderate pericardial effusion with recurrent admissions for near euvolemic CHF and renal artery stenosis less than 60% on the right.  Based on fluid restriction, blood pressure log, 3 g sodium, 1.5 L fluid restriction and compression stockings with follow-up in 3 months.    -6/26/2023 [facility] hematology oncology follow-up: With reference to his chronic pancytopenia likely related  to GAVE/portal hypertension, he continues to follow-up with Dr. Perez later this month.  With reference to his heart failure he continues to follow with Dr. Lyon who adjusted labs as outlined above and overall labs are doing fairly well.  With reference to his inguinal adenopathy seen on admission 3 months ago, he did not get his CAT scans as ordered 3 days ago.  We will order those without contrast now and have him follow-up with my nurse practitioner to make sure the nodes are not enlarging.  If so, we will get him to Dr. Coleman for excisional node biopsy.  His main complaint is of constipation for which he takes Linzess with Dr. Perez.  Counts overall doing reasonably well all things considered.    -7/1/2023 CT chest abdomen pelvis showed redemonstration of enlarged left inguinal lymph nodes which appear similar to minimally increased in size when compared to prior CT.  No suspicious or bulky adenopathy within chest abdomen or pelvis.  Showed circumferential wall thickening of the distal esophagus which is nonspecific and could be related to sequela of GERD.      -7/10/2023 [facility] hematology oncology follow-up: He continues to follow with Dr. Perez for his GAVE/portal hypertension.  He also is prescribed Linzess for chronic constipation.  He saw Dr. Perez last week.  He continues to follow with Dr. Lyon for his heart failure.  I reviewed CT results with patient that showed enlarged left inguinal lymph nodes that are similar to minimally increased in size when compared to prior CT.  I discussed with Dr. Herrera and we will repeat scans in 3 months.  If lymph nodes enlarged, we will get him to Dr. Coleman for excisional node biopsy.  I discussed wall thickening of the distal esophagus with patient and he reports having history of GERD.  He will notify Dr. Perez of results as well.  We will see him back in 3 months for follow-up with scans.  Instructed him to notify us if he feels lymph nodes are enlarging or become  symptomatic and we will repeat scans sooner.    -8/22/2023 hemoglobin 11.1 white count 2800 platelets 131,000    -10/10/2023 CT chest abdomen pelvis without contrast compared to 7/1/2023 6 showed slightly larger small pericardial effusion with small pleural effusions and other stable changes.  No pathologic adenopathy in the chest.  Persistent relatively stable enlarged inguinal nodes (left 3.3 cm, right 3 cm).  Hepatic hypodensities similar to prior which are likely cysts.  Spleen unremarkable.    -10/12/2023 [facility] [place] hematology follow-up: Counts in August stable save for decrease in white count.  Inguinal nodes stable 3 cm.  We will check CBC along with his impanel today and have him see my nurse practitioner back in a few weeks to go over results.  If all of these are stable then we will repeat his counts and CTs in 6 months and if the nodes grow have Dr. Coleman perform excisional biopsy but thus far the nodes have stayed stable and are unlikely to be malignant or certainly high-grade at any rate given stability over time.    Total time of care today inclusive of time spent today prior to patient's arrival reviewing images and reports of images from a couple of days ago and interval data and during visit putting forth a plan as outlined above and interviewing him as to signs or symptoms relative to the causes and consequences of his pancytopenia and after visit instituting this plan took 30 minutes of patient care time throughout the day today.  Yuval Herrera MD    10/12/2023

## 2023-10-12 NOTE — LETTER
October 12, 2023       No Recipients    Patient: Miguel Camejo   YOB: 1945   Date of Visit: 10/12/2023     Dear Cuong Hairston MD:       Thank you for referring Miguel Camejo to me for evaluation. Below are the relevant portions of my assessment and plan of care.    If you have questions, please do not hesitate to call me. I look forward to following Miguel along with you.         Sincerely,        Yuval Herrera MD        CC:   No Recipients    Yuval Herrera MD  10/12/23 1020  Sign when Signing Visit  CHIEF COMPLAINT: No new somatic complaints    Problem List:  Oncology/Hematology History Overview Note   1.  Pancytopenia with combination of anemia renal disease, possible sequestration with the hepatomegaly/portal vein dilation/SMV dilation on February 2020 liver spleen ultrasound suggesting portal hypertension and possible contribution from the gastric vascular ectasias on EGD  elevation of serum kappa and lambda light chains and gastric angioectasias on 7/17/2020 EGD.  Benign needle core node biopsy March 2023  2.  Chronic low back pain   3.  Chronic kidney disease  4.  Type 2 diabetes  5.  Hyperlipidemia  6.  Hypertension  7.  Hypogonadism  8.  Depression  9. COPD  10.  Obstructive sleep apnea  11.  Reflux   12.  Gout  13 osteoarthritis  14.  BPH with ED  15.  BMI 36  16.  Putative history of rheumatoid arthritis  17.  Chronic granulomatous lung nodules on CT chest screening with routine follow-up recommended  18.  Scant elevation of serum light chains without intact monoclonal gammopathy and no plasma cell dyscrasia on bone marrow biopsy August 2020  19.  Inguinal adenopathy    Hematology history:  -Longstanding history of heme positive stools dating back to the late 80s with at least 5 colonoscopies by Dr. Perez including the last 1/2018 as well as EGDs and a capsule endoscopy that apparently showed scattered blood that they could not do much about.In the [facility] system we have  hemoglobins of 9.8 and platelet of 121,000 with white count 4880 as of June 2016 and December 2015 white count 3020 with platelets 126,000 hemoglobin 11.4.  Does have a history of heavy alcohol use but none in the past several years.  -8/7/2019 Hemoccult negative  -1/30/2020 reticulocyte count 1.4%.  B12 618.  Iron slightly low 48.  White count 2700 with hemoglobin 11.6 MCV 88 and normal red cell distribution width with platelets 124,000.  Normal thyroid functions.  -2/4/2020 white count 3100 hemoglobin 11.7 with normal MCV and red cell distribution with platelets 126,000 normal liver enzymes and bilirubin including fractionation.  Creatinine 1.36 GFR greater than 60 with normal calcium 9.1.  .  C-reactive protein 9.67 with sedimentation rate elevated at 40 as well.  Was started on iron with vitamin C.    -2/27/2020 initial [facility] hematology consultation: He had dark tarry stools predating the institution of his current iron and extensive prior endoscopic work-up as outlined above.  I will check his methylmalonic acid and homocystine along with repeat B12 and folate and with his elevated C-reactive protein and sedimentation rate will check a serum immunoelectrophoresis and light chains.  Given his prior drinking history despite the fact that his liver enzymes have been normal I strongly suspect portal hypertension and I suspect the GI bleeding may be related to this but we will get records from .  If we do not get answers from this then we will proceed with bone marrow biopsy.  If there is a monoclonal gammopathy we will also proceed with bone marrow biopsy.  With his putative history of rheumatoid arthritis he could have splenomegaly with Felty syndrome as well and I will check his ROSARIO and rheumatoid factor.    -2/27/20 data:  Hgb 11.4 o/w normal CBC  Periph. Smear mild hypochromic anemia with mature WBC's    ZACKARY neg  Haptogb 213 high  Retic 2 %    Nl bili direct and indirect  Urine hemosiderin  negative  Plasma free hemoglobin normal 7  G6PD 279 normal    ROSARIO neg  RF <10    Epo 22.4    Cr 1.6     nl  B12>2k  Homocysteine 10.5  Folate >20    Liver Spleen US:  Portal vein 1.7cm dilated  Smv Dilated 1.3 cm  But nl spleen size  Hepatomegaly with abnormal liver echogenicity.    Serum K 57.9, lambda 30.7, ratio 1.89.  Serum M spike zero    -5/26/2020 CT chest low-dose without contrast shows chronic granulomatous nodules lung RADS 2 with recommended annual follow-up.  Increased prominence of mucosal thickening distal esophagus compared to 2017 suggestive of esophagitis.    -7/17/2020 gastric angioma ectasias on EGD with Dr. Perez.  No esophageal abnormalities to corroborate with the above CT findings.    -7/27/2020 data:  White count 3600 with hemoglobin 11.6 platelets 137,000.  Creatinine 1.3 with GFR 65  Beta-2 microglobulin 3.2, upper limit 2.2  C-reactive protein 0.49/sedimentation rate 24  Serum immunoelectrophoresis showed no monoclonal spike  Serum free kappa light chains 52.8 with ratio of 2.25  Urine kappa light chains normal 41 with lambda normal 6 and ratio normal at 6  Bone survey essentially negative for myeloma save for a small minor posterior calvarial abnormality most likely venous lake.  Coincidental cardiomegaly with mild pulmonary vascular congestion.      -8/4/2020 data:   Sedimentation rate 37  White count 3100 hemoglobin 11.7 with platelets 115,000  Creatinine 1.27 with GFR greater than 60 and calcium 9.1 normal with normal total protein and albumin    -8/17/2020 hematology follow-up visit: Reviewed above data with patient.  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  Given persistence of monoclonal light chain with mild renal dysfunction even if the cranial abnormality  is just a venous lake, I would still complete work-up with bone marrow biopsy to ensure no plasma cell dyscrasia, myelodysplasia, aplasia.    -8/20/2020 bone marrow biopsy showed mild hypercellularity 33% with out atypia.  No increase in plasma cells.  No T-cell or B-cell clonality on flow.  Normal cytogenetics.  Hemoglobin 11.3 with platelets 123,000.  Hence low likelihood for plasma cell dyscrasia or myelodysplasia.    -9/3/2020 hematology follow-up visit: Reviewed results of bone marrow biopsy.  No hint of myelodysplasia or plasma cell dyscrasia or myelophthisic process.  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    -4/26/2021 [facility] hematology oncology follow-up visit: I reviewed his 3/22/2021 data: White count 2810 with hemoglobin 11 with platelets 134,000 and absolute neutrophil count 1200.  CMP unremarkable with normal liver enzymes and creatinine 1.15 with normal calcium 9.0 and normal total protein and alkaline phosphatase.  No serum M spike and scant elevation of kappa light chain 37.7 with normal lambda all improved over the last year.  Normal quantitative immunoglobulins.  24-hour urine immunoelectrophoresis had no monoclonality.  Sedimentation rate 21 with C-reactive protein 0.91 both minimally elevated.  Ionized calcium just barely above normal 1.37.  Beta-2 microglobulin 3.0 stable since July.  The bulk of his pancytopenia is sequestered if portal hypertension.  No significant kappa light chains in the serum and he has gastric angio ectasias.  We will follow with Dr. Perez and I will repeat his M panel again in 1 year and if that is stable would probably discontinue routine M  panel testing as I suspect this is just an inflammatory marker with rheumatoid arthritis.    -5/2/2022 [facility] Hematology clinic follow-up: Unfortunately Mr. Camejo did not get his labs prior to our appointment today.  We will check his M panel again today (serum free light chains, serum immunoelectrophoresis) along with CBC, CMP, sed rate and CRP.  Assuming he still has no abnormal protein and his CBC is stable then we will discontinue routine hematological follow-up as recommended at previous visit with Dr. Herrera on 4/26/2021.  I will call him later this week with his lab results from today.  Addendum: 5/13/2022 phone call: No m spike.  Labs stable fu prn.    - 2/24/2023 [facility] hematology follow-up: 8/3/2022 sedimentation rate 45 C-reactive protein 4.47.  White count 2400 hemoglobin 11.1 platelets 119,000 unremarkable BMP save for creatinine 1.41.  Normal total protein, albumin globulin and ratios.  Normal liver enzymes.  2/10/2023 White count 3500 hemoglobin 11 platelets 113,000 with unremarkable differential sedimentation rate 45.  Uric acid mildly elevated 7.2.  I have put in a referral back to Dr. 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  His blood counts overall have been stable for the better part of the last 9 months and he will see us on an as-needed basis.    -3/6/2023 [facility] inpatient hematology oncology consult: Since last I saw the patient and before he could get back to Dr. Perez for further management of his pancytopenia and gastric antral vascular ectasia, he was admitted with increasing shortness of breath and cough with increasing abdominal girth and pulmonary edema.  On evaluation of this, 3/5/2023 CT angiogram of chest and CT abdomen pelvis done In the emergency room in addition to moderate CHF and gallbladder distention showed enlarged bilateral inguinal nodes most likely reactive.  I was asked to  consult regarding this.  Ultimately need to know what these nodes are and whether the balanced elevation of his light chains could be related to underlying lymphoma which is a possibility albeit unlikely, ultimately we need tissue.  I have spoken with Dr. Coleman who understandably in the midst of his cardiac issues is hesitant to try to excavate an inguinal node surgically.  We will first try ultrasound-guided lymph node biopsy and see if that gives us any definitive answers.  My nurse practitioner will be covering for the next couple of days and I will check back Thursday.     -3/7/2023 [facility] hematology oncology inpatient follow-up:  Patient had biopsy of left inguinal lymph node this morning and tolerated it well.  We will follow up on results when available.  If results are not definitive then may consider excisional biopsy in the future when respiratory and heart failure symptoms have improved.  Cardiology following.  KUB suggest possible ileus.  Gallbladder US showed gallbladder within normal limits.  Continues to have abdominal distention with fullness.  Plan for HIDA scan today.       -3/8/2023 [facility] hematology oncology inpatient follow-up:   Left inguinal lymph node biopsy from yesterday pending.  We will follow up on results.  If not definite may consider excisional biopsy.  GI symptoms seem to be improved.  GI following.  Addendum: Left inguinal lymph node needle core biopsy showed fragments of benign lymph node with dense fibrovascular tissue negative for malignancy.  KUB showed ileus but HIDA scan showed no cholecystitis but ejection fraction of 19% with hypofunctioning gallbladder.  They discharged him this day on Xifaxan and recommendation to follow-up with Dr. Perez.  Hemoglobin on the day of discharge 3/8/2023 was 9.5 stable with normal white count and platelets minimally decreased 126,000 and stable.    -3/17/2023 [facility] hematology oncology follow-up outpatient: Inguinal biopsy site healing.   No pathologic abnormalities on core biopsy.  With multiple comorbidities, there is some intrinsic risk to excisional biopsy and for now rather than pushing the issue to get Dr. Coleman to remove this note, I will just repeat his CT chest abdomen pelvis in a couple of months and in the meantime he has a follow-up with Dr. 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  Overall his hemoglobin is stable in the nines-tens with stable platelet count in the 120,000's.    -5/18/2023 [facility] hematology oncology follow-up: I reviewed labs from today.  Hemoglobin is stable at 9.8 with normal white count and platelets.  Creatinine 1.52 and BUN 32 otherwise CMP PE unremarkable.  CT chest abdomen and pelvis was ordered to follow-up on inguinal lymphadenopathy from recent admission but for some reason has not been scheduled.  I will make sure those are scheduled and we will see him back in 1 month to review the results.  He continues to follow with Dr. 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  I reviewed his stress test results from yesterday that appeared normal.  He will continue to follow with Dr. Lyon.    .-6/20/2023 follow-up with Dr. Lyon cardiologist at [facility].  Mentions 5/22/2023 hemoglobin 9.9 with normal white count and platelet count 143,000 with creatinine 1.64, proBNP 2813, normal B12 and folate, ferritin normal 63.9 with iron 65.  Ejection fraction 60% with moderate pericardial effusion with recurrent admissions for near euvolemic CHF and renal artery stenosis less than 60% on the right.  Based on fluid restriction, blood pressure log, 3 g sodium, 1.5 L fluid restriction and compression stockings with follow-up in 3 months.    -6/26/2023 [facility] hematology oncology follow-up: With reference to his chronic pancytopenia likely related to GAVE/portal hypertension, he continues to follow-up with Dr. Perez later this month.  With  reference to his heart failure he continues to follow with Dr. Lyon who adjusted labs as outlined above and overall labs are doing fairly well.  With reference to his inguinal adenopathy seen on admission 3 months ago, he did not get his CAT scans as ordered 3 days ago.  We will order those without contrast now and have him follow-up with my nurse practitioner to make sure the nodes are not enlarging.  If so, we will get him to Dr. Coleman for excisional node biopsy.  His main complaint is of constipation for which he takes Linzess with Dr. Perez.  Counts overall doing reasonably well all things considered.    -7/1/2023 CT chest abdomen pelvis showed redemonstration of enlarged left inguinal lymph nodes which appear similar to minimally increased in size when compared to prior CT.  No suspicious or bulky adenopathy within chest abdomen or pelvis.  Showed circumferential wall thickening of the distal esophagus which is nonspecific and could be related to sequela of GERD.      -7/10/2023 [facility] hematology oncology follow-up: He continues to follow with Dr. Perez for his GAVE/portal hypertension.  He also is prescribed Linzess for chronic constipation.  He saw Dr. Perez last week.  He continues to follow with Dr. Lyon for his heart failure.  I reviewed CT results with patient that showed enlarged left inguinal lymph nodes that are similar to minimally increased in size when compared to prior CT.  I discussed with Dr. Herrera and we will repeat scans in 3 months.  If lymph nodes enlarged, we will get him to Dr. Coleman for excisional node biopsy.  I discussed wall thickening of the distal esophagus with patient and he reports having history of GERD.  He will notify Dr. Perez of results as well.  We will see him back in 3 months for follow-up with scans.  Instructed him to notify us if he feels lymph nodes are enlarging or become symptomatic and we will repeat scans sooner.    -8/22/2023 hemoglobin 11.1 white count 2800  platelets 131,000    -10/10/2023 CT chest abdomen pelvis without contrast compared to 7/1/2023 6 showed slightly larger small pericardial effusion with small pleural effusions and other stable changes.  No pathologic adenopathy in the chest.  Persistent relatively stable enlarged inguinal nodes (left 3.3 cm, right 3 cm).  Hepatic hypodensities similar to prior which are likely cysts.  Spleen unremarkable.    -10/12/2023 [facility] hematology follow-up: Counts in August stable save for decrease in white count.  Inguinal nodes stable 3 cm.  We will check CBC along with his impanel today and have him see my nurse practitioner back in a few weeks to go over results.  If all of these are stable then we will repeat his counts and CTs in 6 months and if the nodes grow have Dr. Coleman perform excisional biopsy but thus far the nodes have stayed stable and are unlikely to be malignant or certainly high-grade at any rate given stability over time.     Anemia   2/26/2020 Initial Diagnosis    Pancytopenia (CMS/HCC)         HISTORY OF PRESENT ILLNESS:  The patient is a 78 y.o. male, here for follow up on management of pancytopenia with GAVE/portal hypertension    Past Medical History:   Diagnosis Date    Arthritis     Asthma     Bowel trouble     CHF (congestive heart failure)     Chondrocalcinosis of right knee     Colitis     COPD (chronic obstructive pulmonary disease)     Diabetes mellitus     Esophagitis     Gout     H/O bladder problems     Heart murmur     Hypertension     IBS (irritable bowel syndrome)     JONAH on CPAP     Primary osteoarthritis of both knees     Rheumatoid arthritis     Skin problem     SOB (shortness of breath)      Past Surgical History:   Procedure Laterality Date    COLONOSCOPY      KNEE ARTHROSCOPY Left     PARTIAL KNEE ARTHROPLASTY Left     SKIN BIOPSY      STOMACH SURGERY      UPPER GASTROINTESTINAL ENDOSCOPY      US GUIDED LYMPH NODE BIOPSY  3/7/2023    VASECTOMY    "      Allergies   Allergen Reactions    Lisinopril Swelling    Benazepril Swelling       Family History and Social History reviewed and changed as necessary    REVIEW OF SYSTEM:   No new somatic complaints    PHYSICAL EXAM:  No jaundice icterus or pallor.  No abdominal tenderness.  Inguinal nodes not appreciable to my hand.    Vitals:    10/12/23 0952   BP: 150/60   Pulse: 69   Resp: 18   Temp: 97.4 øF (36.3 øC)   SpO2: 93%   Weight: 103 kg (227 lb)   Height: 172.7 cm (68\")     Vitals:    10/12/23 0952   PainSc: 0-No pain          ECOG score: 1           Vitals reviewed.        Lab Results   Component Value Date    HGB 9.8 (L) 05/18/2023    HCT 30.5 (L) 05/18/2023    MCV 95.0 05/18/2023     05/18/2023    WBC 3.93 05/18/2023    NEUTROABS 2.11 05/18/2023    LYMPHSABS 0.71 05/18/2023    MONOSABS 0.54 05/18/2023    EOSABS 0.49 (H) 05/18/2023    BASOSABS 0.06 05/18/2023       Lab Results   Component Value Date    GLUCOSE 99 05/18/2023    BUN 32 (H) 05/18/2023    CREATININE 1.52 (H) 05/18/2023     05/18/2023    K 5.1 05/18/2023     (H) 05/18/2023    CO2 25.0 05/18/2023    CALCIUM 9.0 05/18/2023    PROTEINTOT 6.8 05/18/2023    ALBUMIN 3.9 05/18/2023    BILITOT 0.4 05/18/2023    ALKPHOS 75 05/18/2023    AST 16 05/18/2023    ALT 21 05/18/2023             ASSESSMENT & PLAN:  1.  Pancytopenia with combination of anemia renal disease, possible sequestration with the hepatomegaly/portal vein dilation/SMV dilation on February 2020 liver spleen ultrasound suggesting portal hypertension and possible contribution from the gastric vascular ectasias on EGD  elevation of serum kappa and lambda light chains and gastric angioectasias on 7/17/2020 EGD.  Benign excisional node biopsy March 2023  2.  Chronic low back pain   3.  Chronic kidney disease  4.  Type 2 diabetes  5.  Hyperlipidemia  6.  Hypertension  7.  Hypogonadism  8.  Depression  9. COPD  10.  Obstructive sleep apnea  11.  Reflux   12.  Gout  13 " osteoarthritis  14.  BPH with ED  15.  BMI 36  16.  Putative history of rheumatoid arthritis  17.  Chronic granulomatous lung nodules on CT chest screening with routine follow-up recommended  18.  Scant elevation of serum light chains without intact monoclonal gammopathy and no plasma cell dyscrasia on bone marrow biopsy August 2020  19.  Inguinal adenopathy    Hematology history:  -Longstanding history of heme positive stools dating back to the late 80s with at least 5 colonoscopies by Dr. Perez including the last 1/2018 as well as EGDs and a capsule endoscopy that apparently showed scattered blood that they could not do much about.In the [facility] system we have hemoglobins of 9.8 and platelet of 121,000 with white count 4880 as of June 2016 and December 2015 white count 3020 with platelets 126,000 hemoglobin 11.4.  Does have a history of heavy alcohol use but none in the past several years.  -8/7/2019 Hemoccult negative  -1/30/2020 reticulocyte count 1.4%.  B12 618.  Iron slightly low 48.  White count 2700 with hemoglobin 11.6 MCV 88 and normal red cell distribution width with platelets 124,000.  Normal thyroid functions.  -2/4/2020 white count 3100 hemoglobin 11.7 with normal MCV and red cell distribution with platelets 126,000 normal liver enzymes and bilirubin including fractionation.  Creatinine 1.36 GFR greater than 60 with normal calcium 9.1.  .  C-reactive protein 9.67 with sedimentation rate elevated at 40 as well.  Was started on iron with vitamin C.    -2/27/2020 initial [facility] hematology consultation: He had dark tarry stools predating the institution of his current iron and extensive prior endoscopic work-up as outlined above.  I will check his methylmalonic acid and homocystine along with repeat B12 and folate and with his elevated C-reactive protein and sedimentation rate will check a serum immunoelectrophoresis and light chains.  Given his prior drinking history despite the fact that his liver  enzymes have been normal I strongly suspect portal hypertension and I suspect the GI bleeding may be related to this but we will get records from .  If we do not get answers from this then we will proceed with bone marrow biopsy.  If there is a monoclonal gammopathy we will also proceed with bone marrow biopsy.  With his putative history of rheumatoid arthritis he could have splenomegaly with Felty syndrome as well and I will check his ROSARIO and rheumatoid factor.    -2/27/20 data:  Hgb 11.4 o/w normal CBC  Periph. Smear mild hypochromic anemia with mature WBC's    ZACKARY neg  Haptogb 213 high  Retic 2 %    Nl bili direct and indirect  Urine hemosiderin negative  Plasma free hemoglobin normal 7  G6PD 279 normal    ROSARIO neg  RF <10    Epo 22.4    Cr 1.6     nl  B12>2k  Homocysteine 10.5  Folate >20    Liver Spleen US:  Portal vein 1.7cm dilated  Smv Dilated 1.3 cm  But nl spleen size  Hepatomegaly with abnormal liver echogenicity.    Serum K 57.9, lambda 30.7, ratio 1.89.  Serum M spike zero    -5/26/2020 CT chest low-dose without contrast shows chronic granulomatous nodules lung RADS 2 with recommended annual follow-up.  Increased prominence of mucosal thickening distal esophagus compared to 2017 suggestive of esophagitis.    -7/17/2020 gastric angioma ectasias on EGD with Dr. Perez.  No esophageal abnormalities to corroborate with the above CT findings.    -7/27/2020 data:  White count 3600 with hemoglobin 11.6 platelets 137,000.  Creatinine 1.3 with GFR 65  Beta-2 microglobulin 3.2, upper limit 2.2  C-reactive protein 0.49/sedimentation rate 24  Serum immunoelectrophoresis showed no monoclonal spike  Serum free kappa light chains 52.8 with ratio of 2.25  Urine kappa light chains normal 41 with lambda normal 6 and ratio normal at 6  Bone survey essentially negative for myeloma save for a small minor posterior calvarial abnormality most likely venous lake.  Coincidental cardiomegaly with mild  pulmonary vascular congestion.      -8/4/2020 data:   Sedimentation rate 37  White count 3100 hemoglobin 11.7 with platelets 115,000  Creatinine 1.27 with GFR greater than 60 and calcium 9.1 normal with normal total protein and albumin    -8/17/2020 hematology follow-up visit: Reviewed above data with patient.  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  Given persistence of monoclonal light chain with mild renal dysfunction even if the cranial abnormality is just a venous lake, I would still complete work-up with bone marrow biopsy to ensure no plasma cell dyscrasia, myelodysplasia, aplasia.    -8/20/2020 bone marrow biopsy showed mild hypercellularity 33% with out atypia.  No increase in plasma cells.  No T-cell or B-cell clonality on flow.  Normal cytogenetics.  Hemoglobin 11.3 with platelets 123,000.  Hence low likelihood for plasma cell dyscrasia or myelodysplasia.    -9/3/2020 hematology follow-up visit: Reviewed results of bone marrow biopsy.  No hint of myelodysplasia or plasma cell dyscrasia or myelophthisic process.  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    -4/26/2021 [facility] hematology oncology follow-up visit: I reviewed his 3/22/2021 data: White count 2810 with hemoglobin 11 with platelets 134,000 and absolute neutrophil count 1200.  CMP unremarkable with normal liver enzymes and creatinine 1.15 with normal calcium 9.0 and normal total protein and alkaline  phosphatase.  No serum M spike and scant elevation of kappa light chain 37.7 with normal lambda all improved over the last year.  Normal quantitative immunoglobulins.  24-hour urine immunoelectrophoresis had no monoclonality.  Sedimentation rate 21 with C-reactive protein 0.91 both minimally elevated.  Ionized calcium just barely above normal 1.37.  Beta-2 microglobulin 3.0 stable since July.  The bulk of his pancytopenia is sequestered if portal hypertension.  No significant kappa light chains in the serum and he has gastric angio ectasias.  We will follow with Dr. Perez and I will repeat his M panel again in 1 year and if that is stable would probably discontinue routine M panel testing as I suspect this is just an inflammatory marker with rheumatoid arthritis.    -5/2/2022 [facility] follow-up: Unfortunately Mr. Camejo did not get his labs prior to our appointment today.  We will check his M panel again today (serum free light chains, serum immunoelectrophoresis) along with CBC, CMP, sed rate and CRP.  Assuming he still has no abnormal protein and his CBC is stable then we will discontinue routine hematological follow-up as recommended at previous visit with Dr. Herrera on 4/26/2021.  I will call him later this week with his lab results from today.  Addendum: 5/13/2022 phone call: No m spike.  Labs stable fu prn.    - 2/24/2023 [facility] follow-up: 8/3/2022 sedimentation rate 45 C-reactive protein 4.47.  White count 2400 hemoglobin 11.1 platelets 119,000 unremarkable BMP save for creatinine 1.41.  Normal total protein, albumin globulin and ratios.  Normal liver enzymes.  2/10/2023 White count 3500 hemoglobin 11 platelets 113,000 with unremarkable differential sedimentation rate 45.  Uric acid mildly elevated 7.2.  I have put in a referral back to Dr. 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  His  blood counts overall have been stable for the better part of the last 9 months and he will see us on an as-needed basis.    -3/6/2023 [facility], [place], operated by [facility] inpatient hematology oncology consult: Since last I saw the patient and before he could get back to Dr. Perez for further management of his pancytopenia and gastric antral vascular ectasia, he was admitted with increasing shortness of breath and cough with increasing abdominal girth and pulmonary edema.  On evaluation of this, 3/5/2023 CT angiogram of chest and CT abdomen pelvis done In the emergency room in addition to moderate CHF and gallbladder distention showed enlarged bilateral inguinal nodes most likely reactive.  I was asked to consult regarding this.  Ultimately need to know what these nodes are and whether the balanced elevation of his light chains could be related to underlying lymphoma which is a possibility albeit unlikely, ultimately we need tissue.  I have spoken with Dr. Coleman who understandably in the midst of his cardiac issues is hesitant to try to excavate an inguinal node surgically.  We will first try ultrasound-guided lymph node biopsy and see if that gives us any definitive answers.  My nurse practitioner will be covering for the next couple of days and I will check back Thursday.     -3/7/2023 [facility], [place], operated by [facility] hematology oncology inpatient follow-up:  Patient had biopsy of left inguinal lymph node this morning and tolerated it well.  We will follow up on results when available.  If results are not definitive then may consider excisional biopsy in the future when respiratory and heart failure symptoms have improved.  Cardiology following.  KUB suggest possible ileus.  Gallbladder US showed gallbladder within normal limits.  Continues to have abdominal distention with fullness.  Plan for HIDA scan today.       -3/8/2023 [facility], [place], operated by [facility] hematology oncology inpatient follow-up:   Left inguinal lymph node biopsy from yesterday pending.  We will follow up on results.  If  not definite may consider excisional biopsy.  GI symptoms seem to be improved.  GI following.  Addendum: Left inguinal lymph node needle core biopsy showed fragments of benign lymph node with dense fibrovascular tissue negative for malignancy.  KUB showed ileus but HIDA scan showed no cholecystitis but ejection fraction of 19% with hypofunctioning gallbladder.  They discharged him this day on Xifaxan and recommendation to follow-up with Dr. Perez.  Hemoglobin on the day of discharge 3/8/2023 was 9.5 stable with normal white count and platelets minimally decreased 126,000 and stable.    -3/17/2023 [facility] hematology oncology follow-up outpatient: Inguinal biopsy site healing.  No pathologic abnormalities on core biopsy.  With multiple comorbidities, there is some intrinsic risk to excisional biopsy and for now rather than pushing the issue to get Dr. Coleman to remove this note, I will just repeat his CT chest abdomen pelvis in a couple of months and in the meantime he has a follow-up with Dr. 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  Overall his hemoglobin is stable in the nines-tens with stable platelet count in the 120,000's.    -5/18/2023 [facility] hematology oncology follow-up: I reviewed labs from today.  Hemoglobin is stable at 9.8 with normal white count and platelets.  Creatinine 1.52 and BUN 32 otherwise CMP PE unremarkable.  CT chest abdomen and pelvis was ordered to follow-up on inguinal lymphadenopathy from recent admission but for some reason has not been scheduled.  I will make sure those are scheduled and we will see him back in 1 month to review the results.  He continues to follow with Dr. 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  I reviewed his stress test results from yesterday that appeared normal.  He will continue to follow with Dr. Lyon.    .-6/20/2023 follow-up with Dr. Lyon cardiologist at [facility].   Mentions 5/22/2023 hemoglobin 9.9 with normal white count and platelet count 143,000 with creatinine 1.64, proBNP 2813, normal B12 and folate, ferritin normal 63.9 with iron 65.  Ejection fraction 60% with moderate pericardial effusion with recurrent admissions for near euvolemic CHF and renal artery stenosis less than 60% on the right.  Based on fluid restriction, blood pressure log, 3 g sodium, 1.5 L fluid restriction and compression stockings with follow-up in 3 months.    -6/26/2023 [facility] hematology oncology follow-up: With reference to his chronic pancytopenia likely related to GAVE/portal hypertension, he continues to follow-up with Dr. Perez later this month.  With reference to his heart failure he continues to follow with Dr. Lyon who adjusted labs as outlined above and overall labs are doing fairly well.  With reference to his inguinal adenopathy seen on admission 3 months ago, he did not get his CAT scans as ordered 3 days ago.  We will order those without contrast now and have him follow-up with my nurse practitioner to make sure the nodes are not enlarging.  If so, we will get him to Dr. Coleman for excisional node biopsy.  His main complaint is of constipation for which he takes Linzess with Dr. Perez.  Counts overall doing reasonably well all things considered.    -7/1/2023 CT chest abdomen pelvis showed redemonstration of enlarged left inguinal lymph nodes which appear similar to minimally increased in size when compared to prior CT.  No suspicious or bulky adenopathy within chest abdomen or pelvis.  Showed circumferential wall thickening of the distal esophagus which is nonspecific and could be related to sequela of GERD.      -7/10/2023 [facility] hematology oncology follow-up: He continues to follow with Dr. Perez for his GAVE/portal hypertension.  He also is prescribed Linzess for chronic constipation.  He saw Dr. Perez last week.  He continues to follow with Dr. Lyon for his heart failure.  I  reviewed CT results with patient that showed enlarged left inguinal lymph nodes that are similar to minimally increased in size when compared to prior CT.  I discussed with Dr. Herrera and we will repeat scans in 3 months.  If lymph nodes enlarged, we will get him to Dr. Coleman for excisional node biopsy.  I discussed wall thickening of the distal esophagus with patient and he reports having history of GERD.  He will notify Dr. Perez of results as well.  We will see him back in 3 months for follow-up with scans.  Instructed him to notify us if he feels lymph nodes are enlarging or become symptomatic and we will repeat scans sooner.    -8/22/2023 hemoglobin 11.1 white count 2800 platelets 131,000    -10/10/2023 CT chest abdomen pelvis without contrast compared to 7/1/2023 6 showed slightly larger small pericardial effusion with small pleural effusions and other stable changes.  No pathologic adenopathy in the chest.  Persistent relatively stable enlarged inguinal nodes (left 3.3 cm, right 3 cm).  Hepatic hypodensities similar to prior which are likely cysts.  Spleen unremarkable.    -10/12/2023 [facility] follow-up: Counts in August stable save for decrease in white count.  Inguinal nodes stable 3 cm.  We will check CBC along with his impanel today and have him see my nurse practitioner back in a few weeks to go over results.  If all of these are stable then we will repeat his counts and CTs in 6 months and if the nodes grow have Dr. Coleman perform excisional biopsy but thus far the nodes have stayed stable and are unlikely to be malignant or certainly high-grade at any rate given stability over time.    Total time of care today inclusive of time spent today prior to patient's arrival reviewing images and reports of images from a couple of days ago and interval data and during visit putting forth a plan as outlined above and interviewing him as to signs or symptoms relative to the causes and consequences of his  pancytopenia and after visit instituting this plan took 30 minutes of patient care time throughout the day today.  Yuval Herrera MD    10/12/2023

## 2023-10-13 LAB
ALBUMIN SERPL ELPH-MCNC: 3.5 G/DL (ref 2.9–4.4)
ALBUMIN/GLOB SERPL: 1.3 {RATIO} (ref 0.7–1.7)
ALPHA1 GLOB SERPL ELPH-MCNC: 0.2 G/DL (ref 0–0.4)
ALPHA2 GLOB SERPL ELPH-MCNC: 0.8 G/DL (ref 0.4–1)
B-GLOBULIN SERPL ELPH-MCNC: 0.8 G/DL (ref 0.7–1.3)
GAMMA GLOB SERPL ELPH-MCNC: 1.2 G/DL (ref 0.4–1.8)
GLOBULIN SER-MCNC: 2.9 G/DL (ref 2.2–3.9)
IGA SERPL-MCNC: 136 MG/DL (ref 61–437)
IGG SERPL-MCNC: 1324 MG/DL (ref 603–1613)
IGM SERPL-MCNC: 26 MG/DL (ref 15–143)
INTERPRETATION SERPL IEP-IMP: NORMAL
KAPPA LC FREE SER-MCNC: 60.3 MG/L (ref 3.3–19.4)
KAPPA LC FREE/LAMBDA FREE SER: 2 {RATIO} (ref 0.26–1.65)
LABORATORY COMMENT REPORT: NORMAL
LAMBDA LC FREE SERPL-MCNC: 30.1 MG/L (ref 5.7–26.3)
M PROTEIN SERPL ELPH-MCNC: NORMAL G/DL
PROT SERPL-MCNC: 6.4 G/DL (ref 6–8.5)

## 2023-10-16 LAB — IGE SERPL-ACNC: 23 IU/ML (ref 6–495)

## 2023-10-30 ENCOUNTER — TELEPHONE (OUTPATIENT)
Dept: ONCOLOGY | Facility: CLINIC | Age: 78
End: 2023-10-30

## 2023-10-30 NOTE — TELEPHONE ENCOUNTER
Caller: Miguel Camejo    Relationship to patient: Self    Best call back number: [phone]    Type of visit: F/U APPT    Requested date: ANYTIME    If rescheduling, when is the original appointment: 10/30/23    TRIED TO W/T TO OFFICE BUT WAS UNSUCCESSFUL

## 2023-11-03 ENCOUNTER — TELEPHONE (OUTPATIENT)
Dept: ONCOLOGY | Facility: CLINIC | Age: 78
End: 2023-11-03

## 2023-11-03 DIAGNOSIS — D69.6 THROMBOCYTOPENIA: ICD-10-CM

## 2023-11-03 DIAGNOSIS — D64.9 ANEMIA, UNSPECIFIED TYPE: Primary | ICD-10-CM

## 2023-11-03 NOTE — TELEPHONE ENCOUNTER
Caller: Miguel Camejo    Relationship: Self    Best call back number: [phone]     What is the best time to reach you: 9-5 (PLEASE LEAVE A VM BECAUSE THE PT DOESN'T NORMALLY ANSWER THE PHONE DUE TO SCAMMERS).    Who are you requesting to speak with (clinical staff, provider,  specific staff member): CLINICAL    What was the call regarding: PT SPOKE WITH TOS[facility], PT STATES THAT [employer] TOLD HIM THAT THEY DO NOT COVER OON. PT IS NEEDING TO CANCEL HIS UPCOMING APPT ON 11/07/23 AND WILL NEED HAVE A REFERRAL TO ANOTHER HEMATOLOGIST/ONCOLOGIST THAT IS IN HIS NETWORK.     Is it okay if the provider responds through [employer]: NO - PLEASE CALL THE PT BACK TO ADVISE.

## 2023-11-06 NOTE — TELEPHONE ENCOUNTER
Discussed with Dr. Herrera, patients follow up for tomorrow has already been cancelled. Please send referral to [facility] and contact patient with appt, he states to leave a VM with information.

## 2025-04-29 ENCOUNTER — TRANSCRIBE ORDERS (OUTPATIENT)
Dept: GENERAL RADIOLOGY | Facility: HOSPITAL | Age: 80
End: 2025-04-29
Payer: MEDICARE

## 2025-04-29 ENCOUNTER — HOSPITAL ENCOUNTER (OUTPATIENT)
Dept: GENERAL RADIOLOGY | Facility: HOSPITAL | Age: 80
Discharge: HOME | End: 2025-04-29
Admitting: FAMILY MEDICINE
Payer: MEDICARE

## 2025-04-29 DIAGNOSIS — M79.641 RIGHT HAND PAIN: Primary | ICD-10-CM

## 2025-04-29 DIAGNOSIS — M79.641 RIGHT HAND PAIN: ICD-10-CM

## 2025-04-29 PROCEDURE — 73130 X-RAY EXAM OF HAND: CPT

## 2025-08-04 ENCOUNTER — TRANSCRIBE ORDERS (OUTPATIENT)
Dept: GENERAL RADIOLOGY | Facility: HOSPITAL | Age: 80
End: 2025-08-04
Payer: MEDICARE

## 2025-08-04 ENCOUNTER — HOSPITAL ENCOUNTER (OUTPATIENT)
Dept: GENERAL RADIOLOGY | Facility: HOSPITAL | Age: 80
Discharge: HOME | End: 2025-08-04
Admitting: FAMILY MEDICINE
Payer: MEDICARE

## 2025-08-04 DIAGNOSIS — M62.838 CERVICAL PARASPINAL MUSCLE SPASM: Primary | ICD-10-CM

## 2025-08-04 DIAGNOSIS — M62.838 CERVICAL PARASPINAL MUSCLE SPASM: ICD-10-CM

## 2025-08-04 PROCEDURE — 72052 X-RAY EXAM NECK SPINE 6/>VWS: CPT
